# Patient Record
Sex: FEMALE | Race: WHITE | NOT HISPANIC OR LATINO | Employment: FULL TIME | URBAN - METROPOLITAN AREA
[De-identification: names, ages, dates, MRNs, and addresses within clinical notes are randomized per-mention and may not be internally consistent; named-entity substitution may affect disease eponyms.]

---

## 2017-03-06 RX ORDER — BISOPROLOL FUMARATE 10 MG/1
TABLET, FILM COATED ORAL
Qty: 90 TABLET | Refills: 3 | Status: SHIPPED | OUTPATIENT
Start: 2017-03-06 | End: 2017-03-14 | Stop reason: SDUPTHER

## 2017-03-09 ENCOUNTER — LAB (OUTPATIENT)
Dept: LAB | Facility: HOSPITAL | Age: 32
End: 2017-03-09

## 2017-03-09 DIAGNOSIS — E83.110 HEREDITARY HEMOCHROMATOSIS (HCC): ICD-10-CM

## 2017-03-09 LAB
ALBUMIN SERPL-MCNC: 4.2 G/DL (ref 3.2–4.8)
ALBUMIN/GLOB SERPL: 1.5 G/DL (ref 1.5–2.5)
ALP SERPL-CCNC: 64 U/L (ref 25–100)
ALT SERPL W P-5'-P-CCNC: 40 U/L (ref 7–40)
ANION GAP SERPL CALCULATED.3IONS-SCNC: 8 MMOL/L (ref 3–11)
AST SERPL-CCNC: 29 U/L (ref 0–33)
BASOPHILS # BLD AUTO: 0.03 10*3/MM3 (ref 0–0.2)
BASOPHILS NFR BLD AUTO: 0.4 % (ref 0–1)
BILIRUB SERPL-MCNC: 0.3 MG/DL (ref 0.3–1.2)
BUN BLD-MCNC: 14 MG/DL (ref 9–23)
BUN/CREAT SERPL: 20 (ref 7–25)
CALCIUM SPEC-SCNC: 9.4 MG/DL (ref 8.7–10.4)
CHLORIDE SERPL-SCNC: 105 MMOL/L (ref 99–109)
CO2 SERPL-SCNC: 23 MMOL/L (ref 20–31)
CREAT BLD-MCNC: 0.7 MG/DL (ref 0.6–1.3)
DEPRECATED RDW RBC AUTO: 42.3 FL (ref 37–54)
EOSINOPHIL # BLD AUTO: 0.23 10*3/MM3 (ref 0.1–0.3)
EOSINOPHIL NFR BLD AUTO: 3.2 % (ref 0–3)
ERYTHROCYTE [DISTWIDTH] IN BLOOD BY AUTOMATED COUNT: 12.4 % (ref 11.3–14.5)
FERRITIN SERPL-MCNC: 102 NG/ML (ref 10–291)
GFR SERPL CREATININE-BSD FRML MDRD: 97 ML/MIN/1.73
GLOBULIN UR ELPH-MCNC: 2.8 GM/DL
GLUCOSE BLD-MCNC: 94 MG/DL (ref 70–100)
HCT VFR BLD AUTO: 43.3 % (ref 34.5–44)
HGB BLD-MCNC: 14.7 G/DL (ref 11.5–15.5)
IMM GRANULOCYTES # BLD: 0.02 10*3/MM3 (ref 0–0.03)
IMM GRANULOCYTES NFR BLD: 0.3 % (ref 0–0.6)
IRON 24H UR-MRATE: 112 MCG/DL (ref 50–175)
IRON SATN MFR SERPL: 34 % (ref 15–50)
LYMPHOCYTES # BLD AUTO: 3.59 10*3/MM3 (ref 0.6–4.8)
LYMPHOCYTES NFR BLD AUTO: 50.6 % (ref 24–44)
MCH RBC QN AUTO: 31.2 PG (ref 27–31)
MCHC RBC AUTO-ENTMCNC: 33.9 G/DL (ref 32–36)
MCV RBC AUTO: 91.9 FL (ref 80–99)
MONOCYTES # BLD AUTO: 0.5 10*3/MM3 (ref 0–1)
MONOCYTES NFR BLD AUTO: 7.1 % (ref 0–12)
NEUTROPHILS # BLD AUTO: 2.72 10*3/MM3 (ref 1.5–8.3)
NEUTROPHILS NFR BLD AUTO: 38.4 % (ref 41–71)
PLATELET # BLD AUTO: 201 10*3/MM3 (ref 150–450)
PMV BLD AUTO: 10.4 FL (ref 6–12)
POTASSIUM BLD-SCNC: 4 MMOL/L (ref 3.5–5.5)
PROT SERPL-MCNC: 7 G/DL (ref 5.7–8.2)
RBC # BLD AUTO: 4.71 10*6/MM3 (ref 3.89–5.14)
SODIUM BLD-SCNC: 136 MMOL/L (ref 132–146)
TIBC SERPL-MCNC: 328 MCG/DL (ref 250–450)
WBC NRBC COR # BLD: 7.09 10*3/MM3 (ref 3.5–10.8)

## 2017-03-09 PROCEDURE — 83550 IRON BINDING TEST: CPT | Performed by: INTERNAL MEDICINE

## 2017-03-09 PROCEDURE — 82728 ASSAY OF FERRITIN: CPT | Performed by: INTERNAL MEDICINE

## 2017-03-09 PROCEDURE — 85025 COMPLETE CBC W/AUTO DIFF WBC: CPT | Performed by: INTERNAL MEDICINE

## 2017-03-09 PROCEDURE — 36415 COLL VENOUS BLD VENIPUNCTURE: CPT

## 2017-03-09 PROCEDURE — 83540 ASSAY OF IRON: CPT | Performed by: INTERNAL MEDICINE

## 2017-03-09 PROCEDURE — 80053 COMPREHEN METABOLIC PANEL: CPT | Performed by: INTERNAL MEDICINE

## 2017-03-14 ENCOUNTER — OFFICE VISIT (OUTPATIENT)
Dept: CARDIOLOGY | Facility: CLINIC | Age: 32
End: 2017-03-14

## 2017-03-14 VITALS
HEART RATE: 89 BPM | WEIGHT: 221.2 LBS | DIASTOLIC BLOOD PRESSURE: 80 MMHG | SYSTOLIC BLOOD PRESSURE: 102 MMHG | BODY MASS INDEX: 40.7 KG/M2 | HEIGHT: 62 IN

## 2017-03-14 DIAGNOSIS — R00.0 INAPPROPRIATE SINUS TACHYCARDIA: Primary | ICD-10-CM

## 2017-03-14 DIAGNOSIS — R55 VASOVAGAL SYNCOPE: ICD-10-CM

## 2017-03-14 PROCEDURE — 99213 OFFICE O/P EST LOW 20 MIN: CPT | Performed by: INTERNAL MEDICINE

## 2017-03-14 RX ORDER — BUSPIRONE HYDROCHLORIDE 15 MG/1
15 TABLET ORAL 2 TIMES DAILY
COMMUNITY
End: 2018-06-21

## 2017-03-14 RX ORDER — DESVENLAFAXINE 100 MG/1
100 TABLET, EXTENDED RELEASE ORAL DAILY
COMMUNITY
End: 2019-05-21 | Stop reason: SDUPTHER

## 2017-03-14 RX ORDER — BISOPROLOL FUMARATE 10 MG/1
10 TABLET, FILM COATED ORAL DAILY
Qty: 90 TABLET | Refills: 3 | Status: SHIPPED | OUTPATIENT
Start: 2017-03-14 | End: 2018-11-30

## 2017-03-14 NOTE — PROGRESS NOTES
Cristina Martínez  1985  [phone]      03/14/2017    [facility] CARDIOLOGY     Rahat Mercedes MD  [address] [place] [address]    Chief Complaint   Patient presents with   • Palpitations       Problem List:   PROBLEM LIST:   1. Vasovagal syncope:   a. Echocardiogram, 04/12/2007, showing trace MR, TR, EF 60%.  b. Echocardiogram, March 2008: Normal LV size and function.   c. Echocardiogram, November 2013: EF 60% to 65%, trace MR.   2. Inappropriate sinus tachycardia:   a. Electrophysiology study with radiofrequency ablation, June 2011 at [facility] in [place], [place], no data.   b. Echocardiogram February 2016: EF 60% to 65%, impaired relaxation noted. Trace MR, trace TR.  3. Hypertension.   4. Thirteen weeks pregnant on 08/08/2012.  5. Sinus tachycardia.   6. Migraine headaches.   7. Probable fibromyalgia.   8. Possible hemochromatosis, status post liver biopsy.   9. History of positive Monospot.   10. Depression.   11. Status post hysterectomy, August 2013.    Allergies  No Known Allergies    Current Medications    Current Outpatient Prescriptions:   •  bisoprolol (ZEBeta) 10 MG tablet, TAKE 1 TABLET ONCE DAILY, Disp: 90 tablet, Rfl: 3  •  busPIRone (BUSPAR) 15 MG tablet, Take 15 mg by mouth 2 (Two) Times a Day., Disp: , Rfl:   •  desvenlafaxine (PRISTIQ) 100 MG 24 hr tablet, Take 100 mg by mouth Daily., Disp: , Rfl:     History of Present Illness   HPI    Pt presents for follow up of sinus tachycardia and vasovagal syncope. Since we last saw the pt she had one true syncopal episode in the setting of intense emotional stress.  She says she was out for a few minutes and EMS was called. It was reported that they had difficulty having finding a pulse and her BP was very low.  She has had no further episodes.  In the evenings her BP is running 130/100.  In the morning her BP is lower 100/80. pt denies any AF episodes, SOB, CP, LH, and  "dizziness. Denies any hospitalizations, ER visits, bleeding, or TIA/CVA symptoms. Overall feels well.    ROS:  General:  + ongoing fatigue, No weight gain or loss  Cardiovascular:  Denies CP, PND, edema or  Occasional palpitations.  Pulmonary:  Denies HEART, cough, or wheezing      Vitals:    03/14/17 1337   BP: 102/80   BP Location: Right arm   Patient Position: Sitting   Pulse: 89   Weight: 221 lb 3.2 oz (100 kg)   Height: 62\" (157.5 cm)     PE:  General: NAD  Neck: no JVD, no carotid bruits, no TM  Heart RRR, NL S1, S2, S4 present, no rubs, murmurs  Lungs: CTA, no wheezes, rhonchi, or rales  Abd: soft, non-tender, NL BS  Ext: No musculoskeletal deformities, no edema, cyanosis, or clubbing  Psych: normal mood and affect    Diagnostic Data:  Procedures    1. Inappropriate sinus tachycardia    2. Vasovagal syncope      Plan:  1) Sinus tachycardia -  Stable on her current dose of meds  2) Vasovagal syncope- 1 episode in the setting of intense emotional stress   3) HTN- BP well controlled.     F/up in 12 months    Scribed for Dashawn Gentile MD by VERITO Ordoñez. 3/14/2017  2:22 PM     I, Dashawn Gentile MD, personally performed the services face to face as described in this documentation and as scribed by the above named individual in my presence, and it is both accurate and complete.  3/14/2017  2:22 PM        "

## 2017-03-16 ENCOUNTER — OFFICE VISIT (OUTPATIENT)
Dept: ONCOLOGY | Facility: CLINIC | Age: 32
End: 2017-03-16

## 2017-03-16 VITALS
RESPIRATION RATE: 16 BRPM | WEIGHT: 220 LBS | DIASTOLIC BLOOD PRESSURE: 77 MMHG | BODY MASS INDEX: 40.24 KG/M2 | SYSTOLIC BLOOD PRESSURE: 105 MMHG | HEART RATE: 75 BPM | TEMPERATURE: 97.3 F

## 2017-03-16 DIAGNOSIS — E83.110 HEREDITARY HEMOCHROMATOSIS (HCC): Primary | ICD-10-CM

## 2017-03-16 PROCEDURE — 99213 OFFICE O/P EST LOW 20 MIN: CPT | Performed by: NURSE PRACTITIONER

## 2017-03-16 NOTE — PROGRESS NOTES
CHIEF COMPLAINT: Follow-up for hemochromatosis    Problem List:  Oncology/Hematology History    1. 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  No other significant upper abdominal pathology. Liver otherwise appears unremarkable.   2. History of hysterectomy.   3. History of neurocardiogenic syncope status post cardiac ablation by Dr. Gentile.   4.  Passage of kidney stone with retrieval July 2016        Hemochromatosis    5/1/2007 Initial Diagnosis    Hemochromatosis         HISTORY OF PRESENT ILLNESS:  The patient is a 31 y.o. female, here for follow up on management of hereditary hemachromatosis.  The patient has been doing well since we saw her last with no change in her health.  She has not donated blood or had any phlebotomies.  Continues to struggle with weight issues, has not lost any weight.  Overall she states that she actually feels well.  She does use essential oils and feels that this has made an improvement in her well-being.      Past Medical History   Diagnosis Date   • Cardiac abnormality    • Depression 12/16/2016   • Fibromyalgia 12/16/2016     probable   • Hemochromatosis    • Hypertension 12/16/2016   • Inappropriate sinus tachycardia 12/16/2016     Electrophysiology study with radiofrequency ablation, June 2011 at [facility] in [place], [place], no data.   Echocardiogram February 2016: EF 60% to 65%, impaired relaxation noted. Trace MR, trace TR.    • Kidney stone    • Migraines    • Monospot test positive      History of positive Monospot.     • Neurocardiogenic syncope      with tachycardia. Pt reports she had cardiac ablation in 2011.     Past Surgical History   Procedure Laterality Date   • Hysterectomy     • Cystoscopy ureteroscopy Left 7/10/2016     Procedure: CYSTOSCOPY, LEFT URETEROSCOPY, STONE EXTRACTION,  LEFT URETERAL STENT INSERTION UNDER FLUROSCOPY;  Surgeon: Bernardo Simental MD;  Location: [facility];  Service:        No Known Allergies    Family History and Social History reviewed and changed as necessary      REVIEW OF SYSTEM:   Review of Systems   Constitutional: Negative for appetite change, chills, diaphoresis, fatigue, fever and unexpected weight change.   HENT:   Negative for mouth sores, sore throat and trouble swallowing.    Eyes: Negative for icterus.   Respiratory: Negative for cough, hemoptysis and shortness of breath.    Cardiovascular: Negative for chest pain, leg swelling and palpitations.   Gastrointestinal: Negative for abdominal distention, abdominal pain, blood in stool, constipation, diarrhea, nausea and vomiting.   Endocrine: Negative for hot flashes.   Genitourinary: Negative for bladder incontinence, difficulty urinating, dysuria, frequency and hematuria.    Musculoskeletal: Negative for gait problem, neck pain and neck stiffness.   Skin: Negative for rash.   Neurological: Negative for dizziness, gait problem, headaches, light-headedness and numbness.   Hematological: Negative for adenopathy. Does not bruise/bleed easily.   Psychiatric/Behavioral: Negative for depression. The patient is not nervous/anxious.    All other systems reviewed and are negative.       PHYSICAL EXAM    Vitals:    03/16/17 0916   BP: 105/77   Pulse: 75   Resp: 16   Temp: 97.3 °F (36.3 °C)   TempSrc: Temporal Artery    Weight: 220 lb (99.8 kg)     Constitutional: Appears well-developed and well-nourished. No distress.   ECOG: (0) Fully active, able to carry on all predisease performance without restriction  HENT:   Head: Normocephalic.   Mouth/Throat: Oropharynx is clear and moist.   Eyes: Conjunctivae are normal. Pupils are equal, round, and reactive. No scleral icterus.   Neck: Neck supple. No JVD present. No thyromegaly present.   Cardiovascular: Normal rate, regular rhythm and normal heart sounds.     Pulmonary/Chest: Breath sounds normal. No respiratory distress.   Abdominal: Soft. Exhibits no distension and no mass. There is no hepatosplenomegaly.   Musculoskeletal:Exhibits no edema, tenderness or deformity.   Neurological: Alert and oriented to person, place, and time. Exhibits normal muscle tone.   Skin: No ecchymosis, no petechiae and no rash noted. Not diaphoretic. No cyanosis. Nails show no clubbing.   Psychiatric: Normal mood and affect.   Vitals reviewed.    Recent Results (from the past 336 hour(s))   Ferritin    Collection Time: 03/09/17  9:59 AM   Result Value Ref Range    Ferritin 102.00 10.00 - 291.00 ng/mL   Comprehensive metabolic panel    Collection Time: 03/09/17  9:59 AM   Result Value Ref Range    Glucose 94 70 - 100 mg/dL    BUN 14 9 - 23 mg/dL    Creatinine 0.70 0.60 - 1.30 mg/dL    Sodium 136 132 - 146 mmol/L    Potassium 4.0 3.5 - 5.5 mmol/L    Chloride 105 99 - 109 mmol/L    CO2 23.0 20.0 - 31.0 mmol/L    Calcium 9.4 8.7 - 10.4 mg/dL    Total Protein 7.0 5.7 - 8.2 g/dL    Albumin 4.20 3.20 - 4.80 g/dL    ALT (SGPT) 40 7 - 40 U/L    AST (SGOT) 29 0 - 33 U/L    Alkaline Phosphatase 64 25 - 100 U/L    Total Bilirubin 0.3 0.3 - 1.2 mg/dL    eGFR Non African Amer 97 >60 mL/min/1.73    Globulin 2.8 gm/dL    A/G Ratio 1.5 1.5 - 2.5 g/dL    BUN/Creatinine Ratio 20.0 7.0 - 25.0    Anion Gap 8.0 3.0 - 11.0 mmol/L   Iron profile    Collection Time: 03/09/17  9:59 AM   Result Value Ref Range    Iron 112 50 - 175 mcg/dL    TIBC 328 250 - 450 mcg/dL    Iron Saturation 34 15 - 50 %   CBC Auto Differential    Collection Time: 03/09/17  9:59 AM   Result Value Ref Range    WBC 7.09 3.50 - 10.80 10*3/mm3    RBC 4.71 3.89 - 5.14 10*6/mm3    Hemoglobin 14.7 11.5 - 15.5 g/dL    Hematocrit 43.3 34.5 - 44.0 %    MCV 91.9 80.0 - 99.0 fL    MCH 31.2 (H) 27.0 - 31.0 pg    MCHC 33.9 32.0 - 36.0 g/dL    RDW 12.4 11.3 - 14.5 %    RDW-SD 42.3 37.0 - 54.0 fl    MPV 10.4 6.0 - 12.0 fL    Platelets 201 150 - 450  10*3/mm3    Neutrophil % 38.4 (L) 41.0 - 71.0 %    Lymphocyte % 50.6 (H) 24.0 - 44.0 %    Monocyte % 7.1 0.0 - 12.0 %    Eosinophil % 3.2 (H) 0.0 - 3.0 %    Basophil % 0.4 0.0 - 1.0 %    Immature Grans % 0.3 0.0 - 0.6 %    Neutrophils, Absolute 2.72 1.50 - 8.30 10*3/mm3    Lymphocytes, Absolute 3.59 0.60 - 4.80 10*3/mm3    Monocytes, Absolute 0.50 0.00 - 1.00 10*3/mm3    Eosinophils, Absolute 0.23 0.10 - 0.30 10*3/mm3    Basophils, Absolute 0.03 0.00 - 0.20 10*3/mm3    Immature Grans, Absolute 0.02 0.00 - 0.03 10*3/mm3         Assessment/Plan     1. Hemochromatosis: The patient has not done any phlebotomies or blood donation since we saw her last 6 months ago.  Her ferritin is just over 100 currently at 102 with an iron of 112 and iron saturation of 34.  I have recommended that she do a blood donation sometime within the upcoming weeks and then again about a month or 2 after that, this should keep her ferritin below 100.  We will check ferritin again in 3 months and again just prior to return in 6 months along with a CBC and a CMP.  Her current CMP was within normal limits, transaminases were within normal limits also.  We will see her back in 6 months for follow-up with repeat MRI of the abdomen for liver iron quantitation, and I have ordered that.  We again discussed today the importance of weight loss as maintaining a healthy weight would be beneficial for her overall health and also liver health.       Errors in dictation may reflect use of voice recognition software and not all errors in transcription may have been detected prior to signing.    Joana Johansen, APRN    03/16/2017

## 2017-09-11 ENCOUNTER — RESULTS ENCOUNTER (OUTPATIENT)
Dept: ONCOLOGY | Facility: CLINIC | Age: 32
End: 2017-09-11

## 2017-09-11 DIAGNOSIS — E83.110 HEREDITARY HEMOCHROMATOSIS (HCC): ICD-10-CM

## 2017-09-25 ENCOUNTER — HOSPITAL ENCOUNTER (OUTPATIENT)
Dept: MRI IMAGING | Facility: HOSPITAL | Age: 32
Discharge: HOME | End: 2017-09-25

## 2017-09-27 ENCOUNTER — HOSPITAL ENCOUNTER (OUTPATIENT)
Dept: MRI IMAGING | Facility: HOSPITAL | Age: 32
Discharge: HOME | End: 2017-09-27
Admitting: NURSE PRACTITIONER

## 2017-09-27 DIAGNOSIS — E83.110 HEREDITARY HEMOCHROMATOSIS (HCC): ICD-10-CM

## 2017-09-27 PROCEDURE — 74181 MRI ABDOMEN W/O CONTRAST: CPT

## 2017-09-28 ENCOUNTER — OFFICE VISIT (OUTPATIENT)
Dept: ONCOLOGY | Facility: CLINIC | Age: 32
End: 2017-09-28

## 2017-09-28 ENCOUNTER — LAB (OUTPATIENT)
Dept: LAB | Facility: HOSPITAL | Age: 32
End: 2017-09-28

## 2017-09-28 ENCOUNTER — INFUSION (OUTPATIENT)
Dept: ONCOLOGY | Facility: HOSPITAL | Age: 32
End: 2017-09-28

## 2017-09-28 VITALS
DIASTOLIC BLOOD PRESSURE: 83 MMHG | WEIGHT: 221 LBS | RESPIRATION RATE: 14 BRPM | BODY MASS INDEX: 40.42 KG/M2 | HEART RATE: 75 BPM | TEMPERATURE: 98 F | SYSTOLIC BLOOD PRESSURE: 119 MMHG

## 2017-09-28 VITALS — HEART RATE: 88 BPM | SYSTOLIC BLOOD PRESSURE: 101 MMHG | DIASTOLIC BLOOD PRESSURE: 72 MMHG

## 2017-09-28 DIAGNOSIS — E83.110 HEREDITARY HEMOCHROMATOSIS (HCC): Primary | ICD-10-CM

## 2017-09-28 DIAGNOSIS — E83.110 HEREDITARY HEMOCHROMATOSIS (HCC): ICD-10-CM

## 2017-09-28 LAB
BASOPHILS # BLD AUTO: 0.07 10*3/MM3 (ref 0–0.2)
BASOPHILS NFR BLD AUTO: 1 % (ref 0–2.5)
DEPRECATED RDW RBC AUTO: 40.5 FL (ref 37–54)
EOSINOPHIL # BLD AUTO: 0.21 10*3/MM3 (ref 0–0.7)
EOSINOPHIL NFR BLD AUTO: 3 % (ref 0–7)
ERYTHROCYTE [DISTWIDTH] IN BLOOD BY AUTOMATED COUNT: 12.2 % (ref 11.5–14.5)
FERRITIN SERPL-MCNC: 77.7 NG/ML (ref 6.24–137)
HCT VFR BLD AUTO: 44.3 % (ref 37–47)
HGB BLD-MCNC: 15.1 G/DL (ref 12–16)
IMM GRANULOCYTES # BLD: 0.02 10*3/MM3 (ref 0–0.06)
IMM GRANULOCYTES NFR BLD: 0.3 % (ref 0–0.6)
IRON 24H UR-MRATE: 106 MCG/DL (ref 37–181)
IRON SATN MFR SERPL: 33 % (ref 11–46)
LYMPHOCYTES # BLD AUTO: 3.74 10*3/MM3 (ref 0.6–3.4)
LYMPHOCYTES NFR BLD AUTO: 54 % (ref 10–50)
MCH RBC QN AUTO: 31.1 PG (ref 27–31)
MCHC RBC AUTO-ENTMCNC: 34.1 G/DL (ref 30–37)
MCV RBC AUTO: 91.2 FL (ref 81–99)
MONOCYTES # BLD AUTO: 0.55 10*3/MM3 (ref 0–0.9)
MONOCYTES NFR BLD AUTO: 7.9 % (ref 0–12)
NEUTROPHILS # BLD AUTO: 2.34 10*3/MM3 (ref 2–6.9)
NEUTROPHILS NFR BLD AUTO: 33.8 % (ref 37–80)
NRBC BLD MANUAL-RTO: 0 /100 WBC (ref 0–0)
PLATELET # BLD AUTO: 193 10*3/MM3 (ref 130–400)
PMV BLD AUTO: 10 FL (ref 6–12)
RBC # BLD AUTO: 4.86 10*6/MM3 (ref 4.2–5.4)
TIBC SERPL-MCNC: 318 MCG/DL (ref 261–497)
WBC NRBC COR # BLD: 6.93 10*3/MM3 (ref 4.8–10.8)

## 2017-09-28 PROCEDURE — 83550 IRON BINDING TEST: CPT | Performed by: INTERNAL MEDICINE

## 2017-09-28 PROCEDURE — 36415 COLL VENOUS BLD VENIPUNCTURE: CPT

## 2017-09-28 PROCEDURE — 82728 ASSAY OF FERRITIN: CPT | Performed by: INTERNAL MEDICINE

## 2017-09-28 PROCEDURE — 99214 OFFICE O/P EST MOD 30 MIN: CPT | Performed by: INTERNAL MEDICINE

## 2017-09-28 PROCEDURE — 99195 PHLEBOTOMY: CPT

## 2017-09-28 PROCEDURE — 83540 ASSAY OF IRON: CPT | Performed by: INTERNAL MEDICINE

## 2017-09-28 PROCEDURE — 85025 COMPLETE CBC W/AUTO DIFF WBC: CPT | Performed by: INTERNAL MEDICINE

## 2017-09-28 RX ORDER — SODIUM CHLORIDE 9 MG/ML
250 INJECTION, SOLUTION INTRAVENOUS ONCE
Status: CANCELLED | OUTPATIENT
Start: 2017-09-28

## 2017-09-28 RX ORDER — ONDANSETRON 4 MG/1
8 TABLET, ORALLY DISINTEGRATING ORAL ONCE
Status: COMPLETED | OUTPATIENT
Start: 2017-09-28 | End: 2017-09-28

## 2017-09-28 RX ADMIN — ONDANSETRON 8 MG: 4 TABLET, ORALLY DISINTEGRATING ORAL at 13:04

## 2017-09-28 NOTE — PROGRESS NOTES
"CHIEF COMPLAINT: Hereditary hemochromatosis    Problem List:  Oncology/Hematology History    1. 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  No other significant upper abdominal pathology. Liver otherwise appears unremarkable.   2. History of hysterectomy.   3. History of neurocardiogenic syncope status post cardiac ablation by Dr. Gentile.   4.  Passage of kidney stone with retrieval July 2016        Hemochromatosis    5/1/2007 Initial Diagnosis     Hemochromatosis         9/27/2017 Imaging     MRI liver iron quantitation  Impression:     Moderate-to-severe iron deposition in the liver measuring  280 umol per gram with 300 umol per gram defining \"major iron overload\".  In May 2016 the measurement was 270 umol per gram.                    HISTORY OF PRESENT ILLNESS:  The patient is a 31 y.o. female, here for follow up on management of Hereditary hemochromatosis.  She is very fatigued.  Has a malar rash and general fatigue.  Has neurocardiogenic syncope status post cardiac ablation, kidney stones, hysterectomy, and her liver iron quantitation as above is worsening.  She has generalized aches in the muscles.  Has not seen rheumatology.    Past Medical History:   Diagnosis Date   • Cardiac abnormality    • Depression 12/16/2016   • Fibromyalgia 12/16/2016    probable   • Hemochromatosis    • Hypertension 12/16/2016   • Inappropriate sinus tachycardia 12/16/2016    Electrophysiology study with radiofrequency ablation, June 2011 at [facility] in [place], [place], no data.   Echocardiogram February 2016: EF 60% to 65%, impaired relaxation noted. Trace MR, trace TR.    • Kidney stone    • Migraines    • Monospot test positive     History of positive Monospot.     • Neurocardiogenic syncope     with tachycardia. Pt reports she had cardiac ablation " in 2011.     Past Surgical History:   Procedure Laterality Date   • CYSTOSCOPY URETEROSCOPY Left 7/10/2016    Procedure: CYSTOSCOPY, LEFT URETEROSCOPY, STONE EXTRACTION, LEFT URETERAL STENT INSERTION UNDER FLUROSCOPY;  Surgeon: Bernardo Simental MD;  Location: [facility];  Service:    • HYSTERECTOMY         No Known Allergies    Family History and Social History reviewed and changed as necessary      REVIEW OF SYSTEM:   Review of Systems   Constitutional: Negative for appetite change, chills, diaphoresis, fatigue, fever and unexpected weight change.   HENT:   Negative for mouth sores, sore throat and trouble swallowing.    Eyes: Negative for icterus.   Respiratory: Negative for cough, hemoptysis and shortness of breath.    Cardiovascular: Negative for chest pain, leg swelling and palpitations.   Gastrointestinal: Negative for abdominal distention, abdominal pain, blood in stool, constipation, diarrhea, nausea and vomiting.   Endocrine: Negative for hot flashes.   Genitourinary: Negative for bladder incontinence, difficulty urinating, dysuria, frequency and hematuria.    Musculoskeletal: Negative for gait problem, neck pain and neck stiffness.   Skin: Negative for rash.   Neurological: Negative for dizziness, gait problem, headaches, light-headedness and numbness.   Hematological: Negative for adenopathy. Does not bruise/bleed easily.   Psychiatric/Behavioral: Negative for depression. The patient is not nervous/anxious.    All other systems reviewed and are negative.       PHYSICAL EXAM    Vitals:    09/28/17 0908   BP: 119/83   Pulse: 75   Resp: 14   Temp: 98 °F (36.7 °C)   TempSrc: Temporal Artery    Weight: 221 lb (100 kg)     Constitutional: Appears well-developed and well-nourished. No distress.   ECOG: (1) Restricted in physically strenuous activity, ambulatory and able to do work of light nature  HENT:   Head: Normocephalic.  Malar rash  Mouth/Throat: Oropharynx is clear and moist.   Eyes: Conjunctivae are  normal. Pupils are equal, round, and reactive to light. No scleral icterus.   Neck: Neck supple. No JVD present. No thyromegaly present.   Cardiovascular: Normal rate, regular rhythm and normal heart sounds.    Pulmonary/Chest: Breath sounds normal. No respiratory distress.   Abdominal: Soft. Exhibits no distension and no mass. There is no hepatosplenomegaly. There is no tenderness. There is no rebound and no guarding.   Musculoskeletal:Exhibits no edema, tenderness or deformity.   Neurological: Alert and oriented to person, place, and time. Exhibits normal muscle tone.   Skin: No ecchymosis, no petechiae and no rash noted. Not diaphoretic. No cyanosis. Nails show no clubbing.   Psychiatric: Normal mood and affect.   Vitals reviewed.      No visits with results within 6 Week(s) from this visit.  Latest known visit with results is:    Lab on 03/09/2017   Component Date Value Ref Range Status   • Ferritin 03/09/2017 102.00  10.00 - 291.00 ng/mL Final   • Glucose 03/09/2017 94  70 - 100 mg/dL Final   • BUN 03/09/2017 14  9 - 23 mg/dL Final   • Creatinine 03/09/2017 0.70  0.60 - 1.30 mg/dL Final   • Sodium 03/09/2017 136  132 - 146 mmol/L Final   • Potassium 03/09/2017 4.0  3.5 - 5.5 mmol/L Final   • Chloride 03/09/2017 105  99 - 109 mmol/L Final   • CO2 03/09/2017 23.0  20.0 - 31.0 mmol/L Final   • Calcium 03/09/2017 9.4  8.7 - 10.4 mg/dL Final   • Total Protein 03/09/2017 7.0  5.7 - 8.2 g/dL Final   • Albumin 03/09/2017 4.20  3.20 - 4.80 g/dL Final   • ALT (SGPT) 03/09/2017 40  7 - 40 U/L Final   • AST (SGOT) 03/09/2017 29  0 - 33 U/L Final   • Alkaline Phosphatase 03/09/2017 64  25 - 100 U/L Final   • Total Bilirubin 03/09/2017 0.3  0.3 - 1.2 mg/dL Final   • eGFR Non African Amer 03/09/2017 97  >60 mL/min/1.73 Final   • Globulin 03/09/2017 2.8  gm/dL Final   • A/G Ratio 03/09/2017 1.5  1.5 - 2.5 g/dL Final   • BUN/Creatinine Ratio 03/09/2017 20.0  7.0 - 25.0 Final   • Anion Gap 03/09/2017 8.0  3.0 - 11.0 mmol/L Final    • Iron 03/09/2017 112  50 - 175 mcg/dL Final   • TIBC 03/09/2017 328  250 - 450 mcg/dL Final   • Iron Saturation 03/09/2017 34  15 - 50 % Final   • WBC 03/09/2017 7.09  3.50 - 10.80 10*3/mm3 Final   • RBC 03/09/2017 4.71  3.89 - 5.14 10*6/mm3 Final   • Hemoglobin 03/09/2017 14.7  11.5 - 15.5 g/dL Final   • Hematocrit 03/09/2017 43.3  34.5 - 44.0 % Final   • MCV 03/09/2017 91.9  80.0 - 99.0 fL Final   • MCH 03/09/2017 31.2* 27.0 - 31.0 pg Final   • MCHC 03/09/2017 33.9  32.0 - 36.0 g/dL Final   • RDW 03/09/2017 12.4  11.3 - 14.5 % Final   • RDW-SD 03/09/2017 42.3  37.0 - 54.0 fl Final   • MPV 03/09/2017 10.4  6.0 - 12.0 fL Final   • Platelets 03/09/2017 201  150 - 450 10*3/mm3 Final   • Neutrophil % 03/09/2017 38.4* 41.0 - 71.0 % Final   • Lymphocyte % 03/09/2017 50.6* 24.0 - 44.0 % Final   • Monocyte % 03/09/2017 7.1  0.0 - 12.0 % Final   • Eosinophil % 03/09/2017 3.2* 0.0 - 3.0 % Final   • Basophil % 03/09/2017 0.4  0.0 - 1.0 % Final   • Immature Grans % 03/09/2017 0.3  0.0 - 0.6 % Final   • Neutrophils, Absolute 03/09/2017 2.72  1.50 - 8.30 10*3/mm3 Final   • Lymphocytes, Absolute 03/09/2017 3.59  0.60 - 4.80 10*3/mm3 Final   • Monocytes, Absolute 03/09/2017 0.50  0.00 - 1.00 10*3/mm3 Final   • Eosinophils, Absolute 03/09/2017 0.23  0.10 - 0.30 10*3/mm3 Final   • Basophils, Absolute 03/09/2017 0.03  0.00 - 0.20 10*3/mm3 Final   • Immature Grans, Absolute 03/09/2017 0.02  0.00 - 0.03 10*3/mm3 Final       Assessment/Plan     1.  Hemochromatosis  2. Generalized aches  3. Malar rash  4. Fatigue    Discussion: she is a red hair light complected greenish to blue eyes female at risk for autoimmune disease with a malar rash and generalized aches.  We'll get her to rheumatology for possible lupus as a cause for her fatigue and aches.  In the meantime, given her increase in the liver iron on MRI liver iron quantitation, we will start weekly phlebotomies for the next 6 weeks and we'll shoot for a ferritin less than 50  which is a little more aggressive than I would normally be.  One needs to keep in mind that a ferritin can be falsely elevated as an acute phase reactant from her possible autoimmune disease or other inflammatory conditions but I would give her the benefit of the doubt on that and push for a ferritin level less than 50 as long as her hemoglobin is staying over 11.      Yonas Pavon MD    09/28/2017

## 2017-09-29 ENCOUNTER — TELEPHONE (OUTPATIENT)
Dept: ONCOLOGY | Facility: CLINIC | Age: 32
End: 2017-09-29

## 2017-09-29 NOTE — TELEPHONE ENCOUNTER
Spoke with patient. I asked her if she could swing by the [facility] so I could assess her arm.    Patient stopped by with her mom. There is a small red dot from the needle puncture and skin around site is a tiny bit pink. No swelling, redness, heat noted. Also had VERITO Pelayo look at her arm. Reassured patient that this looks normal.     Instructed patient to use warm compresses and ibuprofen PRN for discomfort.

## 2017-09-29 NOTE — TELEPHONE ENCOUNTER
----- Message from Johnny Kearns sent at 9/29/2017  2:23 PM EDT -----  Regarding: Pavon, arm is swollen and pain  Contact: [phone]  Patient's right arm is swollen, hurts all the way up to shoulder, and leaking yellow fluid. Patient had therapeutic phlebotomy yesterday. Patient wants to know what to do.

## 2017-10-05 ENCOUNTER — INFUSION (OUTPATIENT)
Dept: ONCOLOGY | Facility: HOSPITAL | Age: 32
End: 2017-10-05

## 2017-10-05 DIAGNOSIS — E83.110 HEREDITARY HEMOCHROMATOSIS (HCC): Primary | ICD-10-CM

## 2017-10-05 LAB
BASOPHILS # BLD AUTO: 0.07 10*3/MM3 (ref 0–0.2)
BASOPHILS NFR BLD AUTO: 1 % (ref 0–2.5)
DEPRECATED RDW RBC AUTO: 40.5 FL (ref 37–54)
EOSINOPHIL # BLD AUTO: 0.21 10*3/MM3 (ref 0–0.7)
EOSINOPHIL NFR BLD AUTO: 3 % (ref 0–7)
ERYTHROCYTE [DISTWIDTH] IN BLOOD BY AUTOMATED COUNT: 12.3 % (ref 11.5–14.5)
FERRITIN SERPL-MCNC: 69.9 NG/ML (ref 6.24–137)
HCT VFR BLD AUTO: 39.5 % (ref 37–47)
HGB BLD-MCNC: 13.5 G/DL (ref 12–16)
IMM GRANULOCYTES # BLD: 0.02 10*3/MM3 (ref 0–0.06)
IMM GRANULOCYTES NFR BLD: 0.3 % (ref 0–0.6)
IRON 24H UR-MRATE: 80 MCG/DL (ref 37–181)
IRON SATN MFR SERPL: 25 % (ref 11–46)
LYMPHOCYTES # BLD AUTO: 3.3 10*3/MM3 (ref 0.6–3.4)
LYMPHOCYTES NFR BLD AUTO: 47.9 % (ref 10–50)
MCH RBC QN AUTO: 30.8 PG (ref 27–31)
MCHC RBC AUTO-ENTMCNC: 34.2 G/DL (ref 30–37)
MCV RBC AUTO: 90 FL (ref 81–99)
MONOCYTES # BLD AUTO: 0.37 10*3/MM3 (ref 0–0.9)
MONOCYTES NFR BLD AUTO: 5.4 % (ref 0–12)
NEUTROPHILS # BLD AUTO: 2.92 10*3/MM3 (ref 2–6.9)
NEUTROPHILS NFR BLD AUTO: 42.4 % (ref 37–80)
NRBC BLD MANUAL-RTO: 0 /100 WBC (ref 0–0)
PLATELET # BLD AUTO: 219 10*3/MM3 (ref 130–400)
PMV BLD AUTO: 10.1 FL (ref 6–12)
RBC # BLD AUTO: 4.39 10*6/MM3 (ref 4.2–5.4)
TIBC SERPL-MCNC: 324 MCG/DL (ref 261–497)
WBC NRBC COR # BLD: 6.89 10*3/MM3 (ref 4.8–10.8)

## 2017-10-05 PROCEDURE — 82728 ASSAY OF FERRITIN: CPT

## 2017-10-05 PROCEDURE — 83550 IRON BINDING TEST: CPT

## 2017-10-05 PROCEDURE — 85025 COMPLETE CBC W/AUTO DIFF WBC: CPT | Performed by: INTERNAL MEDICINE

## 2017-10-05 PROCEDURE — 36415 COLL VENOUS BLD VENIPUNCTURE: CPT

## 2017-10-05 PROCEDURE — 99195 PHLEBOTOMY: CPT

## 2017-10-05 PROCEDURE — 83540 ASSAY OF IRON: CPT

## 2017-10-05 RX ORDER — ONDANSETRON 4 MG/1
8 TABLET, FILM COATED ORAL ONCE
Status: DISCONTINUED | OUTPATIENT
Start: 2017-10-05 | End: 2017-10-05

## 2017-10-05 RX ORDER — SODIUM CHLORIDE 9 MG/ML
250 INJECTION, SOLUTION INTRAVENOUS ONCE
Status: COMPLETED | OUTPATIENT
Start: 2017-10-05 | End: 2017-10-05

## 2017-10-05 RX ORDER — ONDANSETRON 4 MG/1
8 TABLET, ORALLY DISINTEGRATING ORAL ONCE
Status: COMPLETED | OUTPATIENT
Start: 2017-10-05 | End: 2017-10-05

## 2017-10-05 RX ORDER — SODIUM CHLORIDE 9 MG/ML
250 INJECTION, SOLUTION INTRAVENOUS ONCE
Status: CANCELLED | OUTPATIENT
Start: 2017-10-05

## 2017-10-05 RX ADMIN — SODIUM CHLORIDE 250 ML: 9 INJECTION, SOLUTION INTRAVENOUS at 15:00

## 2017-10-05 RX ADMIN — ONDANSETRON 8 MG: 4 TABLET, ORALLY DISINTEGRATING ORAL at 15:30

## 2017-10-12 ENCOUNTER — INFUSION (OUTPATIENT)
Dept: ONCOLOGY | Facility: HOSPITAL | Age: 32
End: 2017-10-12

## 2017-10-12 VITALS
DIASTOLIC BLOOD PRESSURE: 68 MMHG | RESPIRATION RATE: 18 BRPM | TEMPERATURE: 97.8 F | HEART RATE: 80 BPM | SYSTOLIC BLOOD PRESSURE: 109 MMHG

## 2017-10-12 DIAGNOSIS — E83.110 HEREDITARY HEMOCHROMATOSIS (HCC): Primary | ICD-10-CM

## 2017-10-12 LAB
BASOPHILS # BLD AUTO: 0.06 10*3/MM3 (ref 0–0.2)
BASOPHILS NFR BLD AUTO: 0.7 % (ref 0–2.5)
DEPRECATED RDW RBC AUTO: 41.3 FL (ref 37–54)
EOSINOPHIL # BLD AUTO: 0.37 10*3/MM3 (ref 0–0.7)
EOSINOPHIL NFR BLD AUTO: 4.3 % (ref 0–7)
ERYTHROCYTE [DISTWIDTH] IN BLOOD BY AUTOMATED COUNT: 12.7 % (ref 11.5–14.5)
FERRITIN SERPL-MCNC: 36.5 NG/ML (ref 6.24–137)
HCT VFR BLD AUTO: 37.6 % (ref 37–47)
HGB BLD-MCNC: 12.7 G/DL (ref 12–16)
IMM GRANULOCYTES # BLD: 0.04 10*3/MM3 (ref 0–0.06)
IMM GRANULOCYTES NFR BLD: 0.5 % (ref 0–0.6)
IRON 24H UR-MRATE: 91 MCG/DL (ref 37–181)
IRON SATN MFR SERPL: 26 % (ref 11–46)
LYMPHOCYTES # BLD AUTO: 4.25 10*3/MM3 (ref 0.6–3.4)
LYMPHOCYTES NFR BLD AUTO: 48.9 % (ref 10–50)
MCH RBC QN AUTO: 30.5 PG (ref 27–31)
MCHC RBC AUTO-ENTMCNC: 33.8 G/DL (ref 30–37)
MCV RBC AUTO: 90.4 FL (ref 81–99)
MONOCYTES # BLD AUTO: 0.65 10*3/MM3 (ref 0–0.9)
MONOCYTES NFR BLD AUTO: 7.5 % (ref 0–12)
NEUTROPHILS # BLD AUTO: 3.33 10*3/MM3 (ref 2–6.9)
NEUTROPHILS NFR BLD AUTO: 38.1 % (ref 37–80)
NRBC BLD MANUAL-RTO: 0 /100 WBC (ref 0–0)
PLATELET # BLD AUTO: 194 10*3/MM3 (ref 130–400)
PMV BLD AUTO: 10.2 FL (ref 6–12)
RBC # BLD AUTO: 4.16 10*6/MM3 (ref 4.2–5.4)
TIBC SERPL-MCNC: 344 MCG/DL (ref 261–497)
WBC NRBC COR # BLD: 8.7 10*3/MM3 (ref 4.8–10.8)

## 2017-10-12 PROCEDURE — 83550 IRON BINDING TEST: CPT

## 2017-10-12 PROCEDURE — 99195 PHLEBOTOMY: CPT

## 2017-10-12 PROCEDURE — 82728 ASSAY OF FERRITIN: CPT

## 2017-10-12 PROCEDURE — 36415 COLL VENOUS BLD VENIPUNCTURE: CPT

## 2017-10-12 PROCEDURE — 85025 COMPLETE CBC W/AUTO DIFF WBC: CPT

## 2017-10-12 PROCEDURE — 83540 ASSAY OF IRON: CPT

## 2017-10-12 RX ORDER — SODIUM CHLORIDE 9 MG/ML
250 INJECTION, SOLUTION INTRAVENOUS ONCE
Status: CANCELLED | OUTPATIENT
Start: 2017-10-12

## 2017-10-19 DIAGNOSIS — N83.209 CYST OF OVARY, UNSPECIFIED LATERALITY: Primary | ICD-10-CM

## 2017-10-27 ENCOUNTER — INFUSION (OUTPATIENT)
Dept: ONCOLOGY | Facility: HOSPITAL | Age: 32
End: 2017-10-27

## 2017-10-27 VITALS
DIASTOLIC BLOOD PRESSURE: 71 MMHG | TEMPERATURE: 97.3 F | HEART RATE: 80 BPM | RESPIRATION RATE: 16 BRPM | SYSTOLIC BLOOD PRESSURE: 110 MMHG

## 2017-10-27 DIAGNOSIS — E83.110 HEREDITARY HEMOCHROMATOSIS (HCC): Primary | ICD-10-CM

## 2017-10-27 LAB
ANISOCYTOSIS BLD QL: ABNORMAL
BASOPHILS # BLD MANUAL: 0.07 10*3/MM3 (ref 0–0.2)
BASOPHILS NFR BLD AUTO: 1 % (ref 0–2.5)
CLUMPED PLATELETS: ABNORMAL
DEPRECATED RDW RBC AUTO: 41.4 FL (ref 37–54)
EOSINOPHIL # BLD MANUAL: 0.2 10*3/MM3 (ref 0–0.7)
EOSINOPHIL NFR BLD MANUAL: 3 % (ref 0–7)
ERYTHROCYTE [DISTWIDTH] IN BLOOD BY AUTOMATED COUNT: 12.6 % (ref 11.5–14.5)
HCT VFR BLD AUTO: 38.8 % (ref 37–47)
HGB BLD-MCNC: 13.1 G/DL (ref 12–16)
LARGE PLATELETS: ABNORMAL
LYMPHOCYTES # BLD MANUAL: 3.94 10*3/MM3 (ref 0.6–3.4)
LYMPHOCYTES NFR BLD MANUAL: 58 % (ref 10–50)
LYMPHOCYTES NFR BLD MANUAL: 8 % (ref 0–12)
MCH RBC QN AUTO: 30.8 PG (ref 27–31)
MCHC RBC AUTO-ENTMCNC: 33.8 G/DL (ref 30–37)
MCV RBC AUTO: 91.1 FL (ref 81–99)
MONOCYTES # BLD AUTO: 0.54 10*3/MM3 (ref 0–0.9)
NEUTROPHILS # BLD AUTO: 1.97 10*3/MM3 (ref 2–6.9)
NEUTROPHILS NFR BLD MANUAL: 27 % (ref 37–80)
NEUTS BAND NFR BLD MANUAL: 2 % (ref 0–6)
OVALOCYTES BLD QL SMEAR: ABNORMAL
PLATELET # BLD AUTO: 214 10*3/MM3 (ref 130–400)
PMV BLD AUTO: 10.4 FL (ref 6–12)
POIKILOCYTOSIS BLD QL SMEAR: ABNORMAL
RBC # BLD AUTO: 4.26 10*6/MM3 (ref 4.2–5.4)
SCAN SLIDE: NORMAL
SMALL PLATELETS BLD QL SMEAR: ADEQUATE
VARIANT LYMPHS NFR BLD MANUAL: 1 % (ref 0–0)
WBC MORPH BLD: NORMAL
WBC NRBC COR # BLD: 6.8 10*3/MM3 (ref 4.8–10.8)

## 2017-10-27 PROCEDURE — 85007 BL SMEAR W/DIFF WBC COUNT: CPT

## 2017-10-27 PROCEDURE — 85025 COMPLETE CBC W/AUTO DIFF WBC: CPT

## 2017-10-27 PROCEDURE — 99195 PHLEBOTOMY: CPT

## 2017-10-27 PROCEDURE — 36415 COLL VENOUS BLD VENIPUNCTURE: CPT

## 2017-10-27 RX ORDER — SODIUM CHLORIDE 9 MG/ML
250 INJECTION, SOLUTION INTRAVENOUS ONCE
Status: CANCELLED | OUTPATIENT
Start: 2017-10-27

## 2017-11-03 ENCOUNTER — INFUSION (OUTPATIENT)
Dept: ONCOLOGY | Facility: HOSPITAL | Age: 32
End: 2017-11-03

## 2017-11-03 VITALS
TEMPERATURE: 97.5 F | HEART RATE: 93 BPM | SYSTOLIC BLOOD PRESSURE: 115 MMHG | DIASTOLIC BLOOD PRESSURE: 65 MMHG | RESPIRATION RATE: 18 BRPM

## 2017-11-03 DIAGNOSIS — E83.110 HEREDITARY HEMOCHROMATOSIS (HCC): Primary | ICD-10-CM

## 2017-11-03 LAB
BASOPHILS # BLD AUTO: 0.08 10*3/MM3 (ref 0–0.2)
BASOPHILS NFR BLD AUTO: 1.2 % (ref 0–2.5)
DEPRECATED RDW RBC AUTO: 40.9 FL (ref 37–54)
EOSINOPHIL # BLD AUTO: 0.24 10*3/MM3 (ref 0–0.7)
EOSINOPHIL NFR BLD AUTO: 3.5 % (ref 0–7)
ERYTHROCYTE [DISTWIDTH] IN BLOOD BY AUTOMATED COUNT: 12.3 % (ref 11.5–14.5)
FERRITIN SERPL-MCNC: 13.6 NG/ML (ref 6.24–137)
HCT VFR BLD AUTO: 34.1 % (ref 37–47)
HGB BLD-MCNC: 11.3 G/DL (ref 12–16)
IMM GRANULOCYTES # BLD: 0.03 10*3/MM3 (ref 0–0.06)
IMM GRANULOCYTES NFR BLD: 0.4 % (ref 0–0.6)
IRON 24H UR-MRATE: 45 MCG/DL (ref 37–181)
IRON SATN MFR SERPL: 13 % (ref 11–46)
LYMPHOCYTES # BLD AUTO: 4.11 10*3/MM3 (ref 0.6–3.4)
LYMPHOCYTES NFR BLD AUTO: 59.7 % (ref 10–50)
MCH RBC QN AUTO: 30.2 PG (ref 27–31)
MCHC RBC AUTO-ENTMCNC: 33.1 G/DL (ref 30–37)
MCV RBC AUTO: 91.2 FL (ref 81–99)
MONOCYTES # BLD AUTO: 0.5 10*3/MM3 (ref 0–0.9)
MONOCYTES NFR BLD AUTO: 7.3 % (ref 0–12)
NEUTROPHILS # BLD AUTO: 1.92 10*3/MM3 (ref 2–6.9)
NEUTROPHILS NFR BLD AUTO: 27.9 % (ref 37–80)
NRBC BLD MANUAL-RTO: 0 /100 WBC (ref 0–0)
PLAT MORPH BLD: NORMAL
PLATELET # BLD AUTO: 216 10*3/MM3 (ref 130–400)
PMV BLD AUTO: 10.4 FL (ref 6–12)
RBC # BLD AUTO: 3.74 10*6/MM3 (ref 4.2–5.4)
RBC MORPH BLD: NORMAL
TIBC SERPL-MCNC: 347 MCG/DL (ref 261–497)
WBC MORPH BLD: NORMAL
WBC NRBC COR # BLD: 6.88 10*3/MM3 (ref 4.8–10.8)

## 2017-11-03 PROCEDURE — 99195 PHLEBOTOMY: CPT

## 2017-11-03 PROCEDURE — 85025 COMPLETE CBC W/AUTO DIFF WBC: CPT

## 2017-11-03 PROCEDURE — 36415 COLL VENOUS BLD VENIPUNCTURE: CPT

## 2017-11-03 PROCEDURE — 85007 BL SMEAR W/DIFF WBC COUNT: CPT

## 2017-11-03 PROCEDURE — 83550 IRON BINDING TEST: CPT

## 2017-11-03 PROCEDURE — 82728 ASSAY OF FERRITIN: CPT

## 2017-11-03 PROCEDURE — 83540 ASSAY OF IRON: CPT

## 2017-11-03 RX ORDER — SODIUM CHLORIDE 9 MG/ML
250 INJECTION, SOLUTION INTRAVENOUS ONCE
Status: CANCELLED | OUTPATIENT
Start: 2017-11-03

## 2017-11-03 RX ORDER — SODIUM CHLORIDE 9 MG/ML
250 INJECTION, SOLUTION INTRAVENOUS ONCE
Status: DISCONTINUED | OUTPATIENT
Start: 2017-11-03 | End: 2017-11-03 | Stop reason: HOSPADM

## 2017-11-09 ENCOUNTER — OFFICE VISIT (OUTPATIENT)
Dept: ONCOLOGY | Facility: CLINIC | Age: 32
End: 2017-11-09

## 2017-11-09 VITALS
SYSTOLIC BLOOD PRESSURE: 112 MMHG | BODY MASS INDEX: 40.24 KG/M2 | WEIGHT: 220 LBS | RESPIRATION RATE: 18 BRPM | HEART RATE: 70 BPM | DIASTOLIC BLOOD PRESSURE: 80 MMHG | TEMPERATURE: 97.9 F

## 2017-11-09 DIAGNOSIS — E83.110 HEREDITARY HEMOCHROMATOSIS (HCC): Primary | ICD-10-CM

## 2017-11-09 PROCEDURE — 99213 OFFICE O/P EST LOW 20 MIN: CPT | Performed by: INTERNAL MEDICINE

## 2017-11-09 NOTE — PROGRESS NOTES
"CHIEF COMPLAINT: Management of hemochromatosis    Problem List:  Oncology/Hematology History    1. Compound heterozygous C282y and H63D hemochromatosis:   a) Baseline MRI of the abdomen, 05/18/2016 revealed moderate iron deposition and overload, estimated at 270 umol per gram by [facility] protocol.  No other significant upper abdominal pathology. Liver otherwise appears unremarkable.   2. History of hysterectomy.   3. History of neurocardiogenic syncope status post cardiac ablation by Dr. Gentile.   4.  Passage of kidney stone with retrieval July 2016        Hemochromatosis    5/1/2007 Initial Diagnosis     Hemochromatosis         9/27/2017 Imaging     MRI liver iron quantitation  Impression:     Moderate-to-severe iron deposition in the liver measuring  280 umol per gram with 300 umol per gram defining \"major iron overload\".  In May 2016 the measurement was 270 umol per gram.                 10/17/2017 Imaging     CT of the abdomen and pelvis with contrast: Changes of mild hepato-steatosis.  Rim-enhancing lesion within the right ovary possibly representing a resolving cyst.            HISTORY OF PRESENT ILLNESS:  The patient is a 31 y.o. female, here for follow up on management of Hemochromatosis.  She drags all the time.  This occurred before the phlebotomies and has not improved with her phlebotomies.  Her ferritin is down to 13.6 with a hemoglobin down to 11.3.  She has yet to see rheumatology.  Is having trouble defecating and is due for endoscopy with Dr. Spence next week.  She's having substernal chest pains which are not new. She has had neurocardiogenic syncope and cardiac ablations by Dr. Gentile, but she has not made him aware of the chest pain she has had periodically.  They're not exertional.  She has leg cramps after the weekly phlebotomies over the last 6 weeks.      Past Medical History:   Diagnosis Date   • Cardiac abnormality    • Depression 12/16/2016   • Fibromyalgia 12/16/2016    " probable   • Hemochromatosis    • Hypertension 12/16/2016   • Inappropriate sinus tachycardia 12/16/2016    Electrophysiology study with radiofrequency ablation, June 2011 at [facility] in [place], [place], no data.   Echocardiogram February 2016: EF 60% to 65%, impaired relaxation noted. Trace MR, trace TR.    • Kidney stone    • Migraines    • Monospot test positive     History of positive Monospot.     • Neurocardiogenic syncope     with tachycardia. Pt reports she had cardiac ablation in 2011.     Past Surgical History:   Procedure Laterality Date   • CYSTOSCOPY URETEROSCOPY Left 7/10/2016    Procedure: CYSTOSCOPY, LEFT URETEROSCOPY, STONE EXTRACTION, LEFT URETERAL STENT INSERTION UNDER FLUROSCOPY;  Surgeon: Bernardo Simental MD;  Location: [facility] OR;  Service:    • HYSTERECTOMY         No Known Allergies    Family History and Social History reviewed and changed as necessary      REVIEW OF SYSTEM:   Review of Systems   Constitutional: Negative for appetite change, chills, diaphoresis, fatigue, fever and unexpected weight change.   HENT:   Negative for mouth sores, sore throat and trouble swallowing.    Eyes: Negative for icterus.   Respiratory: Negative for cough, hemoptysis and shortness of breath.    Cardiovascular: Negative for chest pain, leg swelling and palpitations.   Gastrointestinal: Negative for abdominal distention, abdominal pain, blood in stool, constipation, diarrhea, nausea and vomiting.   Endocrine: Negative for hot flashes.   Genitourinary: Negative for bladder incontinence, difficulty urinating, dysuria, frequency and hematuria.    Musculoskeletal: Negative for gait problem, neck pain and neck stiffness.   Skin: Negative for rash.   Neurological: Negative for dizziness, gait problem, headaches, light-headedness and numbness.   Hematological: Negative for adenopathy. Does not bruise/bleed easily.   Psychiatric/Behavioral: Negative for depression. The patient is not nervous/anxious.     All other systems reviewed and are negative.       PHYSICAL EXAM    Vitals:    11/09/17 0823   BP: 112/80   Pulse: 70   Resp: 18   Temp: 97.9 °F (36.6 °C)   Weight: 220 lb (99.8 kg)     Constitutional: Appears well-developed and well-nourished. No distress.   ECOG: (1) Restricted in physically strenuous activity, ambulatory and able to do work of light nature  HENT:   Head: Normocephalic.   Mouth/Throat: Oropharynx is clear and moist.   Eyes: Conjunctivae are normal. Pupils are equal, round, and reactive to light. No scleral icterus.   Neck: Neck supple. No JVD present. No thyromegaly present.   Cardiovascular: Normal rate, regular rhythm and normal heart sounds.    Pulmonary/Chest: Breath sounds normal. No respiratory distress.   Abdominal: Soft. Exhibits no distension and no mass. There is no hepatosplenomegaly. There is no tenderness. There is no rebound and no guarding.   Musculoskeletal:Exhibits no edema, tenderness or deformity.   Neurological: Alert and oriented to person, place, and time. Exhibits normal muscle tone.   Skin: No ecchymosis, no petechiae and no rash noted. Not diaphoretic. No cyanosis. Nails show no clubbing.   Psychiatric: Normal mood and affect.   Vitals reviewed.      Infusion on 11/03/2017   Component Date Value Ref Range Status   • Ferritin 11/03/2017 13.60  6.24 - 137.00 ng/mL Final   • Iron 11/03/2017 45  37 - 181 mcg/dL Final    Specimen hemolyzed.  Results may be affected.   • TIBC 11/03/2017 347  261 - 497 mcg/dL Final   • Iron Saturation 11/03/2017 13  11 - 46 % Final   • WBC 11/03/2017 6.88  4.80 - 10.80 10*3/mm3 Final   • RBC 11/03/2017 3.74* 4.20 - 5.40 10*6/mm3 Final   • Hemoglobin 11/03/2017 11.3* 12.0 - 16.0 g/dL Final   • Hematocrit 11/03/2017 34.1* 37.0 - 47.0 % Final   • MCV 11/03/2017 91.2  81.0 - 99.0 fL Final   • MCH 11/03/2017 30.2  27.0 - 31.0 pg Final   • MCHC 11/03/2017 33.1  30.0 - 37.0 g/dL Final   • RDW 11/03/2017 12.3  11.5 - 14.5 % Final   • RDW-SD 11/03/2017  40.9  37.0 - 54.0 fl Final   • MPV 11/03/2017 10.4  6.0 - 12.0 fL Final   • Platelets 11/03/2017 216  130 - 400 10*3/mm3 Final   • Neutrophil % 11/03/2017 27.9* 37.0 - 80.0 % Final   • Lymphocyte % 11/03/2017 59.7* 10.0 - 50.0 % Final   • Monocyte % 11/03/2017 7.3  0.0 - 12.0 % Final   • Eosinophil % 11/03/2017 3.5  0.0 - 7.0 % Final   • Basophil % 11/03/2017 1.2  0.0 - 2.5 % Final   • Immature Grans % 11/03/2017 0.4  0.0 - 0.6 % Final   • Neutrophils, Absolute 11/03/2017 1.92* 2.00 - 6.90 10*3/mm3 Final   • Lymphocytes, Absolute 11/03/2017 4.11* 0.60 - 3.40 10*3/mm3 Final   • Monocytes, Absolute 11/03/2017 0.50  0.00 - 0.90 10*3/mm3 Final   • Eosinophils, Absolute 11/03/2017 0.24  0.00 - 0.70 10*3/mm3 Final   • Basophils, Absolute 11/03/2017 0.08  0.00 - 0.20 10*3/mm3 Final   • Immature Grans, Absolute 11/03/2017 0.03  0.00 - 0.06 10*3/mm3 Final   • nRBC 11/03/2017 0.0  0.0 - 0.0 /100 WBC Final   • RBC Morphology 11/03/2017 Normal  Normal Final   • WBC Morphology 11/03/2017 Normal  Normal Final   • Platelet Morphology 11/03/2017 Normal  Normal Final   Infusion on 10/27/2017   Component Date Value Ref Range Status   • WBC 10/27/2017 6.80  4.80 - 10.80 10*3/mm3 Final   • RBC 10/27/2017 4.26  4.20 - 5.40 10*6/mm3 Final   • Hemoglobin 10/27/2017 13.1  12.0 - 16.0 g/dL Final   • Hematocrit 10/27/2017 38.8  37.0 - 47.0 % Final   • MCV 10/27/2017 91.1  81.0 - 99.0 fL Final   • MCH 10/27/2017 30.8  27.0 - 31.0 pg Final   • MCHC 10/27/2017 33.8  30.0 - 37.0 g/dL Final   • RDW 10/27/2017 12.6  11.5 - 14.5 % Final   • RDW-SD 10/27/2017 41.4  37.0 - 54.0 fl Final   • MPV 10/27/2017 10.4  6.0 - 12.0 fL Final   • Platelets 10/27/2017 214  130 - 400 10*3/mm3 Final   • Scan Slide 10/27/2017    Final    See Manual Differential Results   • Neutrophil % 10/27/2017 27.0* 37.0 - 80.0 % Final   • Lymphocyte % 10/27/2017 58.0* 10.0 - 50.0 % Final   • Monocyte % 10/27/2017 8.0  0.0 - 12.0 % Final   • Eosinophil % 10/27/2017 3.0  0.0 - 7.0  % Final   • Basophil % 10/27/2017 1.0  0.0 - 2.5 % Final   • Bands %  10/27/2017 2.0  0.0 - 6.0 % Final   • Atypical Lymphocyte % 10/27/2017 1.0* 0.0 - 0.0 % Final   • Neutrophils Absolute 10/27/2017 1.97* 2.00 - 6.90 10*3/mm3 Final   • Lymphocytes Absolute 10/27/2017 3.94* 0.60 - 3.40 10*3/mm3 Final   • Monocytes Absolute 10/27/2017 0.54  0.00 - 0.90 10*3/mm3 Final   • Eosinophils Absolute 10/27/2017 0.20  0.00 - 0.70 10*3/mm3 Final   • Basophils Absolute 10/27/2017 0.07  0.00 - 0.20 10*3/mm3 Final   • Anisocytosis 10/27/2017 Slight/1+  None Seen Final   • Ovalocytes 10/27/2017 Slight/1+  None Seen Final   • Poikilocytes 10/27/2017 Slight/1+  None Seen Final   • WBC Morphology 10/27/2017 Normal  Normal Final   • Platelet Estimate 10/27/2017 Adequate  Normal Final   • Clumped Platelets 10/27/2017   None Seen Final    Slightly plt clumps seen.    • Large Platelets 10/27/2017 Slight/1+  None Seen Final   Infusion on 10/12/2017   Component Date Value Ref Range Status   • Ferritin 10/12/2017 36.50  6.24 - 137.00 ng/mL Final   • Iron 10/12/2017 91  37 - 181 mcg/dL Final   • TIBC 10/12/2017 344  261 - 497 mcg/dL Final   • Iron Saturation 10/12/2017 26  11 - 46 % Final   • WBC 10/12/2017 8.70  4.80 - 10.80 10*3/mm3 Final   • RBC 10/12/2017 4.16* 4.20 - 5.40 10*6/mm3 Final   • Hemoglobin 10/12/2017 12.7  12.0 - 16.0 g/dL Final   • Hematocrit 10/12/2017 37.6  37.0 - 47.0 % Final   • MCV 10/12/2017 90.4  81.0 - 99.0 fL Final   • MCH 10/12/2017 30.5  27.0 - 31.0 pg Final   • MCHC 10/12/2017 33.8  30.0 - 37.0 g/dL Final   • RDW 10/12/2017 12.7  11.5 - 14.5 % Final   • RDW-SD 10/12/2017 41.3  37.0 - 54.0 fl Final   • MPV 10/12/2017 10.2  6.0 - 12.0 fL Final   • Platelets 10/12/2017 194  130 - 400 10*3/mm3 Final   • Neutrophil % 10/12/2017 38.1  37.0 - 80.0 % Final   • Lymphocyte % 10/12/2017 48.9  10.0 - 50.0 % Final   • Monocyte % 10/12/2017 7.5  0.0 - 12.0 % Final   • Eosinophil % 10/12/2017 4.3  0.0 - 7.0 % Final   • Basophil  % 10/12/2017 0.7  0.0 - 2.5 % Final   • Immature Grans % 10/12/2017 0.5  0.0 - 0.6 % Final   • Neutrophils, Absolute 10/12/2017 3.33  2.00 - 6.90 10*3/mm3 Final   • Lymphocytes, Absolute 10/12/2017 4.25* 0.60 - 3.40 10*3/mm3 Final   • Monocytes, Absolute 10/12/2017 0.65  0.00 - 0.90 10*3/mm3 Final   • Eosinophils, Absolute 10/12/2017 0.37  0.00 - 0.70 10*3/mm3 Final   • Basophils, Absolute 10/12/2017 0.06  0.00 - 0.20 10*3/mm3 Final   • Immature Grans, Absolute 10/12/2017 0.04  0.00 - 0.06 10*3/mm3 Final   • nRBC 10/12/2017 0.0  0.0 - 0.0 /100 WBC Final   Infusion on 10/05/2017   Component Date Value Ref Range Status   • Ferritin 10/05/2017 69.90  6.24 - 137.00 ng/mL Final   • Iron 10/05/2017 80  37 - 181 mcg/dL Final   • TIBC 10/05/2017 324  261 - 497 mcg/dL Final   • Iron Saturation 10/05/2017 25  11 - 46 % Final       Assessment/Plan     1.  Fatigue unexplained  2. Malar rash  3. Joint pains  4. Substernal chest pain  5. Constipation  6. Hereditary hemochromatosis compound heterozygote with elevated liver iron on MRI iron quantitation    Discussion: I have reviewed her labs.  Her ferritin is down to 13.6 with a hemoglobin 11.3.  She does not see rheumatology ~end of the month but is due to see Dr. Spence next week.  I asked her to have him do an EGD and light of her substernal chest pains and to also make Dr. Fontana only aware of these chest pains for him to evaluate.  We will hold on further phlebotomies until the new year with repeat ferritin, CBC, and CMP at that time.  When her ferritin is back up over 50 we will go back to monthly phlebotomies with a little less aggressive initial approach.  Our phlebotomies have not helped her fatigue.  With her red hair, green eyes, light complexion, malar rash, joint pains I'm concerned about an autoimmune process causing her general fatigue and aches.  In May 2016 she had a negative KULWINDER and rheumatoid factor and normal sedimentation rate but I'm still suspicious of some  autoimmune process causing these nondescript pains, malar erythema, and fatigue.      Yonas Pavon MD    11/09/2017

## 2017-11-16 ENCOUNTER — LAB REQUISITION (OUTPATIENT)
Dept: LAB | Facility: HOSPITAL | Age: 32
End: 2017-11-16

## 2017-11-16 ENCOUNTER — OUTSIDE FACILITY SERVICE (OUTPATIENT)
Dept: GASTROENTEROLOGY | Facility: CLINIC | Age: 32
End: 2017-11-16

## 2017-11-16 DIAGNOSIS — K21.9 GASTRO-ESOPHAGEAL REFLUX DISEASE WITHOUT ESOPHAGITIS: ICD-10-CM

## 2017-11-16 PROCEDURE — 43235 EGD DIAGNOSTIC BRUSH WASH: CPT | Performed by: INTERNAL MEDICINE

## 2017-11-16 PROCEDURE — 45378 DIAGNOSTIC COLONOSCOPY: CPT | Performed by: INTERNAL MEDICINE

## 2017-11-16 PROCEDURE — G0500 MOD SEDAT ENDO SERVICE >5YRS: HCPCS | Performed by: INTERNAL MEDICINE

## 2017-11-16 PROCEDURE — 88305 TISSUE EXAM BY PATHOLOGIST: CPT | Performed by: INTERNAL MEDICINE

## 2017-11-17 LAB
CYTO UR: NORMAL
LAB AP CASE REPORT: NORMAL
LAB AP CLINICAL INFORMATION: NORMAL
Lab: NORMAL
PATH REPORT.FINAL DX SPEC: NORMAL
PATH REPORT.GROSS SPEC: NORMAL

## 2017-11-20 ENCOUNTER — TELEPHONE (OUTPATIENT)
Dept: GASTROENTEROLOGY | Facility: CLINIC | Age: 32
End: 2017-11-20

## 2017-11-20 NOTE — TELEPHONE ENCOUNTER
----- Message from Jared Spence MD sent at 11/18/2017  8:25 PM EST -----  Please call Cristina and inform her that she does not have h pylori.    Dr. Spence

## 2017-11-22 ENCOUNTER — TELEPHONE (OUTPATIENT)
Dept: GASTROENTEROLOGY | Facility: CLINIC | Age: 32
End: 2017-11-22

## 2017-11-28 ENCOUNTER — OFFICE VISIT (OUTPATIENT)
Dept: OBSTETRICS AND GYNECOLOGY | Facility: CLINIC | Age: 32
End: 2017-11-28

## 2017-11-28 VITALS
BODY MASS INDEX: 41.72 KG/M2 | SYSTOLIC BLOOD PRESSURE: 124 MMHG | DIASTOLIC BLOOD PRESSURE: 70 MMHG | HEIGHT: 61 IN | WEIGHT: 221 LBS

## 2017-11-28 DIAGNOSIS — Z01.419 WOMEN'S ANNUAL ROUTINE GYNECOLOGICAL EXAMINATION: Primary | ICD-10-CM

## 2017-11-28 PROBLEM — Z90.710 S/P HYSTERECTOMY: Status: ACTIVE | Noted: 2017-11-28

## 2017-11-28 PROCEDURE — 99395 PREV VISIT EST AGE 18-39: CPT | Performed by: OBSTETRICS & GYNECOLOGY

## 2017-11-28 RX ORDER — OMEPRAZOLE 20 MG/1
CAPSULE, DELAYED RELEASE ORAL
Refills: 12 | COMMUNITY
Start: 2017-11-18 | End: 2018-11-26 | Stop reason: SDUPTHER

## 2017-11-28 RX ORDER — METHOCARBAMOL 750 MG/1
TABLET ORAL
Refills: 3 | COMMUNITY
Start: 2017-10-31 | End: 2018-11-30

## 2017-11-28 NOTE — PROGRESS NOTES
EMERGENCY DEPARTMENT ENCOUNTER    CHIEF COMPLAINT  Chief Complaint: facial numbness  History given by: pt  History limited by: nothing  Room Number: 19/19  PMD: No Known Provider      HPI:  Pt is a 51 y.o. female who presents complaining of R sided facial numbness onset 2 days ago. Pt also reports watery eyes & headache. Pt's R arm feels cold, but she does not have any numbness or tingling. Pt's has a hx of Raynaud's & hyperlipidemia.    Duration:  2 days  Onset: gradual  Timing: constant  Location: R side of face  Radiation: none reported  Quality: numbness  Intensity/Severity: moderate  Associated Symptoms: watery eyes & headache  Aggravating Factors: none reported  Alleviating Factors: none reported  Previous Episodes: none reported  Treatment before arrival: none reported    PAST MEDICAL HISTORY  Active Ambulatory Problems     Diagnosis Date Noted   • No Active Ambulatory Problems     Resolved Ambulatory Problems     Diagnosis Date Noted   • No Resolved Ambulatory Problems     Past Medical History   Diagnosis Date   • Raynaud phenomenon        PAST SURGICAL HISTORY  Past Surgical History   Procedure Laterality Date   • Tubal abdominal ligation     • Endometrial ablation     • Tonsillectomy         FAMILY HISTORY  History reviewed. No pertinent family history.    SOCIAL HISTORY  Social History     Social History   • Marital status: Single     Spouse name: N/A   • Number of children: N/A   • Years of education: N/A     Occupational History   • Not on file.     Social History Main Topics   • Smoking status: Never Smoker   • Smokeless tobacco: Not on file   • Alcohol use Yes      Comment: social   • Drug use: Not on file   • Sexual activity: Not on file     Other Topics Concern   • Not on file     Social History Narrative   • No narrative on file       ALLERGIES  Bactrim [sulfamethoxazole-trimethoprim]    REVIEW OF SYSTEMS  Review of Systems   Constitutional: Negative for chills and fever.        R arm feels cold.  "Subjective   Chief Complaint   Patient presents with   • Annual Exam     ovarian cyst /lower abd pain / rigid area around opening of vag / nodules under breasts     Cristina Martínez is a 32 y.o. year old  who is S/P hysterectomy presenting to be seen for her annual exam.  She is complaining of some occasional spasms and pain and thinks there is a cyst on her ovary.  It's bilateral she doesn't have any problems or pain during intercourse.  There is a rigid area around the opening to her vagina on the left side.  She's noticed this since her delivery in .  This flares up and then resolves.  It's more of a soreness no bleeding.  She also feels some nodules been under her breasts.    SEXUAL Hx:  She is currently sexually active.  In the past year there has not been new sexual partners.    Condoms are never used.  She would not like to be screened for STD's at today's exam.  HEALTH Hx:  She exercises regularly: no (and has no plans to become more active).  She wears her seat belt: yes.  She has concerns about domestic violence: no.      The following portions of the patient's history were reviewed and updated as appropriate:problem list, current medications, allergies, past family history, past medical history, past social history and past surgical history.    Smoking status: Never Smoker                                                              Smokeless status: Never Used                        Review of Systems          Gastointestinal POS: constipation (chronic)    NEG: bloating, change in bowel habits, melena or reflux symptoms   Genitourinary POS: nothing reported    NEG: dysuria or hematuria           Breast POS: Nodules under her breast    NEG: persistent breast lump, skin dimpling or nipple discharge        Objective   /70  Ht 61.25\" (155.6 cm)  Wt 221 lb (100 kg)  LMP  (LMP Unknown)  BMI 41.42 kg/m2    General:  well developed; well nourished  no acute distress  appears stated "   HENT: Negative for sore throat and trouble swallowing.    Eyes: Negative for visual disturbance.        Watery eyes   Respiratory: Negative for cough and shortness of breath.    Cardiovascular: Negative for chest pain, palpitations and leg swelling.   Gastrointestinal: Negative for abdominal pain, diarrhea and vomiting.   Endocrine: Negative.    Genitourinary: Negative for decreased urine volume, dysuria and frequency.   Musculoskeletal: Negative for neck pain.   Skin: Negative for rash.   Allergic/Immunologic: Negative.    Neurological: Positive for facial asymmetry (R sided facial droop) and numbness (R side of face). Negative for syncope, weakness and headaches.   Hematological: Negative.    Psychiatric/Behavioral: Negative.    All other systems reviewed and are negative.      PHYSICAL EXAM  ED Triage Vitals   Temp Heart Rate Resp BP SpO2   01/12/17 1445 01/12/17 1537 01/12/17 1445 01/12/17 1537 01/12/17 1537   97.7 °F (36.5 °C) 99 18 140/90 99 %      Temp src Heart Rate Source Patient Position BP Location FiO2 (%)   01/12/17 1445 01/12/17 1537 01/12/17 1537 01/12/17 1537 --   Tympanic Monitor Sitting Right arm        Physical Exam   Constitutional: She is oriented to person, place, and time and well-developed, well-nourished, and in no distress. No distress.   HENT:   Head: Normocephalic.   Eyes: EOM are normal. Pupils are equal, round, and reactive to light.   Pt's R eye will not close completely.   Neck: Normal range of motion.   Cardiovascular: Normal rate and regular rhythm.    No murmur heard.  Pulmonary/Chest: Effort normal and breath sounds normal. No respiratory distress.   Abdominal: Soft. There is no tenderness. There is no rebound and no guarding.   Musculoskeletal: Normal range of motion. She exhibits no edema.   Neurological: She is alert and oriented to person, place, and time. She displays facial asymmetry (R sided facial droop involving the forehead.). She displays normal speech. GCS score is  age  obese - Body mass index is 41.42 kg/(m^2).   Skin:  No suspicious lesions seen   Thyroid: not examined   Breasts:  Examined in supine position  Symmetric without masses or skin dimpling  Nipples normal without inversion, lesions or discharge  There are no palpable axillary nodes  There may be one small skin tag under the panniculus of her right breast.  No breast masses   Abdomen: soft, non-tender; no masses  no umbilical or inginual hernias are present  no hepato-splenomegaly   Pelvis: Clinical staff was present for exam  External genitalia:  normal appearance of the external genitalia including Bartholin's and Wellford's glands. The area in question on her left breast is some pigmented labial tissue.  There is no evidence of any condyloma.  :  urethral meatus normal;  Uterus:  absent.  Adnexa:  normal bimanual exam of the adnexa.  Rectal:  digital rectal exam not performed; anus visually normal appearing.        Assessment   1. Normal GYN examination  2. Multiple medical issues including overweight hypertension high cholesterol fatigue depression/anxiety.  She reports a CT scan showed that she may have a cyst on her ovary this was done to rule out lupus.  3. I do not see any evidence of any abnormality on her breast.  The area in question around the labia is normal and I do not feel a masses on her ovaries so don't think an ultrasound as indicated at this time.  She voices understanding.  Her mother was present with her today     Plan   1. 1000 mg calcium in divided doses ; ideally in her diet  2. Regular exercise  3.   Follow up for annual exam    New Medications Ordered This Visit   Medications   • D3-50 61796 units capsule     Sig: TAKE ONCE CAPSULE ONCE A WEEK.     Refill:  3   • omeprazole (priLOSEC) 20 MG capsule     Sig: TAKE 1 CAPSULE EVERY DAY     Refill:  12          This note was electronically signed.    Otf Young M.D.  November 28, 2017   15.   Muscular strength is 5/5 and symmetric in the bilateral upper and lower extremities.  The deep tendon reflexes are normal and symmetric, there is no sensory deficit in her extremities.   Skin: Skin is warm and dry. No rash noted.   Psychiatric: Mood and affect normal.   Nursing note and vitals reviewed.      LAB RESULTS  Lab Results (last 24 hours)     ** No results found for the last 24 hours. **            RADIOLOGY  No orders to display          PROCEDURES  Procedures      PROGRESS AND CONSULTS  ED Course           MEDICAL DECISION MAKING  Results were reviewed/discussed with the patient and they were also made aware of online access. Pt also made aware that some labs, such as cultures, will not be resulted during ER visit and follow up with PMD is necessary.     MDM  Number of Diagnoses or Management Options  Bell's palsy:   Patient Progress  Patient progress: stable         DIAGNOSIS  Final diagnoses:   Bell's palsy       DISPOSITION  DISCHARGE    Patient discharged in stable condition.    Reviewed implications of results, diagnosis, meds, responsibility to follow up, warning signs and symptoms of possible worsening, potential complications and reasons to return to ER.    Patient/Family voiced understanding of above instructions.    Discussed plan for discharge, as there is no emergent indication for admission.  Pt/family is agreeable and understands need for follow up and repeat testing.  Pt is aware that discharge does not mean that nothing is wrong but it indicates no emergency is present that requires admission and they must continue care with follow-up as given below or physician of their choice.     FOLLOW-UP  Felipe Vieyra MD  [id number] [id number] [phone]    Schedule an appointment as soon as possible for a visit           Medication List      New Prescriptions          famciclovir 500 MG tablet   Commonly known as:  FAMVIR   Take 1 tablet by mouth 3 (Three) Times a Day.        MethylPREDNISolone 4 MG tablet   Commonly known as:  MEDROL (AUBRIE)   Take as directed on package instructions.             Latest Documented Vital Signs:  As of 4:09 PM  BP- 140/90 HR- 99 Temp- 97.7 °F (36.5 °C) (Tympanic) O2 sat- 99%    --  Documentation assistance provided by angela Chacon for Dr. Kim.  Information recorded by the scribe was done at my direction and has been verified and validated by me.     Yecenia Chacon  01/12/17 1612       Ricardo Kim MD  01/12/17 [id number]

## 2017-11-30 PROBLEM — Z80.0 FAMILY HISTORY OF COLON CANCER: Status: ACTIVE | Noted: 2017-11-30

## 2018-01-25 ENCOUNTER — APPOINTMENT (OUTPATIENT)
Dept: CT IMAGING | Facility: HOSPITAL | Age: 33
End: 2018-01-25

## 2018-01-25 ENCOUNTER — HOSPITAL ENCOUNTER (EMERGENCY)
Facility: HOSPITAL | Age: 33
Discharge: HOME | End: 2018-01-26
Attending: EMERGENCY MEDICINE | Admitting: EMERGENCY MEDICINE

## 2018-01-25 DIAGNOSIS — G43.909 MIGRAINE WITHOUT STATUS MIGRAINOSUS, NOT INTRACTABLE, UNSPECIFIED MIGRAINE TYPE: ICD-10-CM

## 2018-01-25 DIAGNOSIS — R19.7 NAUSEA VOMITING AND DIARRHEA: Primary | ICD-10-CM

## 2018-01-25 DIAGNOSIS — N83.202 OVARIAN CYST, LEFT: ICD-10-CM

## 2018-01-25 DIAGNOSIS — R11.2 NAUSEA VOMITING AND DIARRHEA: Primary | ICD-10-CM

## 2018-01-25 DIAGNOSIS — R10.32 ABDOMINAL PAIN, LEFT LOWER QUADRANT: ICD-10-CM

## 2018-01-25 LAB
ALBUMIN SERPL-MCNC: 4.4 G/DL (ref 3.2–4.8)
ALBUMIN/GLOB SERPL: 1.4 G/DL (ref 1.5–2.5)
ALP SERPL-CCNC: 73 U/L (ref 25–100)
ALT SERPL W P-5'-P-CCNC: 47 U/L (ref 7–40)
ANION GAP SERPL CALCULATED.3IONS-SCNC: 9 MMOL/L (ref 3–11)
AST SERPL-CCNC: 34 U/L (ref 0–33)
BASOPHILS # BLD AUTO: 0.02 10*3/MM3 (ref 0–0.2)
BASOPHILS NFR BLD AUTO: 0.2 % (ref 0–1)
BILIRUB SERPL-MCNC: 0.3 MG/DL (ref 0.3–1.2)
BILIRUB UR QL STRIP: NEGATIVE
BUN BLD-MCNC: 16 MG/DL (ref 9–23)
BUN/CREAT SERPL: 20 (ref 7–25)
CALCIUM SPEC-SCNC: 9.4 MG/DL (ref 8.7–10.4)
CHLORIDE SERPL-SCNC: 105 MMOL/L (ref 99–109)
CLARITY UR: CLEAR
CO2 SERPL-SCNC: 21 MMOL/L (ref 20–31)
COLOR UR: YELLOW
CREAT BLD-MCNC: 0.8 MG/DL (ref 0.6–1.3)
DEPRECATED RDW RBC AUTO: 39.9 FL (ref 37–54)
EOSINOPHIL # BLD AUTO: 0.21 10*3/MM3 (ref 0–0.3)
EOSINOPHIL NFR BLD AUTO: 2.5 % (ref 0–3)
ERYTHROCYTE [DISTWIDTH] IN BLOOD BY AUTOMATED COUNT: 13.4 % (ref 11.3–14.5)
GFR SERPL CREATININE-BSD FRML MDRD: 83 ML/MIN/1.73
GLOBULIN UR ELPH-MCNC: 3.1 GM/DL
GLUCOSE BLD-MCNC: 126 MG/DL (ref 70–100)
GLUCOSE UR STRIP-MCNC: NEGATIVE MG/DL
HCT VFR BLD AUTO: 38.6 % (ref 34.5–44)
HGB BLD-MCNC: 12.3 G/DL (ref 11.5–15.5)
HGB UR QL STRIP.AUTO: NEGATIVE
HOLD SPECIMEN: NORMAL
HOLD SPECIMEN: NORMAL
IMM GRANULOCYTES # BLD: 0.02 10*3/MM3 (ref 0–0.03)
IMM GRANULOCYTES NFR BLD: 0.2 % (ref 0–0.6)
KETONES UR QL STRIP: NEGATIVE
LEUKOCYTE ESTERASE UR QL STRIP.AUTO: NEGATIVE
LIPASE SERPL-CCNC: 30 U/L (ref 6–51)
LYMPHOCYTES # BLD AUTO: 3.37 10*3/MM3 (ref 0.6–4.8)
LYMPHOCYTES NFR BLD AUTO: 39.6 % (ref 24–44)
MCH RBC QN AUTO: 25.9 PG (ref 27–31)
MCHC RBC AUTO-ENTMCNC: 31.9 G/DL (ref 32–36)
MCV RBC AUTO: 81.3 FL (ref 80–99)
MONOCYTES # BLD AUTO: 0.6 10*3/MM3 (ref 0–1)
MONOCYTES NFR BLD AUTO: 7.1 % (ref 0–12)
NEUTROPHILS # BLD AUTO: 4.28 10*3/MM3 (ref 1.5–8.3)
NEUTROPHILS NFR BLD AUTO: 50.4 % (ref 41–71)
NITRITE UR QL STRIP: NEGATIVE
PH UR STRIP.AUTO: 5.5 [PH] (ref 5–8)
PLATELET # BLD AUTO: 207 10*3/MM3 (ref 150–450)
PMV BLD AUTO: 11.1 FL (ref 6–12)
POTASSIUM BLD-SCNC: 3.9 MMOL/L (ref 3.5–5.5)
PROT SERPL-MCNC: 7.5 G/DL (ref 5.7–8.2)
PROT UR QL STRIP: NEGATIVE
RBC # BLD AUTO: 4.75 10*6/MM3 (ref 3.89–5.14)
SODIUM BLD-SCNC: 135 MMOL/L (ref 132–146)
SP GR UR STRIP: >=1.03 (ref 1–1.03)
UROBILINOGEN UR QL STRIP: NORMAL
WBC NRBC COR # BLD: 8.5 10*3/MM3 (ref 3.5–10.8)
WHOLE BLOOD HOLD SPECIMEN: NORMAL
WHOLE BLOOD HOLD SPECIMEN: NORMAL

## 2018-01-25 PROCEDURE — 85025 COMPLETE CBC W/AUTO DIFF WBC: CPT | Performed by: PHYSICIAN ASSISTANT

## 2018-01-25 PROCEDURE — 83690 ASSAY OF LIPASE: CPT | Performed by: PHYSICIAN ASSISTANT

## 2018-01-25 PROCEDURE — 74176 CT ABD & PELVIS W/O CONTRAST: CPT

## 2018-01-25 PROCEDURE — 96361 HYDRATE IV INFUSION ADD-ON: CPT

## 2018-01-25 PROCEDURE — 81003 URINALYSIS AUTO W/O SCOPE: CPT | Performed by: PHYSICIAN ASSISTANT

## 2018-01-25 PROCEDURE — 99284 EMERGENCY DEPT VISIT MOD MDM: CPT

## 2018-01-25 PROCEDURE — 80053 COMPREHEN METABOLIC PANEL: CPT | Performed by: PHYSICIAN ASSISTANT

## 2018-01-25 PROCEDURE — 96374 THER/PROPH/DIAG INJ IV PUSH: CPT

## 2018-01-25 PROCEDURE — 25010000002 METOCLOPRAMIDE PER 10 MG: Performed by: PHYSICIAN ASSISTANT

## 2018-01-25 PROCEDURE — 96375 TX/PRO/DX INJ NEW DRUG ADDON: CPT

## 2018-01-25 PROCEDURE — 25010000002 DIPHENHYDRAMINE PER 50 MG: Performed by: PHYSICIAN ASSISTANT

## 2018-01-25 PROCEDURE — 25010000002 KETOROLAC TROMETHAMINE PER 15 MG: Performed by: PHYSICIAN ASSISTANT

## 2018-01-25 PROCEDURE — 25010000002 ONDANSETRON PER 1 MG: Performed by: PHYSICIAN ASSISTANT

## 2018-01-25 RX ORDER — ONDANSETRON 2 MG/ML
4 INJECTION INTRAMUSCULAR; INTRAVENOUS ONCE
Status: COMPLETED | OUTPATIENT
Start: 2018-01-25 | End: 2018-01-25

## 2018-01-25 RX ORDER — METOCLOPRAMIDE HYDROCHLORIDE 5 MG/ML
10 INJECTION INTRAMUSCULAR; INTRAVENOUS ONCE
Status: COMPLETED | OUTPATIENT
Start: 2018-01-25 | End: 2018-01-25

## 2018-01-25 RX ORDER — PROMETHAZINE HYDROCHLORIDE 25 MG/1
25 TABLET ORAL EVERY 6 HOURS PRN
COMMUNITY
End: 2018-06-21

## 2018-01-25 RX ORDER — KETOROLAC TROMETHAMINE 15 MG/ML
15 INJECTION, SOLUTION INTRAMUSCULAR; INTRAVENOUS ONCE
Status: COMPLETED | OUTPATIENT
Start: 2018-01-25 | End: 2018-01-25

## 2018-01-25 RX ORDER — DIPHENHYDRAMINE HYDROCHLORIDE 50 MG/ML
25 INJECTION INTRAMUSCULAR; INTRAVENOUS ONCE
Status: COMPLETED | OUTPATIENT
Start: 2018-01-25 | End: 2018-01-25

## 2018-01-25 RX ADMIN — ONDANSETRON 4 MG: 2 INJECTION INTRAMUSCULAR; INTRAVENOUS at 20:53

## 2018-01-25 RX ADMIN — KETOROLAC TROMETHAMINE 15 MG: 15 INJECTION, SOLUTION INTRAMUSCULAR; INTRAVENOUS at 21:19

## 2018-01-25 RX ADMIN — SODIUM CHLORIDE 1000 ML: 9 INJECTION, SOLUTION INTRAVENOUS at 20:53

## 2018-01-25 RX ADMIN — METOCLOPRAMIDE 10 MG: 5 INJECTION, SOLUTION INTRAMUSCULAR; INTRAVENOUS at 21:19

## 2018-01-25 RX ADMIN — DIPHENHYDRAMINE HYDROCHLORIDE 25 MG: 50 INJECTION INTRAMUSCULAR; INTRAVENOUS at 21:19

## 2018-01-26 VITALS
RESPIRATION RATE: 16 BRPM | HEIGHT: 61 IN | BODY MASS INDEX: 41.54 KG/M2 | SYSTOLIC BLOOD PRESSURE: 94 MMHG | DIASTOLIC BLOOD PRESSURE: 71 MMHG | WEIGHT: 220 LBS | OXYGEN SATURATION: 98 % | HEART RATE: 86 BPM | TEMPERATURE: 97.6 F

## 2018-01-26 NOTE — DISCHARGE INSTRUCTIONS
This patient does not have evidence of orthostatic hypotension with nausea, vomiting, and diarrhea.  Lab work and CT scan of the abdomen and pelvis without contrast is within normal limits.  Recommend clear liquids for the next 48 hours.  She is to take Phenergan as prescribed by Dr. Mercedes.  Recommend close follow-up with PCP in 24-48 hours.  Return if worsening symptoms of intractable vomiting, diarrhea, dehydration, or near syncope/syncope.

## 2018-01-26 NOTE — ED PROVIDER NOTES
Subjective   HPI Comments: Ms. Cristina Martínez is a 32 year old female who presents to the ED with c/o vomiting. She reports she at fries last night and 30 minutes later experienced a sudden onset of vomiting. She states she has experienced 10 episodes of vomiting since and 30 episodes of diarrhea. She also complains of abdominal pain rated a 4/10 in severity. She also complains of chills, bloody stool and mucous in stool. She reports a history of kidney stones but states this is not a similar episode. She also has a history of neurocardiogenic syncope and hemochromatosis. She has a surgical history of hysterectomy and cardiac ablation. She had a EGD/colonoscopy 3 months ago which showed PUD. She states no one else with her got sick but she was the only one who ate fries. There are no other known complaints at this time.        Patient is a 32 y.o. female presenting with vomiting.   History provided by:  Patient  Vomiting   The primary symptoms include abdominal pain, nausea, vomiting (10 episodes) and diarrhea (over 30). Primary symptoms do not include fever. The illness began yesterday. The onset was sudden. The problem has not changed since onset.  The abdominal pain began yesterday. The abdominal pain has been unchanged since its onset. The severity of the abdominal pain is 4/10.   The illness is also significant for chills.       Review of Systems   Constitutional: Positive for chills. Negative for fever.   Gastrointestinal: Positive for abdominal pain, blood in stool, diarrhea (over 30), nausea and vomiting (10 episodes).        Mucous in stool   All other systems reviewed and are negative.      Past Medical History:   Diagnosis Date   • Cardiac abnormality    • Depression 12/16/2016   • Fibromyalgia 12/16/2016    probable   • Hemochromatosis    • Hypertension 12/16/2016   • Inappropriate sinus tachycardia 12/16/2016    Electrophysiology study with radiofrequency ablation, June 2011 at [facility] in  [place], [place], no data.   Echocardiogram February 2016: EF 60% to 65%, impaired relaxation noted. Trace MR, trace TR.    • Kidney stone    • Migraines    • Monospot test positive     History of positive Monospot.     • Neurocardiogenic syncope     with tachycardia. Pt reports she had cardiac ablation in 2011.       No Known Allergies    Past Surgical History:   Procedure Laterality Date   • CARDIAC ABLATION     • CERVICAL BIOPSY  W/ LOOP ELECTRODE EXCISION     • CYSTOSCOPY LITHOLAPAXY BLADDER STONE EXTRACTION     • CYSTOSCOPY URETEROSCOPY Left 7/10/2016    Procedure: CYSTOSCOPY, LEFT URETEROSCOPY, STONE EXTRACTION, LEFT URETERAL STENT INSERTION UNDER FLUROSCOPY;  Surgeon: Bernardo Simental MD;  Location: [facility];  Service:    • DILATATION AND CURETTAGE     • HYSTERECTOMY         Family History   Problem Relation Age of Onset   • Colon cancer Paternal Grandmother        Social History     Social History   • Marital status:      Spouse name: N/A   • Number of children: N/A   • Years of education: N/A     Social History Main Topics   • Smoking status: Never Smoker   • Smokeless tobacco: Never Used   • Alcohol use No   • Drug use: No   • Sexual activity: Yes     Partners: Male     Birth control/ protection: Surgical     Other Topics Concern   • None     Social History Narrative         Objective   Physical Exam   Constitutional: She is oriented to person, place, and time. She appears well-developed and well-nourished. No distress.   HENT:   Head: Normocephalic and atraumatic.   Nose: Nose normal.   Eyes: Conjunctivae are normal. No scleral icterus.   Neck: Normal range of motion. Neck supple.   Cardiovascular: Regular rhythm and normal heart sounds.  Tachycardia present.    Mild tachycardia   Pulmonary/Chest: Effort normal and breath sounds normal. No respiratory distress.   Abdominal: Soft. Bowel sounds are normal. She exhibits no distension. There is no hepatosplenomegaly. There is tenderness in the  suprapubic area and left lower quadrant. There is no rebound, no guarding, no CVA tenderness and no tenderness at McBurney's point. No hernia.       Musculoskeletal: Normal range of motion. She exhibits no edema.   Neurological: She is alert and oriented to person, place, and time.   Skin: Skin is warm and dry.   Psychiatric: She has a normal mood and affect. Her behavior is normal.   Nursing note and vitals reviewed.      Procedures         ED Course  ED Course   Comment By Time   Orthostatics showed the following:  Lying /77 HR 92, Sitting /87 , Standing /84 HR 90.  Pt says she is starting to get a migraine headache and requests something for that. Deborah Richards PA-C 01/25 2113   CT scan of abdomen/pelvis without contrast revealed no acute abnormality. There is a 2.1 cm cystic structure to the left adnexa, which is unchanged. Deborah Richards PA-C 01/25 2207   Pt says she is feeling some better after IVF and meds. Deborah Richards PA-C 01/25 2208   Discussed all results with patient and spouse.  Suspect gastroenteritis.  She is feeling much better.  She is not orthostatic.  Labs and UA are within normal limits, and CT scan is negative.  She is stable for discharge to home. Deborah Richards PA-C 01/25 2226   Re-examined abdomen and abdominal exam is benign and non-surgical. Deborah Richards PA-C 01/25 2231     Recent Results (from the past 24 hour(s))   Comprehensive Metabolic Panel    Collection Time: 01/25/18  8:47 PM   Result Value Ref Range    Glucose 126 (H) 70 - 100 mg/dL    BUN 16 9 - 23 mg/dL    Creatinine 0.80 0.60 - 1.30 mg/dL    Sodium 135 132 - 146 mmol/L    Potassium 3.9 3.5 - 5.5 mmol/L    Chloride 105 99 - 109 mmol/L    CO2 21.0 20.0 - 31.0 mmol/L    Calcium 9.4 8.7 - 10.4 mg/dL    Total Protein 7.5 5.7 - 8.2 g/dL    Albumin 4.40 3.20 - 4.80 g/dL    ALT (SGPT) 47 (H) 7 - 40 U/L    AST (SGOT) 34 (H) 0 - 33 U/L    Alkaline Phosphatase 73 25 - 100 U/L    Total Bilirubin 0.3 0.3 - 1.2  mg/dL    eGFR Non African Amer 83 >60 mL/min/1.73    Globulin 3.1 gm/dL    A/G Ratio 1.4 (L) 1.5 - 2.5 g/dL    BUN/Creatinine Ratio 20.0 7.0 - 25.0    Anion Gap 9.0 3.0 - 11.0 mmol/L   Lipase    Collection Time: 01/25/18  8:47 PM   Result Value Ref Range    Lipase 30 6 - 51 U/L   Green Top (Gel)    Collection Time: 01/25/18  8:47 PM   Result Value Ref Range    Extra Tube Hold for add-ons.    Light Blue Top    Collection Time: 01/25/18  8:49 PM   Result Value Ref Range    Extra Tube hold for add-on    Lavender Top    Collection Time: 01/25/18  8:49 PM   Result Value Ref Range    Extra Tube hold for add-on    Gold Top - SST    Collection Time: 01/25/18  8:49 PM   Result Value Ref Range    Extra Tube Hold for add-ons.    Urinalysis With / Culture If Indicated - Urine, Clean Catch    Collection Time: 01/25/18  8:53 PM   Result Value Ref Range    Color, UA Yellow Yellow, Straw    Appearance, UA Clear Clear    pH, UA 5.5 5.0 - 8.0    Specific Gravity, UA >=1.030 1.005 - 1.030    Glucose, UA Negative Negative    Ketones, UA Negative Negative    Bilirubin, UA Negative Negative    Blood, UA Negative Negative    Protein, UA Negative Negative    Leuk Esterase, UA Negative Negative    Nitrite, UA Negative Negative    Urobilinogen, UA 0.2 E.U./dL 0.2 - 1.0 E.U./dL   CBC Auto Differential    Collection Time: 01/25/18 10:07 PM   Result Value Ref Range    WBC 8.50 3.50 - 10.80 10*3/mm3    RBC 4.75 3.89 - 5.14 10*6/mm3    Hemoglobin 12.3 11.5 - 15.5 g/dL    Hematocrit 38.6 34.5 - 44.0 %    MCV 81.3 80.0 - 99.0 fL    MCH 25.9 (L) 27.0 - 31.0 pg    MCHC 31.9 (L) 32.0 - 36.0 g/dL    RDW 13.4 11.3 - 14.5 %    RDW-SD 39.9 37.0 - 54.0 fl    MPV 11.1 6.0 - 12.0 fL    Platelets 207 150 - 450 10*3/mm3    Neutrophil % 50.4 41.0 - 71.0 %    Lymphocyte % 39.6 24.0 - 44.0 %    Monocyte % 7.1 0.0 - 12.0 %    Eosinophil % 2.5 0.0 - 3.0 %    Basophil % 0.2 0.0 - 1.0 %    Immature Grans % 0.2 0.0 - 0.6 %    Neutrophils, Absolute 4.28 1.50 - 8.30  10*3/mm3    Lymphocytes, Absolute 3.37 0.60 - 4.80 10*3/mm3    Monocytes, Absolute 0.60 0.00 - 1.00 10*3/mm3    Eosinophils, Absolute 0.21 0.00 - 0.30 10*3/mm3    Basophils, Absolute 0.02 0.00 - 0.20 10*3/mm3    Immature Grans, Absolute 0.02 0.00 - 0.03 10*3/mm3     Note: In addition to lab results from this visit, the labs listed above may include labs taken at another facility or during a different encounter within the last 24 hours. Please correlate lab times with ED admission and discharge times for further clarification of the services performed during this visit.    CT Abdomen Pelvis Without Contrast   Final Result      1. No acute findings.      2. Non-acute findings are described above.      THIS DOCUMENT HAS BEEN ELECTRONICALLY SIGNED BY TRUE VELÁZQUEZ MD        Vitals:    01/25/18 2109 01/25/18 2110 01/25/18 2111 01/25/18 2130   BP: 106/77 111/87 104/84 91/62   Patient Position: Lying Sitting Standing    Pulse: 92 100 90 90   Resp:       Temp:       SpO2: 98% 100% 98% 99%   Weight:       Height:         Medications   sodium chloride 0.9 % bolus 1,000 mL (1,000 mL Intravenous New Bag 1/25/18 2053)   ondansetron (ZOFRAN) injection 4 mg (4 mg Intravenous Given 1/25/18 2053)   metoclopramide (REGLAN) injection 10 mg (10 mg Intravenous Given 1/25/18 2119)   ketorolac (TORADOL) injection 15 mg (15 mg Intravenous Given 1/25/18 2119)   diphenhydrAMINE (BENADRYL) injection 25 mg (25 mg Intravenous Given 1/25/18 2119)     ECG/EMG Results (last 24 hours)     ** No results found for the last 24 hours. **                        MDM    Final diagnoses:   Nausea vomiting and diarrhea   Abdominal pain, left lower quadrant   Ovarian cyst, left   Migraine without status migrainosus, not intractable, unspecified migraine type       Documentation assistance provided by alen French.  Information recorded by the scribe was done at my direction and has been verified and validated by me.     Houston French  01/25/18  2139       Houston French  01/25/18 2228       Deborah Richards PA-C  01/25/18 2235

## 2018-02-16 ENCOUNTER — OFFICE VISIT (OUTPATIENT)
Dept: ONCOLOGY | Facility: CLINIC | Age: 33
End: 2018-02-16

## 2018-02-16 VITALS
HEART RATE: 83 BPM | HEIGHT: 61 IN | DIASTOLIC BLOOD PRESSURE: 72 MMHG | RESPIRATION RATE: 14 BRPM | SYSTOLIC BLOOD PRESSURE: 106 MMHG | BODY MASS INDEX: 41.91 KG/M2 | WEIGHT: 222 LBS | TEMPERATURE: 97.8 F

## 2018-02-16 DIAGNOSIS — E83.110 HEREDITARY HEMOCHROMATOSIS (HCC): Primary | ICD-10-CM

## 2018-02-16 PROCEDURE — 99214 OFFICE O/P EST MOD 30 MIN: CPT | Performed by: INTERNAL MEDICINE

## 2018-02-16 NOTE — PROGRESS NOTES
"CHIEF COMPLAINT: Management of hemochromatosis.    Problem List:  Oncology/Hematology History    1. 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  No other significant upper abdominal pathology. Liver otherwise appears unremarkable.   2. History of hysterectomy.   3. History of neurocardiogenic syncope status post cardiac ablation by Dr. Gentile.   4.  Passage of kidney stone with retrieval July 2016        Hemochromatosis    5/1/2007 Initial Diagnosis     Hemochromatosis         9/27/2017 Imaging     MRI liver iron quantitation  Impression:     Moderate-to-severe iron deposition in the liver measuring  280 umol per gram with 300 umol per gram defining \"major iron overload\".  In May 2016 the measurement was 270 umol per gram.                 10/17/2017 Imaging     CT of the abdomen and pelvis with contrast: Changes of mild hepato-steatosis.  Rim-enhancing lesion within the right ovary possibly representing a resolving cyst.            HISTORY OF PRESENT ILLNESS:  The patient is a 32 y.o. female, here for follow up on management of Hemochromatosis.  Other than for chronic fatigue she has no specific complaints.  Does have ongoing arthralgias but has seen Dr. Myles who thinks this is just osteoarthritis secondary to her hemochromatosis.  She has not had any labs done since November.      Past Medical History:   Diagnosis Date   • Cardiac abnormality    • Depression 12/16/2016   • Fibromyalgia 12/16/2016    probable   • Hemochromatosis    • Hypertension 12/16/2016   • Inappropriate sinus tachycardia 12/16/2016    Electrophysiology study with radiofrequency ablation, June 2011 at [facility] in [place], [place], no data.   Echocardiogram February 2016: EF 60% to 65%, impaired relaxation noted. Trace MR, trace TR.    • Kidney stone    • Migraines  "   • Monospot test positive     History of positive Monospot.     • Neurocardiogenic syncope     with tachycardia. Pt reports she had cardiac ablation in 2011.     Past Surgical History:   Procedure Laterality Date   • CARDIAC ABLATION     • CERVICAL BIOPSY  W/ LOOP ELECTRODE EXCISION     • CYSTOSCOPY LITHOLAPAXY BLADDER STONE EXTRACTION     • CYSTOSCOPY URETEROSCOPY Left 7/10/2016    Procedure: CYSTOSCOPY, LEFT URETEROSCOPY, STONE EXTRACTION, LEFT URETERAL STENT INSERTION UNDER FLUROSCOPY;  Surgeon: Bernardo Simental MD;  Location: [facility];  Service:    • DILATATION AND CURETTAGE     • HYSTERECTOMY         No Known Allergies    Family History and Social History reviewed and changed as necessary      REVIEW OF SYSTEM:   Review of Systems   Constitutional: Negative for appetite change, chills, diaphoresis, fatigue, fever and unexpected weight change.   HENT:   Negative for mouth sores, sore throat and trouble swallowing.    Eyes: Negative for icterus.   Respiratory: Negative for cough, hemoptysis and shortness of breath.    Cardiovascular: Negative for chest pain, leg swelling and palpitations.   Gastrointestinal: Negative for abdominal distention, abdominal pain, blood in stool, constipation, diarrhea, nausea and vomiting.   Endocrine: Negative for hot flashes.   Genitourinary: Negative for bladder incontinence, difficulty urinating, dysuria, frequency and hematuria.    Musculoskeletal: Negative for gait problem, neck pain and neck stiffness.   Skin: Negative for rash.   Neurological: Negative for dizziness, gait problem, headaches, light-headedness and numbness.   Hematological: Negative for adenopathy. Does not bruise/bleed easily.   Psychiatric/Behavioral: Negative for depression. The patient is not nervous/anxious.    All other systems reviewed and are negative.       PHYSICAL EXAM    Vitals:    02/16/18 1442   BP: 106/72   Pulse: 83   Resp: 14   Temp: 97.8 °F (36.6 °C)   Weight: 101 kg (222 lb)   Height:  "154.9 cm (61\")     Constitutional: Appears well-developed and well-nourished. No distress.   ECOG: (0) Fully active, able to carry on all predisease performance without restriction  HENT:   Head: Normocephalic.   Mouth/Throat: Oropharynx is clear and moist.   Eyes: Conjunctivae are normal. Pupils are equal, round, and reactive to light. No scleral icterus.   Neck: Neck supple. No JVD present. No thyromegaly present.   Cardiovascular: Normal rate, regular rhythm and normal heart sounds.    Pulmonary/Chest: Breath sounds normal. No respiratory distress.   Abdominal: Soft. Exhibits no distension and no mass. There is no hepatosplenomegaly. There is no tenderness. There is no rebound and no guarding.   Musculoskeletal:Exhibits no edema, tenderness or deformity.   Neurological: Alert and oriented to person, place, and time. Exhibits normal muscle tone.   Skin: No ecchymosis, no petechiae and no rash noted. Not diaphoretic. No cyanosis. Nails show no clubbing.   Psychiatric: Normal mood and affect.   Vitals reviewed.      Admission on 01/25/2018, Discharged on 01/26/2018   Component Date Value Ref Range Status   • Glucose 01/25/2018 126* 70 - 100 mg/dL Final   • BUN 01/25/2018 16  9 - 23 mg/dL Final   • Creatinine 01/25/2018 0.80  0.60 - 1.30 mg/dL Final   • Sodium 01/25/2018 135  132 - 146 mmol/L Final   • Potassium 01/25/2018 3.9  3.5 - 5.5 mmol/L Final   • Chloride 01/25/2018 105  99 - 109 mmol/L Final   • CO2 01/25/2018 21.0  20.0 - 31.0 mmol/L Final   • Calcium 01/25/2018 9.4  8.7 - 10.4 mg/dL Final   • Total Protein 01/25/2018 7.5  5.7 - 8.2 g/dL Final   • Albumin 01/25/2018 4.40  3.20 - 4.80 g/dL Final   • ALT (SGPT) 01/25/2018 47* 7 - 40 U/L Final   • AST (SGOT) 01/25/2018 34* 0 - 33 U/L Final   • Alkaline Phosphatase 01/25/2018 73  25 - 100 U/L Final   • Total Bilirubin 01/25/2018 0.3  0.3 - 1.2 mg/dL Final   • eGFR Non  Amer 01/25/2018 83  >60 mL/min/1.73 Final   • Globulin 01/25/2018 3.1  gm/dL Final   • " A/G Ratio 01/25/2018 1.4* 1.5 - 2.5 g/dL Final   • BUN/Creatinine Ratio 01/25/2018 20.0  7.0 - 25.0 Final   • Anion Gap 01/25/2018 9.0  3.0 - 11.0 mmol/L Final   • Lipase 01/25/2018 30  6 - 51 U/L Final   • Color, UA 01/25/2018 Yellow  Yellow, Straw Final   • Appearance, UA 01/25/2018 Clear  Clear Final   • pH, UA 01/25/2018 5.5  5.0 - 8.0 Final   • Specific Gravity, UA 01/25/2018 >=1.030  1.005 - 1.030 Final   • Glucose, UA 01/25/2018 Negative  Negative Final   • Ketones, UA 01/25/2018 Negative  Negative Final   • Bilirubin, UA 01/25/2018 Negative  Negative Final   • Blood, UA 01/25/2018 Negative  Negative Final   • Protein, UA 01/25/2018 Negative  Negative Final   • Leuk Esterase, UA 01/25/2018 Negative  Negative Final   • Nitrite, UA 01/25/2018 Negative  Negative Final   • Urobilinogen, UA 01/25/2018 0.2 E.U./dL  0.2 - 1.0 E.U./dL Final   • Extra Tube 01/25/2018 hold for add-on   Final    Auto resulted   • Extra Tube 01/25/2018 Hold for add-ons.   Final    Auto resulted.   • Extra Tube 01/25/2018 hold for add-on   Final    Auto resulted   • Extra Tube 01/25/2018 Hold for add-ons.   Final    Auto resulted.   • WBC 01/25/2018 8.50  3.50 - 10.80 10*3/mm3 Final   • RBC 01/25/2018 4.75  3.89 - 5.14 10*6/mm3 Final   • Hemoglobin 01/25/2018 12.3  11.5 - 15.5 g/dL Final   • Hematocrit 01/25/2018 38.6  34.5 - 44.0 % Final   • MCV 01/25/2018 81.3  80.0 - 99.0 fL Final   • MCH 01/25/2018 25.9* 27.0 - 31.0 pg Final   • MCHC 01/25/2018 31.9* 32.0 - 36.0 g/dL Final   • RDW 01/25/2018 13.4  11.3 - 14.5 % Final   • RDW-SD 01/25/2018 39.9  37.0 - 54.0 fl Final   • MPV 01/25/2018 11.1  6.0 - 12.0 fL Final   • Platelets 01/25/2018 207  150 - 450 10*3/mm3 Final   • Neutrophil % 01/25/2018 50.4  41.0 - 71.0 % Final   • Lymphocyte % 01/25/2018 39.6  24.0 - 44.0 % Final   • Monocyte % 01/25/2018 7.1  0.0 - 12.0 % Final   • Eosinophil % 01/25/2018 2.5  0.0 - 3.0 % Final   • Basophil % 01/25/2018 0.2  0.0 - 1.0 % Final   • Immature Grans  % 01/25/2018 0.2  0.0 - 0.6 % Final   • Neutrophils, Absolute 01/25/2018 4.28  1.50 - 8.30 10*3/mm3 Final   • Lymphocytes, Absolute 01/25/2018 3.37  0.60 - 4.80 10*3/mm3 Final   • Monocytes, Absolute 01/25/2018 0.60  0.00 - 1.00 10*3/mm3 Final   • Eosinophils, Absolute 01/25/2018 0.21  0.00 - 0.30 10*3/mm3 Final   • Basophils, Absolute 01/25/2018 0.02  0.00 - 0.20 10*3/mm3 Final   • Immature Grans, Absolute 01/25/2018 0.02  0.00 - 0.03 10*3/mm3 Final       Assessment/Plan     1.  Hemochromatosis  2. Arthritis    Discussion: I suspect she just has degenerative changes secondary to hemochromatosis compound heterozygote.  She will get her ferritin level along with CBC and CMP today and if her ferritin is back up over 100 we will resume phlebotomies weekly until we get her ferritin below 100.  We will see her back in 6 months.  She is okay coming to my [place] clinic to see me.      Yonas Pavon MD    02/16/2018

## 2018-02-28 ENCOUNTER — TELEPHONE (OUTPATIENT)
Dept: ONCOLOGY | Facility: CLINIC | Age: 33
End: 2018-02-28

## 2018-02-28 NOTE — TELEPHONE ENCOUNTER
Labs from 2/22/18 reviewed, Ferritin 19, Hgb 12.6, CMP unremarkable.  No phlebotomy needed at this time.

## 2018-03-05 RX ORDER — BISOPROLOL FUMARATE 10 MG/1
TABLET, FILM COATED ORAL
Qty: 90 TABLET | Refills: 3 | Status: SHIPPED | OUTPATIENT
Start: 2018-03-05 | End: 2018-07-27 | Stop reason: SDUPTHER

## 2018-06-21 ENCOUNTER — OFFICE VISIT (OUTPATIENT)
Dept: CARDIOLOGY | Facility: CLINIC | Age: 33
End: 2018-06-21

## 2018-06-21 VITALS
DIASTOLIC BLOOD PRESSURE: 72 MMHG | HEIGHT: 61 IN | HEART RATE: 75 BPM | SYSTOLIC BLOOD PRESSURE: 128 MMHG | BODY MASS INDEX: 42.48 KG/M2 | WEIGHT: 225 LBS

## 2018-06-21 DIAGNOSIS — R55 VASOVAGAL SYNCOPE: ICD-10-CM

## 2018-06-21 DIAGNOSIS — R00.0 INAPPROPRIATE SINUS TACHYCARDIA: Primary | ICD-10-CM

## 2018-06-21 DIAGNOSIS — I10 ESSENTIAL HYPERTENSION: ICD-10-CM

## 2018-06-21 PROCEDURE — 99214 OFFICE O/P EST MOD 30 MIN: CPT | Performed by: PHYSICIAN ASSISTANT

## 2018-07-16 ENCOUNTER — TELEPHONE (OUTPATIENT)
Dept: CARDIOLOGY | Facility: CLINIC | Age: 33
End: 2018-07-16

## 2018-07-16 NOTE — TELEPHONE ENCOUNTER
Patient called to let us know that she is still having issues with high BP and continues to be dizzy. She is going to schedule an appointment with one of the PA's to hopefully be seen soon. She may also need to cancel her trip she has planned if she doesn't feel any better.

## 2018-07-27 ENCOUNTER — OFFICE VISIT (OUTPATIENT)
Dept: CARDIOLOGY | Facility: CLINIC | Age: 33
End: 2018-07-27

## 2018-07-27 VITALS
SYSTOLIC BLOOD PRESSURE: 118 MMHG | BODY MASS INDEX: 42.1 KG/M2 | HEART RATE: 81 BPM | HEIGHT: 61 IN | WEIGHT: 223 LBS | DIASTOLIC BLOOD PRESSURE: 87 MMHG

## 2018-07-27 DIAGNOSIS — I10 ESSENTIAL HYPERTENSION: ICD-10-CM

## 2018-07-27 DIAGNOSIS — R55 VASOVAGAL SYNCOPE: Primary | ICD-10-CM

## 2018-07-27 DIAGNOSIS — R00.0 INAPPROPRIATE SINUS TACHYCARDIA: ICD-10-CM

## 2018-07-27 PROCEDURE — 93000 ELECTROCARDIOGRAM COMPLETE: CPT | Performed by: PHYSICIAN ASSISTANT

## 2018-07-27 PROCEDURE — 99213 OFFICE O/P EST LOW 20 MIN: CPT | Performed by: PHYSICIAN ASSISTANT

## 2018-07-27 NOTE — PROGRESS NOTES
[facility]   OFFICE NOTE      Cristina Martínez  1985  PCP: Rahat Mercedes MD    SUBJECTIVE:   Cristina Martínez is a 32 y.o. female seen for a follow up visit regarding the following: Vasovagal syncope, IST, HTN    CC:HTN  PROBLEM LIST/PMHx:   1. Vasovagal syncope:   a. Echocardiogram, 04/12/2007, showing trace MR, TR, EF 60%.  b. Echocardiogram, March 2008: Normal LV size and function.   c. Echocardiogram, November 2013: EF 60% to 65%, trace MR.   2. Inappropriate sinus tachycardia:   a. Electrophysiology study with radiofrequency ablation, June 2011 at [facility] in [place], [place], no data.   b. Echocardiogram February 2016: EF 60% to 65%, impaired relaxation noted. Trace MR, trace TR.  3. Essential Hypertension.   4. Sinus tachycardia.   5. Migraine headaches.   6. Probable fibromyalgia.   7. Hereditary hemochromatosis, status post liver biopsy.   8. History of positive Monospot.   9. Depression.   10. Status post hysterectomy, August 2013.  HPI:   Today I saw Mrs. Carcamo in follow-up regarding history of vasovagal syncope, inappropriate sinus tachycardia, and recent elevated diastolic blood pressure with symptoms of atypical chest pain.  She is a pleasant 32-year-old female who had a remote EP study and ablation for inappropriate sinus tachycardia in 2011.  She had an echocardiogram 2 years ago revealing normal LV function.  She remote history of vasovagal syncope denies any recurrent episodes of syncope.  She states recently she's been under tremendous amount of stress and has not been sleeping well.  She has episodes of feeling hot and flushed feeling as well as some left-sided atypical sharp pain near her shoulder and chest.  She sometimes has this feelings and checks her blood pressures  andnoticed that her systolic blood pressure was 120 but her diastolic was elevated over 90.  When she checks her blood pressure which she's not having  any discomfort or hot flushed feeling she reports her blood pressures completely normal.  She states the beta blocker medication has kept her heart rate controlled she denies any difficulty with palpitations or elevated heart rate.  She is on a exercise and weight loss program has been exercises on a regular basis doing weights and treadmill without any symptoms of chest pain or shortness of breath with these activities.  She presents today for for evaluation and was concerned about her diastolic blood pressure being elevated.      ROS:  Review of Symptoms:  General: + recent weight loss on diet  Skin: no rashes, lumps, or other skin changes  HEENT: no dizziness, lightheadedness, or vision changes  Respiratory: no cough or hemoptysis  Cardiovascular: no palpitations, and tachycardia  Gastrointestinal: no black/tarry stools or diarrhea  Urinary: no change in frequency or urgency  Peripheral Vascular: no claudication or leg cramps  Musculoskeletal: no muscle or joint pain/stiffness  Psychiatric: +anxiety and excessive stress  Neurological: no sensory or motor loss, no syncope  Hematologic: no anemia, easy bruising or bleeding  Endocrine: no thyroid problems, nor heat or cold intolerance    Cardiac PMH: (Old records have been reviewed and summarized below)      Past Medical History, Past Surgical History, Family history, Social History, and Medications were all reviewed with the patient today and updated as necessary.         No Known Allergies  Patient Active Problem List   Diagnosis   • Urolithiasis   • Urinary retention   • Hypotension   • Hyperglycemia   • Abdominal pain   • Hemochromatosis   • Vasovagal syncope   • Inappropriate sinus tachycardia   • Hypertension   • History of migraine headaches   • Fibromyalgia   • Depression   • Tachycardia   • Vaginal delivery February 2013   • S/P hysterectomy 2013   • Family history of colon cancer paternal grandmother     Past Medical History:   Diagnosis Date   • Cardiac  "abnormality    • Depression 12/16/2016   • Fibromyalgia 12/16/2016    probable   • Hemochromatosis    • Hypertension 12/16/2016   • Inappropriate sinus tachycardia 12/16/2016    Electrophysiology study with radiofrequency ablation, June 2011 at [facility] in [place], [place], no data.   Echocardiogram February 2016: EF 60% to 65%, impaired relaxation noted. Trace MR, trace TR.    • Kidney stone    • Migraines    • Monospot test positive     History of positive Monospot.     • Neurocardiogenic syncope     with tachycardia. Pt reports she had cardiac ablation in 2011.     Past Surgical History:   Procedure Laterality Date   • CARDIAC ABLATION     • CERVICAL BIOPSY  W/ LOOP ELECTRODE EXCISION     • CYSTOSCOPY LITHOLAPAXY BLADDER STONE EXTRACTION     • CYSTOSCOPY URETEROSCOPY Left 7/10/2016    Procedure: CYSTOSCOPY, LEFT URETEROSCOPY, STONE EXTRACTION, LEFT URETERAL STENT INSERTION UNDER FLUROSCOPY;  Surgeon: Bernardo Simental MD;  Location: [facility];  Service:    • DILATATION AND CURETTAGE     • HYSTERECTOMY       Family History   Problem Relation Age of Onset   • Colon cancer Paternal Grandmother    • Heart disease Mother    • Bradycardia Brother      Social History   Substance Use Topics   • Smoking status: Never Smoker   • Smokeless tobacco: Never Used   • Alcohol use No         PHYSICAL EXAM:    /87 (BP Location: Left arm, Patient Position: Sitting)   Pulse 81   Ht 154.9 cm (61\")   Wt 101 kg (223 lb)   LMP  (LMP Unknown)   BMI 42.14 kg/m²        Wt Readings from Last 5 Encounters:   07/27/18 101 kg (223 lb)   06/21/18 102 kg (225 lb)   02/16/18 101 kg (222 lb)   01/25/18 99.8 kg (220 lb)   11/28/17 100 kg (221 lb)       BP Readings from Last 5 Encounters:   07/27/18 118/87   06/21/18 128/72   02/16/18 106/72   01/25/18 94/71   11/28/17 124/70       General appearance - Alert, well appearing, and in no distress   Mental status - Affect appropriate to mood.  Eyes - Sclerae anicteric,  ENMT - " Hearing grossly normal bilaterally, Dental hygiene good.  Neck - Carotids upstroke normal bilaterally, no bruits, no JVD.  Resp - Clear to auscultation, no wheezes, rales or rhonchi, symmetric air entry.  Heart - Normal rate, regular rhythm, normal S1, S2, no murmurs, rubs, clicks or gallops.  GI - Soft, nontender, nondistended, no masses or organomegaly.  Neurological - Grossly intact - normal speech, no focal findings  Musculoskeletal - No joint tenderness, deformity or swelling, no muscular tenderness noted.  Extremities - Peripheral pulses normal, no pedal edema, no clubbing or cyanosis.  Skin - Normal coloration and turgor.  Psych -  oriented to person, place, and time.    Medical problems and test results were reviewed with the patient today.     No results found for this or any previous visit (from the past 672 hour(s)).      EKG: (EKG has been independently visualized by me and summarized below)    ECG 12 Lead  Date/Time: 7/27/2018 4:34 PM  Performed by: TIKA PERRY  Authorized by: TIKA PERRY   Comparison: compared with previous ECG from 7/10/2016  Rhythm: sinus rhythm  Rate: normal  Conduction: conduction normal  ST Segments: ST segments normal  T Waves: T waves normal  QRS axis: normal  Other: no other findings  Clinical impression: normal ECG          ASSESSMENT  1. Elevated diastolic BP: Diet, exercise, weight loss, sodium restriction.    2. IST: BB therapy, remote RFA  3. Depression/Anxiety  4. Vasovagal syncope: no new events.     PLAN:   · The patient will continue to monitor blood pressure she feels a lot of her symptoms may be due to stress and anxiety as when she checks her blood pressure when she's feeling well its normal.  She is following a low calorie diet was instructed to continue to restrict sodium follow a DASH diet and increase her exercise which she is doing well with.  · Continue beta blocker therapy  · Return for follow-up as scheduled.    7/27/2018  4:29 PM    Will  Elva MITCHELL

## 2018-08-29 ENCOUNTER — LAB (OUTPATIENT)
Dept: LAB | Facility: HOSPITAL | Age: 33
End: 2018-08-29

## 2018-08-29 DIAGNOSIS — E83.110 HEREDITARY HEMOCHROMATOSIS (HCC): ICD-10-CM

## 2018-08-29 LAB
ALBUMIN SERPL-MCNC: 4.3 G/DL (ref 3.5–5)
ALBUMIN/GLOB SERPL: 1.7 G/DL (ref 1–2)
ALP SERPL-CCNC: 61 U/L (ref 38–126)
ALT SERPL W P-5'-P-CCNC: 49 U/L (ref 13–69)
ANION GAP SERPL CALCULATED.3IONS-SCNC: 15.3 MMOL/L (ref 10–20)
AST SERPL-CCNC: 37 U/L (ref 15–46)
BASOPHILS # BLD AUTO: 0.04 10*3/MM3 (ref 0–0.2)
BASOPHILS NFR BLD AUTO: 0.5 % (ref 0–2.5)
BILIRUB SERPL-MCNC: 0.3 MG/DL (ref 0.2–1.3)
BUN BLD-MCNC: 15 MG/DL (ref 7–20)
BUN/CREAT SERPL: 21.4 (ref 7.1–23.5)
CALCIUM SPEC-SCNC: 9.6 MG/DL (ref 8.4–10.2)
CHLORIDE SERPL-SCNC: 103 MMOL/L (ref 98–107)
CO2 SERPL-SCNC: 24 MMOL/L (ref 26–30)
CREAT BLD-MCNC: 0.7 MG/DL (ref 0.6–1.3)
DEPRECATED RDW RBC AUTO: 47.1 FL (ref 37–54)
EOSINOPHIL # BLD AUTO: 0.13 10*3/MM3 (ref 0–0.7)
EOSINOPHIL NFR BLD AUTO: 1.6 % (ref 0–7)
ERYTHROCYTE [DISTWIDTH] IN BLOOD BY AUTOMATED COUNT: 14.9 % (ref 11.5–14.5)
FERRITIN SERPL-MCNC: 7.44 NG/ML (ref 6.24–137)
GFR SERPL CREATININE-BSD FRML MDRD: 96 ML/MIN/1.73
GLOBULIN UR ELPH-MCNC: 2.5 GM/DL
GLUCOSE BLD-MCNC: 104 MG/DL (ref 74–98)
HCT VFR BLD AUTO: 39.2 % (ref 37–47)
HGB BLD-MCNC: 12.4 G/DL (ref 12–16)
IMM GRANULOCYTES # BLD: 0.02 10*3/MM3 (ref 0–0.06)
IMM GRANULOCYTES NFR BLD: 0.3 % (ref 0–0.6)
LYMPHOCYTES # BLD AUTO: 3.38 10*3/MM3 (ref 0.6–3.4)
LYMPHOCYTES NFR BLD AUTO: 42.6 % (ref 10–50)
MCH RBC QN AUTO: 27.2 PG (ref 27–31)
MCHC RBC AUTO-ENTMCNC: 31.6 G/DL (ref 30–37)
MCV RBC AUTO: 86 FL (ref 81–99)
MONOCYTES # BLD AUTO: 0.67 10*3/MM3 (ref 0–0.9)
MONOCYTES NFR BLD AUTO: 8.4 % (ref 0–12)
NEUTROPHILS # BLD AUTO: 3.7 10*3/MM3 (ref 2–6.9)
NEUTROPHILS NFR BLD AUTO: 46.6 % (ref 37–80)
NRBC BLD MANUAL-RTO: 0 /100 WBC (ref 0–0)
PLATELET # BLD AUTO: 181 10*3/MM3 (ref 130–400)
PMV BLD AUTO: 11 FL (ref 6–12)
POTASSIUM BLD-SCNC: 4.3 MMOL/L (ref 3.5–5.1)
PROT SERPL-MCNC: 6.8 G/DL (ref 6.3–8.2)
RBC # BLD AUTO: 4.56 10*6/MM3 (ref 4.2–5.4)
SODIUM BLD-SCNC: 138 MMOL/L (ref 137–145)
WBC NRBC COR # BLD: 7.94 10*3/MM3 (ref 4.8–10.8)

## 2018-08-29 PROCEDURE — 80053 COMPREHEN METABOLIC PANEL: CPT

## 2018-08-29 PROCEDURE — 36415 COLL VENOUS BLD VENIPUNCTURE: CPT

## 2018-08-29 PROCEDURE — 85025 COMPLETE CBC W/AUTO DIFF WBC: CPT

## 2018-08-29 PROCEDURE — 82728 ASSAY OF FERRITIN: CPT

## 2018-09-07 ENCOUNTER — OFFICE VISIT (OUTPATIENT)
Dept: ONCOLOGY | Facility: CLINIC | Age: 33
End: 2018-09-07

## 2018-09-07 VITALS
TEMPERATURE: 97.6 F | RESPIRATION RATE: 16 BRPM | OXYGEN SATURATION: 99 % | HEIGHT: 61 IN | WEIGHT: 222.5 LBS | DIASTOLIC BLOOD PRESSURE: 75 MMHG | BODY MASS INDEX: 42.01 KG/M2 | SYSTOLIC BLOOD PRESSURE: 108 MMHG | HEART RATE: 73 BPM

## 2018-09-07 DIAGNOSIS — E83.110 HEREDITARY HEMOCHROMATOSIS (HCC): Primary | ICD-10-CM

## 2018-09-07 PROCEDURE — 99213 OFFICE O/P EST LOW 20 MIN: CPT | Performed by: INTERNAL MEDICINE

## 2018-09-07 NOTE — PROGRESS NOTES
"CHIEF COMPLAINT: Management of hemochromatosis.    Problem List:  Oncology/Hematology History    1. 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  No other significant upper abdominal pathology. Liver otherwise appears unremarkable.   2. History of hysterectomy.   3. History of neurocardiogenic syncope status post cardiac ablation by Dr. Gentile.   4.  Passage of kidney stone with retrieval July 2016  5.  Diastolic hypertension  6.  Degenerative arthritis felt to be related to hemochromatosis according to pathology        Hemochromatosis    5/1/2007 Initial Diagnosis     Hemochromatosis         9/27/2017 Imaging     MRI liver iron quantitation  Impression:     Moderate-to-severe iron deposition in the liver measuring  280 umol per gram with 300 umol per gram defining \"major iron overload\".  In May 2016 the measurement was 270 umol per gram.                 10/17/2017 Imaging     CT of the abdomen and pelvis with contrast: Changes of mild hepato-steatosis.  Rim-enhancing lesion within the right ovary possibly representing a resolving cyst.            HISTORY OF PRESENT ILLNESS:  The patient is a 32 y.o. female, here for follow up on management of hemochromatosis.  Presently, her ferritin is 7.4 with a normal CMP and CBC.  Still has a malar rash and arthritis seeing rheumatology who believes her joint pain to be more related to her hemochromatosis though currently her ferritin is running low post phlebotomy November 2017 last phlebotomy.  She has had a hysterectomy and no ongoing source of bleeding.  Despite the low ferritin and ice cravings(which is improving), she is not anemic.      Past Medical History:   Diagnosis Date   • Cardiac abnormality    • Depression 12/16/2016   • Fibromyalgia 12/16/2016    probable   • Hemochromatosis    • Hypertension " 12/16/2016   • Inappropriate sinus tachycardia 12/16/2016    Electrophysiology study with radiofrequency ablation, June 2011 at [facility] in [place], [place], no data.   Echocardiogram February 2016: EF 60% to 65%, impaired relaxation noted. Trace MR, trace TR.    • Kidney stone    • Migraines    • Monospot test positive     History of positive Monospot.     • Neurocardiogenic syncope     with tachycardia. Pt reports she had cardiac ablation in 2011.     Past Surgical History:   Procedure Laterality Date   • CARDIAC ABLATION     • CERVICAL BIOPSY  W/ LOOP ELECTRODE EXCISION     • CYSTOSCOPY LITHOLAPAXY BLADDER STONE EXTRACTION     • CYSTOSCOPY URETEROSCOPY Left 7/10/2016    Procedure: CYSTOSCOPY, LEFT URETEROSCOPY, STONE EXTRACTION, LEFT URETERAL STENT INSERTION UNDER FLUROSCOPY;  Surgeon: Bernardo Simental MD;  Location: [facility];  Service:    • DILATATION AND CURETTAGE     • HYSTERECTOMY         No Known Allergies    Family History and Social History reviewed and changed as necessary      REVIEW OF SYSTEM:   Review of Systems   Constitutional: Negative for appetite change, chills, diaphoresis, fatigue, fever and unexpected weight change.   HENT:   Negative for mouth sores, sore throat and trouble swallowing.    Eyes: Negative for icterus.   Respiratory: Negative for cough, hemoptysis and shortness of breath.    Cardiovascular: Negative for chest pain, leg swelling and palpitations.   Gastrointestinal: Negative for abdominal distention, abdominal pain, blood in stool, constipation, diarrhea, nausea and vomiting.   Endocrine: Negative for hot flashes.   Genitourinary: Negative for bladder incontinence, difficulty urinating, dysuria, frequency and hematuria.    Musculoskeletal: Negative for gait problem, neck pain and neck stiffness.   Skin: Negative for rash.   Neurological: Negative for dizziness, gait problem, headaches, light-headedness and numbness.   Hematological: Negative for adenopathy. Does not  "bruise/bleed easily.   Psychiatric/Behavioral: Negative for depression. The patient is not nervous/anxious.    All other systems reviewed and are negative.       PHYSICAL EXAM    Vitals:    09/07/18 0839   BP: 108/75   Pulse: 73   Resp: 16   Temp: 97.6 °F (36.4 °C)   TempSrc: Temporal Artery    SpO2: 99%   Weight: 101 kg (222 lb 8 oz)   Height: 154.9 cm (60.98\")     Constitutional: Appears well-developed and well-nourished. No distress.   ECOG: (0) Fully active, able to carry on all predisease performance without restriction  HENT:   Head: Normocephalic.  Malar rash unchanged  Mouth/Throat: Oropharynx is clear and moist.   Eyes: Conjunctivae are normal. Pupils are equal, round, and reactive to light. No scleral icterus.   Neck: Neck supple. No JVD present. No thyromegaly present.   Cardiovascular: Normal rate, regular rhythm and normal heart sounds.    Pulmonary/Chest: Breath sounds normal. No respiratory distress.   Abdominal: Soft. Exhibits no distension and no mass. There is no hepatosplenomegaly. There is no tenderness. There is no rebound and no guarding.   Musculoskeletal:Exhibits no edema, tenderness or deformity.   Neurological: Alert and oriented to person, place, and time. Exhibits normal muscle tone.   Skin: No ecchymosis, no petechiae and no rash noted. Not diaphoretic. No cyanosis. Nails show no clubbing.   Psychiatric: Normal mood and affect.   Vitals reviewed.      No radiology results for the last 7 days       ASSESSMENT & PLAN:    1.  Compound heterozygous hemochromatosis  2. Vasovagal syncope  3. Diastolic hypertension  4. MAlar rash with arthritis    Discussion: she has not had a phlebotomy since November 2017 and is nowhere near meeting such with a ferritin actually running a little low at 7.4 but her blood counts are normal.  I'll see her back in 6 months with blood counts and iron indices and CMP prior to return.  She does have arthritis and rheumatology believes this to be due to the " hemochromatosis though, with her malar rash, I'm still concerned that this is more autoimmune than necessarily hemochromatosis.  Her hemochromatosis is under reasonable control with very low ferritin level.  Ultimately I would not phlebotomize her unless her ferritin was over 50.  Normally I would wait for the ferritin to get over 100 but given the suspicions by rheumatology that the hemochromatosis may be a culprit in her arthritis I will push a little harder but she is nowhere near needing phlebotomy at this junction.  Gas with patient 15 minutes greater than 50% spent counseling          Yonas Pavon MD    09/07/2018

## 2018-09-28 DIAGNOSIS — E83.110 HEREDITARY HEMOCHROMATOSIS (HCC): Primary | ICD-10-CM

## 2018-11-09 ENCOUNTER — LAB (OUTPATIENT)
Dept: LAB | Facility: HOSPITAL | Age: 33
End: 2018-11-09

## 2018-11-09 DIAGNOSIS — E83.110 HEREDITARY HEMOCHROMATOSIS (HCC): ICD-10-CM

## 2018-11-09 LAB
BASOPHILS # BLD AUTO: 0.07 10*3/MM3 (ref 0–0.2)
BASOPHILS NFR BLD AUTO: 0.9 % (ref 0–2.5)
DEPRECATED RDW RBC AUTO: 40.7 FL (ref 37–54)
EOSINOPHIL # BLD AUTO: 0.17 10*3/MM3 (ref 0–0.7)
EOSINOPHIL NFR BLD AUTO: 2.1 % (ref 0–7)
ERYTHROCYTE [DISTWIDTH] IN BLOOD BY AUTOMATED COUNT: 13.3 % (ref 11.5–14.5)
FERRITIN SERPL-MCNC: 22.5 NG/ML (ref 6.24–137)
HCT VFR BLD AUTO: 40.1 % (ref 37–47)
HGB BLD-MCNC: 12.9 G/DL (ref 12–16)
IMM GRANULOCYTES # BLD: 0.04 10*3/MM3 (ref 0–0.06)
IMM GRANULOCYTES NFR BLD: 0.5 % (ref 0–0.6)
LYMPHOCYTES # BLD AUTO: 2.42 10*3/MM3 (ref 0.6–3.4)
LYMPHOCYTES NFR BLD AUTO: 30.3 % (ref 10–50)
MCH RBC QN AUTO: 27 PG (ref 27–31)
MCHC RBC AUTO-ENTMCNC: 32.2 G/DL (ref 30–37)
MCV RBC AUTO: 84.1 FL (ref 81–99)
MONOCYTES # BLD AUTO: 0.77 10*3/MM3 (ref 0–0.9)
MONOCYTES NFR BLD AUTO: 9.6 % (ref 0–12)
NEUTROPHILS # BLD AUTO: 4.51 10*3/MM3 (ref 2–6.9)
NEUTROPHILS NFR BLD AUTO: 56.6 % (ref 37–80)
NRBC BLD MANUAL-RTO: 0 /100 WBC (ref 0–0)
PLATELET # BLD AUTO: 233 10*3/MM3 (ref 130–400)
PMV BLD AUTO: 10.3 FL (ref 6–12)
RBC # BLD AUTO: 4.77 10*6/MM3 (ref 4.2–5.4)
WBC NRBC COR # BLD: 7.98 10*3/MM3 (ref 4.8–10.8)

## 2018-11-09 PROCEDURE — 36415 COLL VENOUS BLD VENIPUNCTURE: CPT

## 2018-11-09 PROCEDURE — 82728 ASSAY OF FERRITIN: CPT

## 2018-11-09 PROCEDURE — 85025 COMPLETE CBC W/AUTO DIFF WBC: CPT

## 2018-11-26 ENCOUNTER — TELEPHONE (OUTPATIENT)
Dept: GASTROENTEROLOGY | Facility: CLINIC | Age: 33
End: 2018-11-26

## 2018-11-26 DIAGNOSIS — K21.9 GASTROESOPHAGEAL REFLUX DISEASE, ESOPHAGITIS PRESENCE NOT SPECIFIED: Primary | ICD-10-CM

## 2018-11-26 RX ORDER — OMEPRAZOLE 20 MG/1
20 CAPSULE, DELAYED RELEASE ORAL DAILY
Qty: 30 CAPSULE | Refills: 2 | Status: SHIPPED | OUTPATIENT
Start: 2018-11-26 | End: 2020-01-06 | Stop reason: SDUPTHER

## 2018-11-26 NOTE — TELEPHONE ENCOUNTER
Please refill the prescription for Chrysta cc me.  If she does not want to make an appointment here she can get these from her primary care physician.    Dr. Spence

## 2018-11-26 NOTE — TELEPHONE ENCOUNTER
Dr. Spence,  I spoke with patient to let her know I refilled medication for two months. She is choosing to see her PCP for more refills.

## 2018-11-30 ENCOUNTER — APPOINTMENT (OUTPATIENT)
Dept: LAB | Facility: HOSPITAL | Age: 33
End: 2018-11-30

## 2018-11-30 ENCOUNTER — OFFICE VISIT (OUTPATIENT)
Dept: OBSTETRICS AND GYNECOLOGY | Facility: CLINIC | Age: 33
End: 2018-11-30

## 2018-11-30 VITALS
DIASTOLIC BLOOD PRESSURE: 70 MMHG | WEIGHT: 221 LBS | HEIGHT: 62 IN | BODY MASS INDEX: 40.67 KG/M2 | SYSTOLIC BLOOD PRESSURE: 118 MMHG

## 2018-11-30 DIAGNOSIS — Z01.419 WOMEN'S ANNUAL ROUTINE GYNECOLOGICAL EXAMINATION: Primary | ICD-10-CM

## 2018-11-30 DIAGNOSIS — A63.0 CONDYLOMA: ICD-10-CM

## 2018-11-30 DIAGNOSIS — Z20.2 STD EXPOSURE: ICD-10-CM

## 2018-11-30 PROBLEM — Z98.890 H/O CARDIAC RADIOFREQUENCY ABLATION: Status: ACTIVE | Noted: 2018-11-30

## 2018-11-30 LAB
HBV SURFACE AG SERPL QL IA: NORMAL
HCV AB SER DONR QL: NORMAL
HIV1+2 AB SER QL: NORMAL

## 2018-11-30 PROCEDURE — 56501 DESTROY VULVA LESIONS SIM: CPT | Performed by: OBSTETRICS & GYNECOLOGY

## 2018-11-30 PROCEDURE — 86803 HEPATITIS C AB TEST: CPT | Performed by: OBSTETRICS & GYNECOLOGY

## 2018-11-30 PROCEDURE — 87340 HEPATITIS B SURFACE AG IA: CPT | Performed by: OBSTETRICS & GYNECOLOGY

## 2018-11-30 PROCEDURE — 99395 PREV VISIT EST AGE 18-39: CPT | Performed by: OBSTETRICS & GYNECOLOGY

## 2018-11-30 PROCEDURE — G0432 EIA HIV-1/HIV-2 SCREEN: HCPCS | Performed by: OBSTETRICS & GYNECOLOGY

## 2018-11-30 PROCEDURE — 36415 COLL VENOUS BLD VENIPUNCTURE: CPT | Performed by: OBSTETRICS & GYNECOLOGY

## 2018-11-30 PROCEDURE — 86592 SYPHILIS TEST NON-TREP QUAL: CPT | Performed by: OBSTETRICS & GYNECOLOGY

## 2018-11-30 RX ORDER — BISOPROLOL FUMARATE 10 MG/1
1 TABLET, FILM COATED ORAL DAILY
COMMUNITY
Start: 2016-06-07 | End: 2019-06-06 | Stop reason: SDUPTHER

## 2018-11-30 NOTE — PROGRESS NOTES
"Subjective   Chief Complaint   Patient presents with   • Annual Exam     blisters on left side of vag     Mickietah Florecita Martínez is a 33 y.o. year old  who is S/P hysterectomy presenting to be seen for her annual exam.  She is status post hysterectomy about 5 years ago.  She wonders if she has some HPV externally.  She has a firm ridge the left side of the vaginal opening.  This Gets firm and harder occasionally.  SEXUAL Hx:  She is currently sexually active.  In the past year there has not been new sexual partners.  Her  has been in the  and has had numerous affairs and including prostitutes.  He is no longer in the .  She is not sexually active in about 6 months since she found this out.  They're going to family counseling.  She's not sure if she can get over this.  I would encourage her to continue to see a counselor to work through this if possible.  The meantime we need to do some screening for STDs.  She's had a history of HPV and a conization years ago.    HEALTH Hx:  She exercises regularly: no (and has no plans to become more active).  She wears her seat belt: yes.  She has concerns about domestic violence: no.  Caffeine intake : small            The following portions of the patient's history were reviewed and updated as appropriate:problem list, current medications, allergies, past family history, past medical history, past social history and past surgical history.    Social History    Tobacco Use      Smoking status: Never Smoker      Smokeless tobacco: Never Used    Review of Systems          Gastointestinal POS: nothing reported       Genitourinary POS: nothing reported               Breast POS: some mobile hard plaqces under breaasts - no change            Objective   /70   Ht 157.5 cm (62\")   Wt 100 kg (221 lb)   LMP  (LMP Unknown)   BMI 40.42 kg/m²     General:  well developed; well nourished  no acute distress  appears stated age   Skin:  No suspicious lesions " seen   Thyroid: not examined   Breasts:  Examined in supine position  Symmetric without masses or skin dimpling  Nipples normal without inversion, lesions or discharge  There are no palpable axillary nodes   Abdomen: soft, non-tender; no masses  no umbilical or inginual hernias are present  no hepato-splenomegaly   Pelvis: Clinical staff was present for exam  External genitalia:  condyloma present Linear area of either skin tags or condyloma on the left lower vulva perineal area on the left side greater than the right side.  These are raised somewhat flattened and not typical condyloma but also little thicker than I would expect skin tag to be.  Treated with bichloracetic acid covered with Vaseline;  :  urethral meatus normal;  Vaginal:  normal pink mucosa without prolapse or lesions. GC/Chlamydia/trichomoniasis culture done vaginal cuff  Uterus:  absent.  Adnexa:  normal bimanual exam of the adnexa.        Assessment   1. Premenopausal post-hysterectomy examination  2. 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  3. STD exposure; follow-up vaginal cultures will do lab work as well as hepatitis HIV and RPR.  I would ask her to continue with counseling and maybe consistent condom use for a while she'll feel more comfortable.  She has the [employer] [place] george and I have asked her to review that next week for these results     Plan   1. 1000 mg calcium in divided doses ; ideally in her diet  2. Regular exercise  3. Self breast awareness, mammogram protocols discussed  4.   Follow up for annual exam or sooner for any problems.  If these do not resolve may need to come in to see me for biopsy to prove whether this is condyloma or skin tag.         This note was electronically signed.    Otf Young M.D.  November 30, 2018

## 2018-12-03 ENCOUNTER — TELEPHONE (OUTPATIENT)
Dept: OBSTETRICS AND GYNECOLOGY | Facility: CLINIC | Age: 33
End: 2018-12-03

## 2018-12-03 LAB — RPR SER QL: NORMAL

## 2019-01-14 ENCOUNTER — CLINICAL SUPPORT (OUTPATIENT)
Dept: INTERNAL MEDICINE | Facility: CLINIC | Age: 34
End: 2019-01-14

## 2019-01-14 DIAGNOSIS — R00.0 INAPPROPRIATE SINUS TACHYCARDIA: Primary | ICD-10-CM

## 2019-01-14 PROCEDURE — 93000 ELECTROCARDIOGRAM COMPLETE: CPT | Performed by: INTERNAL MEDICINE

## 2019-01-14 NOTE — PROGRESS NOTES
ECG 12 Lead  Date/Time: 1/14/2019 4:04 PM  Performed by: Rahat Mercedes MD  Authorized by: Rahat Mercedes MD   Comparison: not compared with previous ECG   Rhythm: sinus rhythm  Rate: normal  Conduction: conduction normal  ST Segments: ST segments normal  T Waves: T waves normal  QRS axis: normal  Other: no other findings  Clinical impression: normal ECG

## 2019-03-06 RX ORDER — BISOPROLOL FUMARATE 10 MG/1
TABLET, FILM COATED ORAL
Qty: 90 TABLET | Refills: 3 | Status: SHIPPED | OUTPATIENT
Start: 2019-03-06 | End: 2020-03-09

## 2019-03-25 ENCOUNTER — LAB (OUTPATIENT)
Dept: LAB | Facility: HOSPITAL | Age: 34
End: 2019-03-25

## 2019-03-25 DIAGNOSIS — E83.110 HEREDITARY HEMOCHROMATOSIS (HCC): ICD-10-CM

## 2019-03-25 LAB
ALBUMIN SERPL-MCNC: 4.7 G/DL (ref 3.5–5)
ALBUMIN/GLOB SERPL: 1.6 G/DL (ref 1–2)
ALP SERPL-CCNC: 70 U/L (ref 38–126)
ALT SERPL W P-5'-P-CCNC: 46 U/L (ref 13–69)
ANION GAP SERPL CALCULATED.3IONS-SCNC: 14.5 MMOL/L (ref 10–20)
AST SERPL-CCNC: 34 U/L (ref 15–46)
BILIRUB SERPL-MCNC: 0.5 MG/DL (ref 0.2–1.3)
BUN BLD-MCNC: 13 MG/DL (ref 7–20)
BUN/CREAT SERPL: 18.6 (ref 7.1–23.5)
CALCIUM SPEC-SCNC: 10.1 MG/DL (ref 8.4–10.2)
CHLORIDE SERPL-SCNC: 102 MMOL/L (ref 98–107)
CO2 SERPL-SCNC: 27 MMOL/L (ref 26–30)
CREAT BLD-MCNC: 0.7 MG/DL (ref 0.6–1.3)
FERRITIN SERPL-MCNC: 10.8 NG/ML (ref 6.24–137)
GFR SERPL CREATININE-BSD FRML MDRD: 96 ML/MIN/1.73
GLOBULIN UR ELPH-MCNC: 2.9 GM/DL
GLUCOSE BLD-MCNC: 96 MG/DL (ref 74–98)
POTASSIUM BLD-SCNC: 4.5 MMOL/L (ref 3.5–5.1)
PROT SERPL-MCNC: 7.6 G/DL (ref 6.3–8.2)
SODIUM BLD-SCNC: 139 MMOL/L (ref 137–145)

## 2019-03-25 PROCEDURE — 80053 COMPREHEN METABOLIC PANEL: CPT

## 2019-03-25 PROCEDURE — 82728 ASSAY OF FERRITIN: CPT

## 2019-03-25 PROCEDURE — 36415 COLL VENOUS BLD VENIPUNCTURE: CPT

## 2019-04-15 ENCOUNTER — OFFICE VISIT (OUTPATIENT)
Dept: ONCOLOGY | Facility: CLINIC | Age: 34
End: 2019-04-15

## 2019-04-15 VITALS
DIASTOLIC BLOOD PRESSURE: 86 MMHG | BODY MASS INDEX: 40.3 KG/M2 | HEART RATE: 72 BPM | HEIGHT: 62 IN | SYSTOLIC BLOOD PRESSURE: 126 MMHG | TEMPERATURE: 97.8 F | WEIGHT: 219 LBS | RESPIRATION RATE: 17 BRPM

## 2019-04-15 DIAGNOSIS — E83.110 HEREDITARY HEMOCHROMATOSIS (HCC): Primary | ICD-10-CM

## 2019-04-15 PROCEDURE — 99213 OFFICE O/P EST LOW 20 MIN: CPT | Performed by: NURSE PRACTITIONER

## 2019-04-15 RX ORDER — PHENOL 1.4 %
1 AEROSOL, SPRAY (ML) MUCOUS MEMBRANE NIGHTLY PRN
COMMUNITY
Start: 2018-12-21

## 2019-04-15 NOTE — PROGRESS NOTES
"CHIEF COMPLAINT: Management of hemochromatosis.    Problem List:  Oncology/Hematology History    1. 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  No other significant upper abdominal pathology. Liver otherwise appears unremarkable.   2. History of hysterectomy.   3. History of neurocardiogenic syncope status post cardiac ablation by Dr. Gentile.   4.  Passage of kidney stone with retrieval July 2016  5.  Diastolic hypertension  6.  Degenerative arthritis felt to be related to hemochromatosis according to pathology        Hemochromatosis    5/1/2007 Initial Diagnosis     Hemochromatosis         9/27/2017 Imaging     MRI liver iron quantitation  Impression:     Moderate-to-severe iron deposition in the liver measuring  280 umol per gram with 300 umol per gram defining \"major iron overload\".  In May 2016 the measurement was 270 umol per gram.                 10/17/2017 Imaging     CT of the abdomen and pelvis with contrast: Changes of mild hepato-steatosis.  Rim-enhancing lesion within the right ovary possibly representing a resolving cyst.            HISTORY OF PRESENT ILLNESS:  The patient is a 33 y.o. female, here for follow up on management of hemochromatosis.  Presently, her ferritin is 10.8 with a normal CMP and CBC.  Still has a malar rash and arthritis.  She saw rheumatology but rash was not present. She has had a hysterectomy and has no blood loss.       Past Medical History:   Diagnosis Date   • Cardiac abnormality    • Depression 12/16/2016   • Fibromyalgia 12/16/2016    probable   • Hemochromatosis    • Hypertension 12/16/2016   • Inappropriate sinus tachycardia 12/16/2016    Electrophysiology study with radiofrequency ablation, June 2011 at [facility] in [place], [place], no data.   Echocardiogram February 2016: EF 60% to 65%, " impaired relaxation noted. Trace MR, trace TR.    • Kidney stone    • Migraines    • Monospot test positive     History of positive Monospot.     • Neurocardiogenic syncope     with tachycardia. Pt reports she had cardiac ablation in 2011.     Past Surgical History:   Procedure Laterality Date   • CARDIAC ABLATION     • CERVICAL BIOPSY  W/ LOOP ELECTRODE EXCISION     • CYSTOSCOPY LITHOLAPAXY BLADDER STONE EXTRACTION     • CYSTOSCOPY URETEROSCOPY Left 7/10/2016    Procedure: CYSTOSCOPY, LEFT URETEROSCOPY, STONE EXTRACTION, LEFT URETERAL STENT INSERTION UNDER FLUROSCOPY;  Surgeon: Bernardo Simental MD;  Location: [facility];  Service:    • DILATATION AND CURETTAGE     • HYSTERECTOMY         No Known Allergies    Family History and Social History reviewed and changed as necessary      REVIEW OF SYSTEM:   Review of Systems   Constitutional: Negative for appetite change, chills, diaphoresis, fatigue, fever and unexpected weight change.   HENT:   Negative for mouth sores, sore throat and trouble swallowing.    Eyes: Negative for icterus.   Respiratory: Negative for cough, hemoptysis and shortness of breath.    Cardiovascular: Negative for chest pain, leg swelling and palpitations.   Gastrointestinal: Negative for abdominal distention, abdominal pain, blood in stool, constipation, diarrhea, nausea and vomiting.   Endocrine: Negative for hot flashes.   Genitourinary: Negative for bladder incontinence, difficulty urinating, dysuria, frequency and hematuria.    Musculoskeletal: Negative for gait problem, neck pain and neck stiffness.   Skin: Negative for rash.   Neurological: Negative for dizziness, gait problem, headaches, light-headedness and numbness.   Hematological: Negative for adenopathy. Does not bruise/bleed easily.   Psychiatric/Behavioral: Negative for depression. The patient is not nervous/anxious.    All other systems reviewed and are negative.       PHYSICAL EXAM    Vitals:    04/15/19 1125   BP: 126/86   Pulse:  "72   Resp: 17   Temp: 97.8 °F (36.6 °C)   TempSrc: Temporal   Weight: 99.3 kg (219 lb)   Height: 157.5 cm (62\")     Constitutional: Appears well-developed and well-nourished. No distress.   ECOG: (0) Fully active, able to carry on all predisease performance without restriction  HENT:   Head: Normocephalic.  Malar rash unchanged  Mouth/Throat: Oropharynx is clear and moist.   Eyes: Conjunctivae are normal. Pupils are equal, round, and reactive to light. No scleral icterus.   Neck: Neck supple. No JVD present. No thyromegaly present.   Cardiovascular: Normal rate, regular rhythm and normal heart sounds.    Pulmonary/Chest: Breath sounds normal. No respiratory distress.   Abdominal: Soft. Exhibits no distension and no mass. There is no hepatosplenomegaly. There is no tenderness. There is no rebound and no guarding.   Musculoskeletal:Exhibits no edema, tenderness or deformity.   Neurological: Alert and oriented to person, place, and time. Exhibits normal muscle tone.   Skin: No ecchymosis, no petechiae and no rash noted. Not diaphoretic. No cyanosis. Nails show no clubbing.   Psychiatric: Normal mood and affect.   Vitals reviewed.      No radiology results for the last 7 days       ASSESSMENT & PLAN:    1.  Compound heterozygous hemochromatosis  2. Vasovagal syncope  3. Diastolic hypertension  4. MAlar rash with arthritis    Discussion: She has not had a phlebotomy since November 2017.  We will continue to hold phlebotomies until her ferritin level is above 50.  Normally below 100 is our target, however, rheumatology believes her hemachromatosis is the culprit for her joint aches.  We will see her back in 6 months with blood counts and iron indices and CMP prior to return.  She will try to follow up with rheumatology when rash is present. I suggested she try to see them in the next few weeks.         Flaquita Anderson, APRN  04/15/2018      I spent a total of  15 minutes in direct patient care, greater than     minutes 10 " minutes were spent in coordination of care, and counseling the patient regarding hemachromatosis.  I answered any questions patient had with medication and plan.

## 2019-05-13 PROBLEM — E55.9 VITAMIN D DEFICIENCY: Status: ACTIVE | Noted: 2019-05-13

## 2019-05-13 PROBLEM — E78.5 HYPERLIPIDEMIA: Status: ACTIVE | Noted: 2019-05-13

## 2019-05-13 PROBLEM — M54.2 NECK PAIN: Status: ACTIVE | Noted: 2019-05-13

## 2019-05-13 PROBLEM — R07.9 CHEST PAIN: Status: ACTIVE | Noted: 2019-05-13

## 2019-05-13 PROBLEM — K58.9 IRRITABLE BOWEL SYNDROME: Status: ACTIVE | Noted: 2019-05-13

## 2019-05-13 PROBLEM — E66.01 MORBID OBESITY (HCC): Status: ACTIVE | Noted: 2019-05-13

## 2019-05-13 PROBLEM — Z00.00 ANNUAL PHYSICAL EXAM: Status: ACTIVE | Noted: 2019-05-13

## 2019-05-13 PROBLEM — R10.84 GENERALIZED ABDOMINAL PAIN: Status: ACTIVE | Noted: 2019-05-13

## 2019-05-13 PROBLEM — E83.19 OTHER DISORDERS OF IRON METABOLISM: Status: ACTIVE | Noted: 2019-05-13

## 2019-05-13 PROBLEM — R55 SYNCOPE AND COLLAPSE: Status: ACTIVE | Noted: 2019-05-13

## 2019-05-13 PROBLEM — K64.4 EXTERNAL HEMORRHOIDS: Status: ACTIVE | Noted: 2019-05-13

## 2019-05-13 PROBLEM — I48.91 ATRIAL FIBRILLATION (HCC): Status: ACTIVE | Noted: 2019-05-13

## 2019-05-13 PROBLEM — F33.1 MODERATE RECURRENT MAJOR DEPRESSION: Status: ACTIVE | Noted: 2019-05-13

## 2019-05-22 RX ORDER — DESVENLAFAXINE 100 MG/1
TABLET, EXTENDED RELEASE ORAL
Qty: 30 TABLET | Refills: 1 | Status: SHIPPED | OUTPATIENT
Start: 2019-05-22 | End: 2019-07-01 | Stop reason: SDUPTHER

## 2019-06-03 NOTE — PROGRESS NOTES
[facility] - Office Note  Cristina Martínez         [address] [address]  1985   [phone] (home)      LOCATION:  [place] office.  Visit Type: Follow Up.    PCP:  Rahat Mercedes MD    06/06/19   Cristina Martínez is a 33 y.o.  female  on disability.      Chief Complaint:   FU on HTN, ST, Vasovagal Syncope    PROBLEM LIST/PMHx:   1. Vasovagal syncope:   a. Echocardiogram, 04/12/2007, showing trace MR, TR, EF 60%.  b. Echocardiogram, March 2008: Normal LV size and function.   c. Echocardiogram, November 2013: EF 60% to 65%, trace MR.   2. Inappropriate sinus tachycardia:   a. Electrophysiology study with radiofrequency ablation, June 2011 at [facility] in [place], [place], no data.   b. Echocardiogram February 2016: EF 60% to 65%, impaired relaxation noted. Trace MR, trace TR.  3. Essential Hypertension.   4. Sinus tachycardia.   5. Migraine headaches.   6. Probable fibromyalgia.   7. Hereditary hemochromatosis, status post liver biopsy.   8. History of positive Monospot.   9. Depression.   10. Status post hysterectomy, August 2013.    No Known Allergies      Current Outpatient Medications:   •  bisoprolol (ZEBeta) 10 MG tablet, TAKE 1 TABLET ONCE DAILY, Disp: 90 tablet, Rfl: 3  •  desvenlafaxine (PRISTIQ) 100 MG 24 hr tablet, TAKE 1 TABLET EVERY DAY, Disp: 30 tablet, Rfl: 1  •  Melatonin 10 MG tablet, Take 1 tablet by mouth At Night As Needed., Disp: , Rfl:   •  Multiple Vitamin (MULTI-VITAMIN DAILY PO), Take 1 tablet by mouth Daily As Needed., Disp: , Rfl:   •  omeprazole (priLOSEC) 20 MG capsule, Take 1 capsule by mouth Daily., Disp: 30 capsule, Rfl: 2    HPI  Cristina Martínez is here today for routine follow-up on history of controlled hypertension, sinus tachycardia, and vasovagal syncope.  When she was here the last time, her diastolic blood pressures were noted to be elevated and she was concerned about this.   "Hydration, weight loss, aerobic activity were encouraged.  She does believe her readings have improved and are back to baseline.  She is under a lot of emotional stress right now going through a divorce after being  10 years.  This is caused some exacerbation of her dizzy spells and lack of sleep.  She is also noticed a 10 pound weight loss over the last 4 to 6 weeks.  She is asking for refills on medications.        The following portions of the patient's history were reviewed in the chart and updated as appropriate: allergies, current medications, past family history, past medical history, past social history, past surgical history and problem list.    Review of Systems   Constitution: Positive for weight loss. Negative for weakness and malaise/fatigue.   Cardiovascular: Positive for palpitations. Negative for chest pain, dyspnea on exertion, irregular heartbeat, near-syncope and orthopnea.   Respiratory: Negative for cough and wheezing.    Neurological: Positive for dizziness.   All other systems reviewed and are negative.            height is 157.5 cm (62\") and weight is 96.1 kg (211 lb 12.8 oz). Her blood pressure is 106/68 and her pulse is 83. Her oxygen saturation is 97%.   Physical Exam   Constitutional: Vital signs are normal. She appears well-developed and well-nourished.   Cardiovascular: Normal rate, regular rhythm, S1 normal, S2 normal, normal heart sounds, intact distal pulses and normal pulses.   Pulmonary/Chest: Effort normal and breath sounds normal. She has no wheezes. She has no rhonchi. She has no rales.   Abdominal: Soft. Normal appearance and bowel sounds are normal. There is no hepatosplenomegaly.   Neurological: She is alert.         Procedures     Assessment/ Plan   Vasovagal syncope: Well-controlled.  Continue to monitor and hydration was encouraged particularly in the next few summer months.  Reminder about appropriate postural changes was also made.  Return to clinic 1 year with " EKG or sooner as needed.    Inappropriate sinus tachycardia: Well-controlled on the Bystolic.  Continue with medications and provide refills.    Essential hypertension: Well-controlled.  Continue with medications.  Diastolic reading today was 68, WNL.        Ila Vergara PA-C functioning independently.  6/6/2019 2:27 PM

## 2019-06-06 ENCOUNTER — OFFICE VISIT (OUTPATIENT)
Dept: CARDIOLOGY | Facility: CLINIC | Age: 34
End: 2019-06-06

## 2019-06-06 VITALS
HEIGHT: 62 IN | WEIGHT: 211.8 LBS | OXYGEN SATURATION: 97 % | SYSTOLIC BLOOD PRESSURE: 106 MMHG | DIASTOLIC BLOOD PRESSURE: 68 MMHG | HEART RATE: 83 BPM | BODY MASS INDEX: 38.98 KG/M2

## 2019-06-06 DIAGNOSIS — I10 ESSENTIAL HYPERTENSION: ICD-10-CM

## 2019-06-06 DIAGNOSIS — R55 VASOVAGAL SYNCOPE: Primary | ICD-10-CM

## 2019-06-06 DIAGNOSIS — R00.0 INAPPROPRIATE SINUS TACHYCARDIA: ICD-10-CM

## 2019-06-06 PROCEDURE — 99214 OFFICE O/P EST MOD 30 MIN: CPT | Performed by: PHYSICIAN ASSISTANT

## 2019-07-01 ENCOUNTER — LAB (OUTPATIENT)
Dept: LAB | Facility: HOSPITAL | Age: 34
End: 2019-07-01

## 2019-07-01 ENCOUNTER — OFFICE VISIT (OUTPATIENT)
Dept: INTERNAL MEDICINE | Facility: CLINIC | Age: 34
End: 2019-07-01

## 2019-07-01 VITALS
DIASTOLIC BLOOD PRESSURE: 72 MMHG | WEIGHT: 208 LBS | SYSTOLIC BLOOD PRESSURE: 106 MMHG | HEIGHT: 62 IN | BODY MASS INDEX: 38.28 KG/M2 | HEART RATE: 72 BPM

## 2019-07-01 DIAGNOSIS — E78.2 MIXED HYPERLIPIDEMIA: ICD-10-CM

## 2019-07-01 DIAGNOSIS — E55.9 VITAMIN D DEFICIENCY: ICD-10-CM

## 2019-07-01 DIAGNOSIS — I10 ESSENTIAL HYPERTENSION: ICD-10-CM

## 2019-07-01 DIAGNOSIS — Z00.00 PREVENTATIVE HEALTH CARE: Primary | ICD-10-CM

## 2019-07-01 DIAGNOSIS — R73.9 HYPERGLYCEMIA: ICD-10-CM

## 2019-07-01 DIAGNOSIS — F33.1 MODERATE RECURRENT MAJOR DEPRESSION (HCC): ICD-10-CM

## 2019-07-01 DIAGNOSIS — E66.01 MORBID OBESITY (HCC): ICD-10-CM

## 2019-07-01 LAB
25(OH)D3 SERPL-MCNC: 29.8 NG/ML (ref 30–100)
ALBUMIN SERPL-MCNC: 4.3 G/DL (ref 3.5–5.2)
ALBUMIN UR-MCNC: <1.2 MG/L
ALBUMIN/GLOB SERPL: 1.7 G/DL
ALP SERPL-CCNC: 55 U/L (ref 39–117)
ALT SERPL W P-5'-P-CCNC: 33 U/L (ref 1–33)
ANION GAP SERPL CALCULATED.3IONS-SCNC: 11.5 MMOL/L (ref 5–15)
AST SERPL-CCNC: 19 U/L (ref 1–32)
BASOPHILS # BLD AUTO: 0.06 10*3/MM3 (ref 0–0.2)
BASOPHILS NFR BLD AUTO: 0.7 % (ref 0–1.5)
BILIRUB SERPL-MCNC: 0.2 MG/DL (ref 0.2–1.2)
BUN BLD-MCNC: 11 MG/DL (ref 6–20)
BUN/CREAT SERPL: 16.9 (ref 7–25)
CALCIUM SPEC-SCNC: 9.5 MG/DL (ref 8.6–10.5)
CHLORIDE SERPL-SCNC: 103 MMOL/L (ref 98–107)
CHOLEST SERPL-MCNC: 182 MG/DL (ref 0–200)
CO2 SERPL-SCNC: 24.5 MMOL/L (ref 22–29)
CREAT BLD-MCNC: 0.65 MG/DL (ref 0.57–1)
CREAT UR-MCNC: 134.2 MG/DL
DEPRECATED RDW RBC AUTO: 46.7 FL (ref 37–54)
EOSINOPHIL # BLD AUTO: 0.15 10*3/MM3 (ref 0–0.4)
EOSINOPHIL NFR BLD AUTO: 1.8 % (ref 0.3–6.2)
ERYTHROCYTE [DISTWIDTH] IN BLOOD BY AUTOMATED COUNT: 14.3 % (ref 12.3–15.4)
GFR SERPL CREATININE-BSD FRML MDRD: 104 ML/MIN/1.73
GLOBULIN UR ELPH-MCNC: 2.6 GM/DL
GLUCOSE BLD-MCNC: 107 MG/DL (ref 65–99)
HBA1C MFR BLD: 5.7 % (ref 4.8–5.6)
HCT VFR BLD AUTO: 45.7 % (ref 34–46.6)
HDLC SERPL-MCNC: 47 MG/DL (ref 40–60)
HGB BLD-MCNC: 14.2 G/DL (ref 12–15.9)
IMM GRANULOCYTES # BLD AUTO: 0.02 10*3/MM3 (ref 0–0.05)
IMM GRANULOCYTES NFR BLD AUTO: 0.2 % (ref 0–0.5)
LDLC SERPL CALC-MCNC: 96 MG/DL (ref 0–100)
LDLC/HDLC SERPL: 2.04 {RATIO}
LYMPHOCYTES # BLD AUTO: 3.73 10*3/MM3 (ref 0.7–3.1)
LYMPHOCYTES NFR BLD AUTO: 43.9 % (ref 19.6–45.3)
MCH RBC QN AUTO: 28 PG (ref 26.6–33)
MCHC RBC AUTO-ENTMCNC: 31.1 G/DL (ref 31.5–35.7)
MCV RBC AUTO: 90.1 FL (ref 79–97)
MICROALBUMIN/CREAT UR: NORMAL MG/G
MONOCYTES # BLD AUTO: 0.74 10*3/MM3 (ref 0.1–0.9)
MONOCYTES NFR BLD AUTO: 8.7 % (ref 5–12)
NEUTROPHILS # BLD AUTO: 3.79 10*3/MM3 (ref 1.7–7)
NEUTROPHILS NFR BLD AUTO: 44.7 % (ref 42.7–76)
NRBC BLD AUTO-RTO: 0 /100 WBC (ref 0–0.2)
PLATELET # BLD AUTO: 226 10*3/MM3 (ref 140–450)
PMV BLD AUTO: 11.5 FL (ref 6–12)
POTASSIUM BLD-SCNC: 4.4 MMOL/L (ref 3.5–5.2)
PROT SERPL-MCNC: 6.9 G/DL (ref 6–8.5)
RBC # BLD AUTO: 5.07 10*6/MM3 (ref 3.77–5.28)
SODIUM BLD-SCNC: 139 MMOL/L (ref 136–145)
TRIGL SERPL-MCNC: 196 MG/DL (ref 0–150)
VLDLC SERPL-MCNC: 39.2 MG/DL (ref 5–40)
WBC NRBC COR # BLD: 8.49 10*3/MM3 (ref 3.4–10.8)

## 2019-07-01 PROCEDURE — 80053 COMPREHEN METABOLIC PANEL: CPT

## 2019-07-01 PROCEDURE — 83036 HEMOGLOBIN GLYCOSYLATED A1C: CPT

## 2019-07-01 PROCEDURE — 82306 VITAMIN D 25 HYDROXY: CPT

## 2019-07-01 PROCEDURE — 82570 ASSAY OF URINE CREATININE: CPT

## 2019-07-01 PROCEDURE — 99395 PREV VISIT EST AGE 18-39: CPT | Performed by: INTERNAL MEDICINE

## 2019-07-01 PROCEDURE — 80061 LIPID PANEL: CPT

## 2019-07-01 PROCEDURE — 82043 UR ALBUMIN QUANTITATIVE: CPT

## 2019-07-01 PROCEDURE — 85025 COMPLETE CBC W/AUTO DIFF WBC: CPT

## 2019-07-01 RX ORDER — DESVENLAFAXINE 100 MG/1
100 TABLET, EXTENDED RELEASE ORAL DAILY
Qty: 30 TABLET | Refills: 11 | Status: SHIPPED | OUTPATIENT
Start: 2019-07-01 | End: 2020-07-15

## 2019-07-01 NOTE — PROGRESS NOTES
[facility]     [facility] [place]ore[place] Martínez  1985   [phone]      Patient Care Team:  Rahat Mercedes MD as PCP - General  Rahat Mercedes MD as PCP - Family Medicine  Yonas Pavon MD as Consulting Physician (Hematology and Oncology)  Jared Spence MD as Consulting Physician (Gastroenterology)  Dashawn Gentile MD as Consulting Physician (Cardiology)    Chief Complaint::   Chief Complaint   Patient presents with   • Annual Exam        HPI  Ms. Martínez is now 33.  She comes in for follow-up of her hyperlipidemia, hypertension, obesity, vitamin D deficiency, hyperglycemia, depression and for annual examination.  She has been under tremendous stress.  Her  recently left her and has now living with someone she works with.  She was given complete custody of their daughter however.  This happened in May and for a few weeks she was despondent, but has sought the help of the therapist and is doing much better.  She continues to work hard on walking regularly and reducing calories, and continues to lose weight.  At this point she feels she is doing well emotionally.  Her daughter is also in play therapy.  She sees Dr. Gentile for her neurocardiogenic syncope, hematology for her hemochromatosis, and her gynecologist regularly.    Chronic Conditions:      Patient Active Problem List   Diagnosis   • Urolithiasis   • Hypotension   • Hyperglycemia   • Hemochromatosis   • Vasovagal syncope   • Inappropriate sinus tachycardia   • Hypertension   • History of migraine headaches   • Fibromyalgia   • Depression   • Tachycardia   • S/P hysterectomy 2013   • Family history of colon cancer paternal grandmother   • H/O cardiac radiofrequency ablation - 2011    • Annual physical exam   • Atrial fibrillation (CMS/HCC)   • Chest pain   • Other disorders of iron metabolism   • External hemorrhoids   • Generalized abdominal pain   • Hyperlipidemia   • Irritable bowel syndrome   • Moderate recurrent  major depression (CMS/HCC)   • Morbid obesity (CMS/HCC)   • Syncope and collapse   • Neck pain   • Vitamin D deficiency        Past Medical History:   Diagnosis Date   • Abnormal uterine bleeding (AUB)    • Atrial fibrillation (CMS/HCC)    • Calcium deposits of brain     on MRI   • Cardiac abnormality    • Depression 12/16/2016   • Dysplasia of cervix    • Fibromyalgia 12/16/2016    probable   • Gestational diabetes     Class A1-Diet Controlled   • Hemochromatosis    • Hypertension 12/16/2016   • Inappropriate sinus tachycardia 12/16/2016    Electrophysiology study with radiofrequency ablation, June 2011 at [facility] in [place], [place], no data.   Echocardiogram February 2016: EF 60% to 65%, impaired relaxation noted. Trace MR, trace TR.    • Kidney stone    • Migraines    • Monospot test positive     History of positive Monospot.     • Nephrolithiasis    • Neurocardiogenic syncope     with tachycardia. Pt reports she had cardiac ablation in 2011.   • Steatohepatitis        Past Surgical History:   Procedure Laterality Date   • CARDIAC ABLATION  2011    bypass tract ablation   • CERVICAL BIOPSY  W/ LOOP ELECTRODE EXCISION     • CYSTOSCOPY LITHOLAPAXY BLADDER STONE EXTRACTION     • CYSTOSCOPY URETEROSCOPY Left 7/10/2016    Procedure: CYSTOSCOPY, LEFT URETEROSCOPY, STONE EXTRACTION, LEFT URETERAL STENT INSERTION UNDER FLUROSCOPY;  Surgeon: Bernardo Simental MD;  Location: [facility] OR;  Service:    • DILATATION AND CURETTAGE  2006   • HYSTERECTOMY  2013    laparoscopic hysterectomy, ovaries not removed       Family History   Problem Relation Age of Onset   • Colon cancer Paternal Grandmother    • Heart disease Mother    • Depression Mother    • Migraines Mother    • Hypertension Father    • Diabetes Father    • Hemochromatosis Father    • Hyperlipidemia Father    • Bradycardia Brother        Social History     Socioeconomic History   • Marital status:      Spouse name: Not on file   • Number of  "children: Not on file   • Years of education: Not on file   • Highest education level: Not on file   Tobacco Use   • Smoking status: Never Smoker   • Smokeless tobacco: Never Used   Substance and Sexual Activity   • Alcohol use: No   • Drug use: No   • Sexual activity: Yes     Partners: Male     Birth control/protection: Surgical       No Known Allergies      Current Outpatient Medications:   •  bisoprolol (ZEBeta) 10 MG tablet, TAKE 1 TABLET ONCE DAILY, Disp: 90 tablet, Rfl: 3  •  desvenlafaxine (PRISTIQ) 100 MG 24 hr tablet, Take 1 tablet by mouth Daily., Disp: 30 tablet, Rfl: 11  •  Melatonin 10 MG tablet, Take 1 tablet by mouth At Night As Needed., Disp: , Rfl:   •  Multiple Vitamin (MULTI-VITAMIN DAILY PO), Take 1 tablet by mouth Daily As Needed., Disp: , Rfl:   •  omeprazole (priLOSEC) 20 MG capsule, Take 1 capsule by mouth Daily., Disp: 30 capsule, Rfl: 2    Immunization History   Administered Date(s) Administered   • INFLUENZA SPLIT TRI 09/05/2013   • Tdap 09/05/2013        Health Maintenance Due   Topic Date Due   • PAP SMEAR  09/28/2017   • ANNUAL PHYSICAL  05/01/2019        Review of Systems   Constitutional: Negative for chills, fatigue and fever.   HENT: Negative for congestion, ear pain and sinus pressure.    Respiratory: Negative for cough, chest tightness, shortness of breath and wheezing.    Cardiovascular: Negative for chest pain and palpitations.   Gastrointestinal: Negative for abdominal pain, blood in stool and constipation.   Skin: Negative for color change.   Allergic/Immunologic: Negative for environmental allergies.   Neurological: Negative for dizziness, speech difficulty and headache.   Psychiatric/Behavioral: Negative for decreased concentration. The patient is not nervous/anxious.         Vital Signs  Vitals:    07/01/19 0905   BP: 106/72   BP Location: Left arm   Patient Position: Sitting   Cuff Size: Adult   Pulse: 72   Weight: 94.3 kg (208 lb)   Height: 157.5 cm (62.01\") "       Physical Exam   Constitutional: She is oriented to person, place, and time. She appears well-developed and well-nourished. She is obese.  HENT:   Head: Normocephalic and atraumatic.   Right Ear: External ear normal.   Left Ear: External ear normal.   Nose: Nose normal.   Mouth/Throat: Oropharynx is clear and moist. No oropharyngeal exudate.   Eyes: Conjunctivae and EOM are normal. Pupils are equal, round, and reactive to light.   Neck: Normal range of motion. Neck supple. No JVD present. No thyromegaly present.   Cardiovascular: Normal rate, regular rhythm, normal heart sounds and intact distal pulses. Exam reveals no gallop and no friction rub.   No murmur heard.  Pulmonary/Chest: Effort normal and breath sounds normal. No respiratory distress. She has no wheezes. She has no rales. She exhibits no tenderness.   Abdominal: Soft. Bowel sounds are normal. She exhibits no distension and no mass. There is no tenderness. There is no rebound and no guarding. No hernia.   Musculoskeletal: Normal range of motion. She exhibits no edema or tenderness.   Lymphadenopathy:     She has no cervical adenopathy.   Neurological: She is alert and oriented to person, place, and time. She displays normal reflexes. No cranial nerve deficit or sensory deficit. She exhibits normal muscle tone. Coordination normal.   Skin: Skin is warm and dry. No rash noted. No erythema.   Psychiatric: She has a normal mood and affect. Her behavior is normal. Judgment and thought content normal.   Nursing note and vitals reviewed.     Procedures    ACE III MINI             Assessment/Plan:    Cristina was seen today for annual exam.    Diagnoses and all orders for this visit:    Preventative health care    Mixed hyperlipidemia  -     Lipid Panel; Future    Morbid obesity (CMS/HCC)    Vitamin D deficiency  -     Vitamin D 25 Hydroxy; Future    Essential hypertension  -     CBC & Differential; Future  -     Comprehensive Metabolic Panel; Future  -      Microalbumin / Creatinine Urine Ratio - Urine, Clean Catch; Future    Hyperglycemia  -     Hemoglobin A1c; Future    Moderate recurrent major depression (CMS/HCC)    Other orders  -     desvenlafaxine (PRISTIQ) 100 MG 24 hr tablet; Take 1 tablet by mouth Daily.    Plan    Bite significant stress, she is continued working on losing weight and improving fitness.  He sees her gynecologist.    Lipid panel is pending, treatment remains healthy diet and exercise.    BMI is 38, still meeting criteria for morbid obesity, but she has lost over 15 pounds in the last year.  I am very impressed with her efforts particularly given the social circumstances.  Encouraged to keep doing what she is doing.    Blood pressure is controlled on bisoprolol.    A1c is pending, treatment remains weight loss.    Depression is well compensated despite significant stress with the help of her medication and with her therapist.  I expect that the end of the bad marriage will ultimately be beneficial.  Continue that has been loxapine.        Labs  Results for orders placed or performed in visit on 03/25/19   Ferritin   Result Value Ref Range    Ferritin 10.80 6.24 - 137.00 ng/mL   Comprehensive Metabolic Panel   Result Value Ref Range    Glucose 96 74 - 98 mg/dL    BUN 13 7 - 20 mg/dL    Creatinine 0.70 0.60 - 1.30 mg/dL    Sodium 139 137 - 145 mmol/L    Potassium 4.5 3.5 - 5.1 mmol/L    Chloride 102 98 - 107 mmol/L    CO2 27.0 26.0 - 30.0 mmol/L    Calcium 10.1 8.4 - 10.2 mg/dL    Total Protein 7.6 6.3 - 8.2 g/dL    Albumin 4.70 3.50 - 5.00 g/dL    ALT (SGPT) 46 13 - 69 U/L    AST (SGOT) 34 15 - 46 U/L    Alkaline Phosphatase 70 38 - 126 U/L    Total Bilirubin 0.5 0.2 - 1.3 mg/dL    eGFR Non African Amer 96 >60 mL/min/1.73    Globulin 2.9 gm/dL    A/G Ratio 1.6 1.0 - 2.0 g/dL    BUN/Creatinine Ratio 18.6 7.1 - 23.5    Anion Gap 14.5 10.0 - 20.0 mmol/L        Counseling was given to patient for the following topics: appropriate exercise daily,  disease prevention and healthy eating habits.    Plan of care reviewed with patient at the conclusion of today's visit. Education was provided regarding diagnosis, management, and any prescribed or recommended OTC medications.Patient verbalizes understanding of and agreement with management plan.         Rahat Mercedes MD

## 2019-09-24 RX ORDER — BUPROPION HYDROCHLORIDE 150 MG/1
150 TABLET ORAL DAILY
Qty: 30 TABLET | Refills: 2 | Status: SHIPPED | OUTPATIENT
Start: 2019-09-24 | End: 2020-01-06

## 2019-11-21 ENCOUNTER — OFFICE VISIT (OUTPATIENT)
Dept: ONCOLOGY | Facility: CLINIC | Age: 34
End: 2019-11-21

## 2019-11-21 VITALS
HEART RATE: 70 BPM | DIASTOLIC BLOOD PRESSURE: 86 MMHG | SYSTOLIC BLOOD PRESSURE: 123 MMHG | WEIGHT: 196 LBS | OXYGEN SATURATION: 98 % | BODY MASS INDEX: 36.07 KG/M2 | HEIGHT: 62 IN | TEMPERATURE: 96.6 F

## 2019-11-21 DIAGNOSIS — E83.110 HEREDITARY HEMOCHROMATOSIS (HCC): Primary | ICD-10-CM

## 2019-11-21 PROCEDURE — 99213 OFFICE O/P EST LOW 20 MIN: CPT | Performed by: NURSE PRACTITIONER

## 2019-11-21 NOTE — PROGRESS NOTES
"CHIEF COMPLAINT: Management of hemochromatosis.    Problem List:  Oncology/Hematology History    1. 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  No other significant upper abdominal pathology. Liver otherwise appears unremarkable.   2. History of hysterectomy.   3. History of neurocardiogenic syncope status post cardiac ablation by Dr. Gentile.   4.  Passage of kidney stone with retrieval July 2016  5.  Diastolic hypertension  6.  Degenerative arthritis felt to be related to hemochromatosis according to pathology        Hemochromatosis    5/1/2007 Initial Diagnosis     Hemochromatosis         9/27/2017 Imaging     MRI liver iron quantitation  Impression:     Moderate-to-severe iron deposition in the liver measuring  280 umol per gram with 300 umol per gram defining \"major iron overload\".  In May 2016 the measurement was 270 umol per gram.                 10/17/2017 Imaging     CT of the abdomen and pelvis with contrast: Changes of mild hepato-steatosis.  Rim-enhancing lesion within the right ovary possibly representing a resolving cyst.            HISTORY OF PRESENT ILLNESS:  The patient is a 33 y.o. female, here for follow up on management of hemochromatosis.  She has not had recent labs. Her rash has resolved. Migraines are better. She reports that she recently got a divorce and her health has improved. She has intentionally lost about 30lbs she says. She is focusing on her health and feels much better overall. She has had a hysterectomy so no blood loss.           Past Medical History:   Diagnosis Date   • Abnormal uterine bleeding (AUB)    • Atrial fibrillation (CMS/HCC)    • Calcium deposits of brain     on MRI   • Cardiac abnormality    • Depression 12/16/2016   • Dysplasia of cervix    • Fibromyalgia 12/16/2016    probable   • Gestational " diabetes     Class A1-Diet Controlled   • Hemochromatosis    • Hypertension 12/16/2016   • Inappropriate sinus tachycardia 12/16/2016    Electrophysiology study with radiofrequency ablation, June 2011 at [facility] in [place], [place], no data.   Echocardiogram February 2016: EF 60% to 65%, impaired relaxation noted. Trace MR, trace TR.    • Kidney stone    • Migraines    • Monospot test positive     History of positive Monospot.     • Nephrolithiasis    • Neurocardiogenic syncope     with tachycardia. Pt reports she had cardiac ablation in 2011.   • Steatohepatitis      Past Surgical History:   Procedure Laterality Date   • CARDIAC ABLATION  2011    bypass tract ablation   • CERVICAL BIOPSY  W/ LOOP ELECTRODE EXCISION     • CYSTOSCOPY LITHOLAPAXY BLADDER STONE EXTRACTION     • CYSTOSCOPY URETEROSCOPY Left 7/10/2016    Procedure: CYSTOSCOPY, LEFT URETEROSCOPY, STONE EXTRACTION, LEFT URETERAL STENT INSERTION UNDER FLUROSCOPY;  Surgeon: Bernardo Simental MD;  Location: [facility];  Service:    • DILATATION AND CURETTAGE  2006   • HYSTERECTOMY  2013    laparoscopic hysterectomy, ovaries not removed       No Known Allergies    Family History and Social History reviewed and changed as necessary      REVIEW OF SYSTEM:   Review of Systems   Constitutional: Negative for appetite change, chills, diaphoresis, fatigue, fever and unexpected weight change.   HENT:   Negative for mouth sores, sore throat and trouble swallowing.    Eyes: Negative for icterus.   Respiratory: Negative for cough, hemoptysis and shortness of breath.    Cardiovascular: Negative for chest pain, leg swelling and palpitations.   Gastrointestinal: Negative for abdominal distention, abdominal pain, blood in stool, constipation, diarrhea, nausea and vomiting.   Endocrine: Negative for hot flashes.   Genitourinary: Negative for bladder incontinence, difficulty urinating, dysuria, frequency and hematuria.    Musculoskeletal: Negative for gait  "problem, neck pain and neck stiffness.   Skin: Negative for rash.   Neurological: Negative for dizziness, gait problem, headaches, light-headedness and numbness.   Hematological: Negative for adenopathy. Does not bruise/bleed easily.   Psychiatric/Behavioral: Negative for depression. The patient is not nervous/anxious.    All other systems reviewed and are negative.       PHYSICAL EXAM    Vitals:    11/21/19 0924   BP: 123/86   Pulse: 70   Temp: 96.6 °F (35.9 °C)   SpO2: 98%   Weight: 88.9 kg (196 lb)   Height: 157.5 cm (62\")     Constitutional: Appears well-developed and well-nourished. No distress.   ECOG: (0) Fully active, able to carry on all predisease performance without restriction  HENT:   Head: Normocephalic.  Malar rash unchanged  Mouth/Throat: Oropharynx is clear and moist.   Eyes: Conjunctivae are normal. Pupils are equal, round, and reactive to light. No scleral icterus.   Neck: Neck supple. No JVD present. No thyromegaly present.   Cardiovascular: Normal rate, regular rhythm and normal heart sounds.    Pulmonary/Chest: Breath sounds normal. No respiratory distress.   Abdominal: Soft. Exhibits no distension and no mass. There is no hepatosplenomegaly. There is no tenderness. There is no rebound and no guarding.   Musculoskeletal:Exhibits no edema, tenderness or deformity.   Neurological: Alert and oriented to person, place, and time. Exhibits normal muscle tone.   Skin: No ecchymosis, no petechiae and no rash noted. Not diaphoretic. No cyanosis. Nails show no clubbing.   Psychiatric: Normal mood and affect.   Vitals reviewed.      No radiology results for the last 7 days       ASSESSMENT & PLAN:    1.  Compound heterozygous hemochromatosis  2. Vasovagal syncope  3. Diastolic hypertension  4. Malar rash with arthritis    Discussion: She has not had a phlebotomy since November 2017.  I will check labs today. We will continue to hold phlebotomies until her ferritin level is above 50.  Normally below 100 is " our target, however, rheumatology believes her hemachromatosis is the culprit for her joint aches.  We will see her back in 6 months with blood counts and iron indices and CMP prior to return.     She will continue to follow up with rheumatology prn.         Flaquita Anderson, APRN  11/21/2019         I spent a total of  15 minutes in direct patient care, greater than  10   minutes (greater than 50%) were spent in coordination of care, and counseling the patient regarding Compound heterozygous hemochromatosis  .  I answered any questions patient had with medication and plan.

## 2019-12-07 ENCOUNTER — LAB (OUTPATIENT)
Dept: LAB | Facility: HOSPITAL | Age: 34
End: 2019-12-07

## 2019-12-07 DIAGNOSIS — E83.110 HEREDITARY HEMOCHROMATOSIS (HCC): ICD-10-CM

## 2019-12-07 LAB
ALBUMIN SERPL-MCNC: 4 G/DL (ref 3.5–5.2)
ALBUMIN/GLOB SERPL: 1.3 G/DL
ALP SERPL-CCNC: 58 U/L (ref 39–117)
ALT SERPL W P-5'-P-CCNC: 26 U/L (ref 1–33)
ANION GAP SERPL CALCULATED.3IONS-SCNC: 12.6 MMOL/L (ref 5–15)
AST SERPL-CCNC: 24 U/L (ref 1–32)
BASOPHILS # BLD AUTO: 0.04 10*3/MM3 (ref 0–0.2)
BASOPHILS NFR BLD AUTO: 0.6 % (ref 0–1.5)
BILIRUB SERPL-MCNC: 0.2 MG/DL (ref 0.2–1.2)
BUN BLD-MCNC: 19 MG/DL (ref 6–20)
BUN/CREAT SERPL: 27.9 (ref 7–25)
CALCIUM SPEC-SCNC: 8.9 MG/DL (ref 8.6–10.5)
CHLORIDE SERPL-SCNC: 102 MMOL/L (ref 98–107)
CO2 SERPL-SCNC: 24.4 MMOL/L (ref 22–29)
CREAT BLD-MCNC: 0.68 MG/DL (ref 0.57–1)
DEPRECATED RDW RBC AUTO: 42 FL (ref 37–54)
EOSINOPHIL # BLD AUTO: 0.12 10*3/MM3 (ref 0–0.4)
EOSINOPHIL NFR BLD AUTO: 1.7 % (ref 0.3–6.2)
ERYTHROCYTE [DISTWIDTH] IN BLOOD BY AUTOMATED COUNT: 12.7 % (ref 12.3–15.4)
FERRITIN SERPL-MCNC: 32.78 NG/ML (ref 13–150)
GFR SERPL CREATININE-BSD FRML MDRD: 98 ML/MIN/1.73
GLOBULIN UR ELPH-MCNC: 3.2 GM/DL
GLUCOSE BLD-MCNC: 100 MG/DL (ref 65–99)
HCT VFR BLD AUTO: 43.4 % (ref 34–46.6)
HGB BLD-MCNC: 14.1 G/DL (ref 12–15.9)
IMM GRANULOCYTES # BLD AUTO: 0.01 10*3/MM3 (ref 0–0.05)
IMM GRANULOCYTES NFR BLD AUTO: 0.1 % (ref 0–0.5)
IRON 24H UR-MRATE: 54 MCG/DL (ref 37–145)
IRON SATN MFR SERPL: 14 % (ref 20–50)
LYMPHOCYTES # BLD AUTO: 3.42 10*3/MM3 (ref 0.7–3.1)
LYMPHOCYTES NFR BLD AUTO: 47.5 % (ref 19.6–45.3)
MCH RBC QN AUTO: 29.5 PG (ref 26.6–33)
MCHC RBC AUTO-ENTMCNC: 32.5 G/DL (ref 31.5–35.7)
MCV RBC AUTO: 90.8 FL (ref 79–97)
MONOCYTES # BLD AUTO: 0.51 10*3/MM3 (ref 0.1–0.9)
MONOCYTES NFR BLD AUTO: 7.1 % (ref 5–12)
NEUTROPHILS # BLD AUTO: 3.1 10*3/MM3 (ref 1.7–7)
NEUTROPHILS NFR BLD AUTO: 43 % (ref 42.7–76)
NRBC BLD AUTO-RTO: 0 /100 WBC (ref 0–0.2)
PLATELET # BLD AUTO: 189 10*3/MM3 (ref 140–450)
PMV BLD AUTO: 9.9 FL (ref 6–12)
POTASSIUM BLD-SCNC: 4.1 MMOL/L (ref 3.5–5.2)
PROT SERPL-MCNC: 7.2 G/DL (ref 6–8.5)
RBC # BLD AUTO: 4.78 10*6/MM3 (ref 3.77–5.28)
SODIUM BLD-SCNC: 139 MMOL/L (ref 136–145)
TIBC SERPL-MCNC: 396 MCG/DL (ref 298–536)
TRANSFERRIN SERPL-MCNC: 266 MG/DL (ref 200–360)
WBC NRBC COR # BLD: 7.2 10*3/MM3 (ref 3.4–10.8)

## 2019-12-07 PROCEDURE — 82728 ASSAY OF FERRITIN: CPT | Performed by: NURSE PRACTITIONER

## 2019-12-07 PROCEDURE — 84466 ASSAY OF TRANSFERRIN: CPT | Performed by: NURSE PRACTITIONER

## 2019-12-07 PROCEDURE — 83540 ASSAY OF IRON: CPT | Performed by: NURSE PRACTITIONER

## 2019-12-07 PROCEDURE — 85025 COMPLETE CBC W/AUTO DIFF WBC: CPT | Performed by: NURSE PRACTITIONER

## 2019-12-07 PROCEDURE — 36415 COLL VENOUS BLD VENIPUNCTURE: CPT | Performed by: NURSE PRACTITIONER

## 2019-12-07 PROCEDURE — 80053 COMPREHEN METABOLIC PANEL: CPT | Performed by: NURSE PRACTITIONER

## 2019-12-09 ENCOUNTER — TELEPHONE (OUTPATIENT)
Dept: ONCOLOGY | Facility: CLINIC | Age: 34
End: 2019-12-09

## 2019-12-09 NOTE — TELEPHONE ENCOUNTER
Discussed with patient that labs are stable. Ferritin is still below 50. We will continue to hold phlebotomies for now. Keep scheduled follow up visit.

## 2019-12-27 DIAGNOSIS — K21.9 GASTROESOPHAGEAL REFLUX DISEASE, ESOPHAGITIS PRESENCE NOT SPECIFIED: ICD-10-CM

## 2019-12-27 RX ORDER — OMEPRAZOLE 20 MG/1
CAPSULE, DELAYED RELEASE ORAL
Qty: 30 CAPSULE | Refills: 11 | OUTPATIENT
Start: 2019-12-27

## 2020-01-06 ENCOUNTER — LAB (OUTPATIENT)
Dept: LAB | Facility: HOSPITAL | Age: 35
End: 2020-01-06

## 2020-01-06 ENCOUNTER — OFFICE VISIT (OUTPATIENT)
Dept: INTERNAL MEDICINE | Facility: CLINIC | Age: 35
End: 2020-01-06

## 2020-01-06 VITALS
SYSTOLIC BLOOD PRESSURE: 98 MMHG | HEIGHT: 62 IN | DIASTOLIC BLOOD PRESSURE: 70 MMHG | BODY MASS INDEX: 36.44 KG/M2 | WEIGHT: 198 LBS | HEART RATE: 72 BPM

## 2020-01-06 DIAGNOSIS — E78.2 MIXED HYPERLIPIDEMIA: Primary | ICD-10-CM

## 2020-01-06 DIAGNOSIS — R73.03 PREDIABETES: ICD-10-CM

## 2020-01-06 DIAGNOSIS — E55.9 VITAMIN D DEFICIENCY: ICD-10-CM

## 2020-01-06 DIAGNOSIS — E78.2 MIXED HYPERLIPIDEMIA: ICD-10-CM

## 2020-01-06 DIAGNOSIS — F33.1 MODERATE RECURRENT MAJOR DEPRESSION (HCC): ICD-10-CM

## 2020-01-06 DIAGNOSIS — E66.01 MORBID OBESITY (HCC): ICD-10-CM

## 2020-01-06 DIAGNOSIS — I10 ESSENTIAL HYPERTENSION: ICD-10-CM

## 2020-01-06 DIAGNOSIS — K21.9 GASTROESOPHAGEAL REFLUX DISEASE, ESOPHAGITIS PRESENCE NOT SPECIFIED: ICD-10-CM

## 2020-01-06 LAB
25(OH)D3 SERPL-MCNC: 29.3 NG/ML (ref 30–100)
CHOLEST SERPL-MCNC: 217 MG/DL (ref 0–200)
HBA1C MFR BLD: 5.56 % (ref 4.8–5.6)
HDLC SERPL-MCNC: 59 MG/DL (ref 40–60)
LDLC SERPL CALC-MCNC: 141 MG/DL (ref 0–100)
LDLC/HDLC SERPL: 2.39 {RATIO}
TRIGL SERPL-MCNC: 84 MG/DL (ref 0–150)
VLDLC SERPL-MCNC: 16.8 MG/DL (ref 5–40)

## 2020-01-06 PROCEDURE — 82306 VITAMIN D 25 HYDROXY: CPT

## 2020-01-06 PROCEDURE — 80061 LIPID PANEL: CPT

## 2020-01-06 PROCEDURE — 99214 OFFICE O/P EST MOD 30 MIN: CPT | Performed by: INTERNAL MEDICINE

## 2020-01-06 PROCEDURE — 83036 HEMOGLOBIN GLYCOSYLATED A1C: CPT

## 2020-01-06 RX ORDER — OMEPRAZOLE 20 MG/1
20 CAPSULE, DELAYED RELEASE ORAL DAILY
Qty: 30 CAPSULE | Refills: 5 | Status: SHIPPED | OUTPATIENT
Start: 2020-01-06 | End: 2020-08-13

## 2020-01-06 NOTE — PROGRESS NOTES
[facility]     Cristina Martínez  1985   [id number]      Patient Care Team:  Rahat Mercedes MD as PCP - General  Rahat Mercedes MD as PCP - Family Medicine  Yonas Pavon MD as Consulting Physician (Hematology and Oncology)  Jared Spence MD as Consulting Physician (Gastroenterology)  Dashawn Gentile MD as Consulting Physician (Cardiology)    Chief Complaint::   Chief Complaint   Patient presents with   • Hyperlipidemia   • Hypertension   • Hyperglycemia        HPI  Cristina comes in for follow-up of her hypertension, hyperlipidemia, obesity, vitamin D deficiency hyperglycemia and depression.  She is doing well.  She and her daughter are both seeing a  therapist.  Did not start bupropion.  At this point she does not think she needs it.  Has maintained weight loss.  She saw hematology last month for follow-up of her hemochromatosis, and sees cardiology annually for her vasovagal syncope.  She has had no syncopal episodes, lightheadedness or palpitations.    Chronic Conditions:      Patient Active Problem List   Diagnosis   • Urolithiasis   • Hypotension   • Hyperglycemia   • Hemochromatosis   • Vasovagal syncope   • Inappropriate sinus tachycardia   • Hypertension   • History of migraine headaches   • Fibromyalgia   • Depression   • Tachycardia   • S/P hysterectomy 2013   • Family history of colon cancer paternal grandmother   • H/O cardiac radiofrequency ablation - 2011    • Annual physical exam   • Atrial fibrillation (CMS/HCC)   • Chest pain   • Other disorders of iron metabolism   • External hemorrhoids   • Generalized abdominal pain   • Hyperlipidemia   • Irritable bowel syndrome   • Moderate recurrent major depression (CMS/HCC)   • Morbid obesity (CMS/HCC)   • Syncope and collapse   • Neck pain   • Vitamin D deficiency        Past Medical History:   Diagnosis Date   • Abnormal uterine bleeding (AUB)    • Atrial fibrillation (CMS/HCC)    • Calcium deposits of brain      on MRI   • Cardiac abnormality    • Depression 12/16/2016   • Dysplasia of cervix    • Fibromyalgia 12/16/2016    probable   • Gestational diabetes     Class A1-Diet Controlled   • Hemochromatosis    • Hypertension 12/16/2016   • Inappropriate sinus tachycardia 12/16/2016    Electrophysiology study with radiofrequency ablation, June 2011 at [facility] in [place], [place], no data.   Echocardiogram February 2016: EF 60% to 65%, impaired relaxation noted. Trace MR, trace TR.    • Kidney stone    • Migraines    • Monospot test positive     History of positive Monospot.     • Nephrolithiasis    • Neurocardiogenic syncope     with tachycardia. Pt reports she had cardiac ablation in 2011.   • Steatohepatitis        Past Surgical History:   Procedure Laterality Date   • CARDIAC ABLATION  2011    bypass tract ablation   • CERVICAL BIOPSY  W/ LOOP ELECTRODE EXCISION     • CYSTOSCOPY LITHOLAPAXY BLADDER STONE EXTRACTION     • CYSTOSCOPY URETEROSCOPY Left 7/10/2016    Procedure: CYSTOSCOPY, LEFT URETEROSCOPY, STONE EXTRACTION, LEFT URETERAL STENT INSERTION UNDER FLUROSCOPY;  Surgeon: Bernardo Simental MD;  Location: [facility];  Service:    • DILATATION AND CURETTAGE  2006   • HYSTERECTOMY  2013    laparoscopic hysterectomy, ovaries not removed       Family History   Problem Relation Age of Onset   • Colon cancer Paternal Grandmother    • Heart disease Mother    • Depression Mother    • Migraines Mother    • Hypertension Father    • Diabetes Father    • Hemochromatosis Father    • Hyperlipidemia Father    • Bradycardia Brother        Social History     Socioeconomic History   • Marital status:      Spouse name: Not on file   • Number of children: Not on file   • Years of education: Not on file   • Highest education level: Not on file   Tobacco Use   • Smoking status: Never Smoker   • Smokeless tobacco: Never Used   Substance and Sexual Activity   • Alcohol use: No   • Drug use: No   • Sexual activity: Yes  "    Partners: Male     Birth control/protection: Surgical       No Known Allergies      Current Outpatient Medications:   •  bisoprolol (ZEBeta) 10 MG tablet, TAKE 1 TABLET ONCE DAILY, Disp: 90 tablet, Rfl: 3  •  desvenlafaxine (PRISTIQ) 100 MG 24 hr tablet, Take 1 tablet by mouth Daily., Disp: 30 tablet, Rfl: 11  •  Melatonin 10 MG tablet, Take 1 tablet by mouth At Night As Needed., Disp: , Rfl:   •  Multiple Vitamin (MULTI-VITAMIN DAILY PO), Take 1 tablet by mouth Daily As Needed., Disp: , Rfl:   •  omeprazole (priLOSEC) 20 MG capsule, Take 1 capsule by mouth Daily., Disp: 30 capsule, Rfl: 5    Review of Systems   Constitutional: Negative for chills, fatigue and fever.   HENT: Negative for congestion, ear pain and sinus pressure.    Respiratory: Negative for cough, chest tightness, shortness of breath and wheezing.    Cardiovascular: Negative for chest pain and palpitations.   Gastrointestinal: Negative for abdominal pain, blood in stool and constipation.   Skin: Negative for color change.   Allergic/Immunologic: Negative for environmental allergies.   Neurological: Negative for dizziness, speech difficulty and headache.   Psychiatric/Behavioral: Negative for decreased concentration. The patient is not nervous/anxious.         Vital Signs  Vitals:    01/06/20 0822   BP: 98/70   BP Location: Right arm   Patient Position: Sitting   Cuff Size: Adult   Pulse: 72   Weight: 89.8 kg (198 lb)   Height: 157.5 cm (62.01\")   PainSc: 0-No pain       Physical Exam   Constitutional: She is oriented to person, place, and time. She appears well-developed and well-nourished. She appears overweight.   HENT:   Head: Normocephalic and atraumatic.   Cardiovascular: Normal rate, regular rhythm and normal heart sounds.   No murmur heard.  Pulmonary/Chest: Effort normal and breath sounds normal.   Neurological: She is alert and oriented to person, place, and time.   Psychiatric: She has a normal mood and affect.   Vitals reviewed.   "   Procedures    ACE III MINI             Assessment/Plan:    Cristina was seen today for hyperlipidemia, hypertension and hyperglycemia.    Diagnoses and all orders for this visit:    Mixed hyperlipidemia  -     Lipid Panel; Future    Prediabetes  -     Hemoglobin A1c; Future    Vitamin D deficiency  -     Vitamin D 25 Hydroxy; Future    Morbid obesity (CMS/HCC)    Moderate recurrent major depression (CMS/[facility])    Essential hypertension    Plan    Lipid panel is pending, she will continue diet and exercise.    A1c is pending, fasting glucose last month was 100.  Treatment remains diet and exercise.    Vitamin D level is pending, she will continue taking over-the-counter vitamin D supplement.    BMI today is 36.  She currently is at a plateau but has lost a total of about 30 pounds over the past year or so.  I applauded her for maintaining weight loss and encouraged her to continue working on it.    Emotionally she is doing well on Pristiq and with the help of her therapist.    Blood pressure remains well controlled with bisoprolol.      Plan of care reviewed with patient at the conclusion of today's visit. Education was provided regarding diagnosis, management, and any prescribed or recommended OTC medications.Patient verbalizes understanding of and agreement with management plan.         Rahat Mercedes MD

## 2020-01-17 ENCOUNTER — OFFICE VISIT (OUTPATIENT)
Dept: OBSTETRICS AND GYNECOLOGY | Facility: CLINIC | Age: 35
End: 2020-01-17

## 2020-01-17 VITALS
DIASTOLIC BLOOD PRESSURE: 70 MMHG | SYSTOLIC BLOOD PRESSURE: 118 MMHG | WEIGHT: 200 LBS | HEIGHT: 62 IN | BODY MASS INDEX: 36.8 KG/M2

## 2020-01-17 DIAGNOSIS — Z01.419 ENCOUNTER FOR GYNECOLOGICAL EXAMINATION WITHOUT ABNORMAL FINDING: Primary | ICD-10-CM

## 2020-01-17 PROCEDURE — 99395 PREV VISIT EST AGE 18-39: CPT | Performed by: OBSTETRICS & GYNECOLOGY

## 2020-01-17 NOTE — PROGRESS NOTES
Subjective   Chief Complaint   Patient presents with   • Annual Exam     Cristina Martínez is a 34 y.o. year old  who is S/P hysterectomy presenting to be seen for her annual exam.   . Ex doesn't see daughter he ended up marrying her best friend who got  because her  left her for a male.  We reviewed her old STD screening all was negative.  Had a question regarding some skin tags noted 2018 they have not changed may be a little smaller first noted these when she was pregnant.  New partner x 4 months she does not want to have any testing done.    SEXUAL Hx:  She is currently sexually active.   She does nothave pain or problems with sex  Concerns about domestic violence:no  HEALTH Hx:  Level of weekly physical activity:2 hours lost 22 #   She wears her seat belt: yes  Self breast awareness: yes  Caffeine intake : 1 coffee cup equivalents  Calcium intake: 1servings per day  Social History    Tobacco Use      Smoking status: Never Smoker      Smokeless tobacco: Never Used    Social History     Substance and Sexual Activity   Alcohol Use No             The following portions of the patient's history were reviewed and updated as appropriate:problem list, current medications, allergies, past family history, past medical history, past social history and past surgical history.    Current Outpatient Medications:   •  bisoprolol (ZEBeta) 10 MG tablet, TAKE 1 TABLET ONCE DAILY, Disp: 90 tablet, Rfl: 3  •  desvenlafaxine (PRISTIQ) 100 MG 24 hr tablet, Take 1 tablet by mouth Daily., Disp: 30 tablet, Rfl: 11  •  Melatonin 10 MG tablet, Take 1 tablet by mouth At Night As Needed., Disp: , Rfl:   •  Multiple Vitamin (MULTI-VITAMIN DAILY PO), Take 1 tablet by mouth Daily As Needed., Disp: , Rfl:   •  omeprazole (priLOSEC) 20 MG capsule, Take 1 capsule by mouth Daily., Disp: 30 capsule, Rfl: 5    Review of Systems  Constitutional   POS: weight loss    NEG: anorexia, malaise or night sweats  "  Gastointestinal POS: nothing reported    NEG: bloating, change in bowel habits, melena or reflux symptoms   Genitourinary POS: nothing reported    NEG:dysuria or hematuria   Breast                POS: nothing reported    NEG: persistent breast lump, skin dimpling or nipple discharge                Objective   /70   Ht 156.2 cm (61.5\")   Wt 90.7 kg (200 lb)   LMP  (LMP Unknown)   Breastfeeding No   BMI 37.18 kg/m²     General:  well developed; well nourished  no acute distress  appears stated age   Skin:  No suspicious lesions seen   Thyroid: not examined   Breasts:  Examined in supine position  Symmetric without masses or skin dimpling  Nipples normal without inversion, lesions or discharge  Fibrocystic changes are present both breasts without a discrete mass   Abdomen: soft, non-tender; no masses  no umbilical or inguinal hernias are present  no hepato-splenomegaly   Pelvis: Clinical staff was present for exam  External genitalia:  normal appearance of the external genitalia including Bartholin's and [place]'s glands. There are a few skin tags on the left lateral perineum these do not appear to be warts.  :  urethral meatus normal;  Vaginal:  normal pink mucosa without prolapse or lesions.  Uterus:  absent.  Adnexa:  non palpable bilaterally.       Lab Review   CBC, CMP, LIPIDS, PATHOLOGY   and Vitamin D STD screening November 2018  Imaging Review   CT of abdomen/pelvis report       Assessment   1.  Normal premenopausal post hysterectomy examination.  Apparent skin tags these do not appear to be condyloma.  Good weight loss        Plan     1. 1000 mg calcium in divided doses ; ideally in her diet  2. Regular exercise encouraged continued weight loss  3. Self breast awareness, mammogram protocols discussed  4.   Follow up for annual exam or sooner for any problems.      No orders of the defined types were placed in this encounter.    No orders of the defined types were placed in this encounter.       "     This note was electronically signed.    Otf Young M.D.  January 17, 2020

## 2020-03-09 RX ORDER — BISOPROLOL FUMARATE 10 MG/1
TABLET, FILM COATED ORAL
Qty: 90 TABLET | Refills: 1 | Status: SHIPPED | OUTPATIENT
Start: 2020-03-09 | End: 2020-04-07 | Stop reason: SDUPTHER

## 2020-03-13 ENCOUNTER — OFFICE VISIT (OUTPATIENT)
Dept: INTERNAL MEDICINE | Facility: CLINIC | Age: 35
End: 2020-03-13

## 2020-03-13 VITALS — DIASTOLIC BLOOD PRESSURE: 80 MMHG | SYSTOLIC BLOOD PRESSURE: 115 MMHG | TEMPERATURE: 97.3 F

## 2020-03-13 DIAGNOSIS — J06.9 UPPER RESPIRATORY TRACT INFECTION, UNSPECIFIED TYPE: Primary | ICD-10-CM

## 2020-03-13 LAB
EXPIRATION DATE: NORMAL
EXPIRATION DATE: NORMAL
FLUAV RNA RESP QL NAA+PROBE: NEGATIVE
FLUBV RNA RESP QL NAA+PROBE: NEGATIVE
INTERNAL CONTROL: NORMAL
INTERNAL CONTROL: NORMAL
Lab: NORMAL
Lab: NORMAL
S PYO RRNA THROAT QL PROBE: NEGATIVE

## 2020-03-13 PROCEDURE — 87651 STREP A DNA AMP PROBE: CPT | Performed by: INTERNAL MEDICINE

## 2020-03-13 PROCEDURE — 87502 INFLUENZA DNA AMP PROBE: CPT | Performed by: INTERNAL MEDICINE

## 2020-03-13 PROCEDURE — 99213 OFFICE O/P EST LOW 20 MIN: CPT | Performed by: INTERNAL MEDICINE

## 2020-03-13 RX ORDER — AMOXICILLIN 500 MG/1
500 CAPSULE ORAL 2 TIMES DAILY
Qty: 14 CAPSULE | Refills: 0 | Status: SHIPPED | OUTPATIENT
Start: 2020-03-13 | End: 2020-03-31

## 2020-03-13 NOTE — PROGRESS NOTES
[facility]     Mickiemichael Martínez  1985   [id number]      Patient Care Team:  Rahat Mercedes MD as PCP - General  Rahat Mercedes MD as PCP - Family Medicine  Yonas Pavon MD as Consulting Physician (Hematology and Oncology)  Jared Spence MD as Consulting Physician (Gastroenterology)  Dashawn Gentile MD as Consulting Physician (Cardiology)    Chief Complaint::   Chief Complaint   Patient presents with   • Nasal Congestion        HPI  Ms. Martínez comes in with a 2 to 3-day history of nasal and sinus congestion, sore throat and low-grade fever.  She initially complained of shortness of breath, but admits this is her baseline shortness of breath with moderate dyspnea.  She has a mild cough that is nonproductive.  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    Chronic Conditions:      Patient Active Problem List   Diagnosis   • Urolithiasis   • Hyperglycemia   • Hemochromatosis   • Vasovagal syncope   • Inappropriate sinus tachycardia   • Hypertension   • History of migraine headaches   • Fibromyalgia   • Depression   • Tachycardia   • Family history of colon cancer paternal grandmother   • Annual physical exam   • Atrial fibrillation (CMS/HCC)   • Chest pain   • Other disorders of iron metabolism   • External hemorrhoids   • Generalized abdominal pain   • Hyperlipidemia   • Irritable bowel syndrome   • Moderate recurrent major depression (CMS/HCC)   • Morbid obesity (CMS/HCC)   • Syncope and collapse   • Neck pain   • Vitamin D deficiency        Past Medical History:   Diagnosis Date   • Abnormal uterine bleeding (AUB)    • Atrial fibrillation (CMS/HCC)    • Calcium deposits of brain     on MRI   • Cardiac abnormality    • Depression 12/16/2016   • Dysplasia of cervix    • Fibromyalgia 12/16/2016    probable   • Gestational diabetes     Class A1-Diet Controlled   • Hemochromatosis    • Hypertension 12/16/2016   •  Inappropriate sinus tachycardia 12/16/2016    Electrophysiology study with radiofrequency ablation, June 2011 at [facility] in [place], [place], no data.   Echocardiogram February 2016: EF 60% to 65%, impaired relaxation noted. Trace MR, trace TR.    • Kidney stone    • Migraines    • Monospot test positive     History of positive Monospot.     • Nephrolithiasis    • Neurocardiogenic syncope     with tachycardia. Pt reports she had cardiac ablation in 2011.   • Steatohepatitis        Past Surgical History:   Procedure Laterality Date   • CARDIAC ABLATION  2011    bypass tract ablation   • CERVICAL CONIZATION, LEEP  2005    MODERATE DYSPLASIA   • CYSTOSCOPY URETEROSCOPY Left 7/10/2016    Procedure: CYSTOSCOPY, LEFT URETEROSCOPY, STONE EXTRACTION, LEFT URETERAL STENT INSERTION UNDER FLUROSCOPY;  Surgeon: Bernardo Simental MD;  Location: [facility];  Service:    • DILATATION AND CURETTAGE  2010   • TOTAL LAPAROSCOPIC HYSTERECTOMY Bilateral 08/19/2013    bilat SALPINGECTOMY       Family History   Problem Relation Age of Onset   • Colon cancer Paternal Grandmother    • Heart disease Mother    • Depression Mother    • Migraines Mother    • Hypertension Father    • Diabetes Father    • Hemochromatosis Father    • Hyperlipidemia Father    • Bradycardia Brother        Social History     Socioeconomic History   • Marital status:      Spouse name: Not on file   • Number of children: Not on file   • Years of education: Not on file   • Highest education level: Not on file   Tobacco Use   • Smoking status: Never Smoker   • Smokeless tobacco: Never Used   Substance and Sexual Activity   • Alcohol use: No   • Drug use: No   • Sexual activity: Yes     Partners: Male     Birth control/protection: Surgical       No Known Allergies      Current Outpatient Medications:   •  bisoprolol (ZEBeta) 10 MG tablet, TAKE 1 TABLET BY MOUTH ONCE DAILY, Disp: 90 tablet, Rfl: 1  •  desvenlafaxine (PRISTIQ) 100 MG 24 hr tablet, Take  1 tablet by mouth Daily., Disp: 30 tablet, Rfl: 11  •  Melatonin 10 MG tablet, Take 1 tablet by mouth At Night As Needed., Disp: , Rfl:   •  Multiple Vitamin (MULTI-VITAMIN DAILY PO), Take 1 tablet by mouth Daily As Needed., Disp: , Rfl:   •  omeprazole (priLOSEC) 20 MG capsule, Take 1 capsule by mouth Daily., Disp: 30 capsule, Rfl: 5    Review of Systems   Constitutional: Positive for fatigue and fever. Negative for chills.   HENT: Positive for postnasal drip, sinus pressure and sore throat. Negative for congestion and ear pain.    Respiratory: Negative for cough, chest tightness, shortness of breath and wheezing.    Cardiovascular: Negative for chest pain and palpitations.   Gastrointestinal: Negative for abdominal pain, blood in stool and constipation.   Skin: Negative for color change.   Allergic/Immunologic: Negative for environmental allergies.   Neurological: Negative for dizziness, speech difficulty and headache.   Psychiatric/Behavioral: Negative for decreased concentration. The patient is not nervous/anxious.         Vital Signs  Vitals:    03/13/20 1530   BP: 115/80   Temp: 97.3 °F (36.3 °C)       Physical Exam   Constitutional: She is oriented to person, place, and time. She appears well-developed and well-nourished.   HENT:   Head: Normocephalic and atraumatic.   Right Ear: Tympanic membrane and ear canal normal.   Left Ear: Tympanic membrane and ear canal normal.   Mouth/Throat: Posterior oropharyngeal erythema present. No oropharyngeal exudate.   Cardiovascular: Normal rate, regular rhythm and normal heart sounds.   No murmur heard.  Pulmonary/Chest: Effort normal and breath sounds normal.   Neurological: She is alert and oriented to person, place, and time.   Psychiatric: She has a normal mood and affect.   Vitals reviewed.     Procedures    ACE III MINI             Assessment/Plan:    Cristina was seen today for nasal congestion.    Diagnoses and all orders for this visit:    Upper respiratory tract  infection, unspecified type  -     POCT Flu A&B, Molecular  -     POCT Strep A, molecular    Plan    This is a common upper respiratory infection, most likely viral.  Covid 19 screening is not indicated.  Because of her mildly immunocompromised state, she is given amoxicillin but told only to take it if she worsens or if she is not better in 48 hours.    Plan of care reviewed with patient at the conclusion of today's visit. Education was provided regarding diagnosis, management, and any prescribed or recommended OTC medications.Patient verbalizes understanding of and agreement with management plan.         Rahat Mercedes MD

## 2020-03-19 ENCOUNTER — TELEPHONE (OUTPATIENT)
Dept: INTERNAL MEDICINE | Facility: CLINIC | Age: 35
End: 2020-03-19

## 2020-03-31 ENCOUNTER — OFFICE VISIT (OUTPATIENT)
Dept: INTERNAL MEDICINE | Facility: CLINIC | Age: 35
End: 2020-03-31

## 2020-03-31 ENCOUNTER — TELEPHONE (OUTPATIENT)
Dept: INTERNAL MEDICINE | Facility: CLINIC | Age: 35
End: 2020-03-31

## 2020-03-31 DIAGNOSIS — I10 ESSENTIAL HYPERTENSION: ICD-10-CM

## 2020-03-31 DIAGNOSIS — I48.0 PAROXYSMAL ATRIAL FIBRILLATION (HCC): ICD-10-CM

## 2020-03-31 DIAGNOSIS — R00.0 TACHYCARDIA: Primary | ICD-10-CM

## 2020-03-31 PROCEDURE — 99213 OFFICE O/P EST LOW 20 MIN: CPT | Performed by: INTERNAL MEDICINE

## 2020-03-31 NOTE — PROGRESS NOTES
[facility]     Cristina Martínez  1985   [id number]      Patient Care Team:  Rahat Mercedes MD as PCP - General  Rahat Mercedes MD as PCP - Family Medicine  Yonas Pavon MD as Consulting Physician (Hematology and Oncology)  Jared Spence MD as Consulting Physician (Gastroenterology)  Dashawn Gentile MD as Consulting Physician (Cardiology)    Chief Complaint::   Chief Complaint   Patient presents with   • Dizziness   • Rapid Heart Rate        IZABELA Boudreaux was seen today via tele-visit.  For the past 2 months she has been working out at a local gym.  2 weeks ago she developed a sinus infection and was treated and ever since she has had tachycardia with minimal activity.  At rest she is comfortable and her blood pressure is been roughly 120/99.  However minimal exertion such as walking through the house causes fluttering and palpitations.  She is dizzy when she lies down.  She states this reminds her of the symptoms she had prior to her ablation in 2011.  She has no chest pain or dyspnea and there is been no syncope.  You have chosen to receive care through a telephone visit today. Do you consent to use a telephone visit for your medical care today? Yes    Chronic Conditions:      Patient Active Problem List   Diagnosis   • Urolithiasis   • Hyperglycemia   • Hemochromatosis   • Vasovagal syncope   • Inappropriate sinus tachycardia   • Hypertension   • History of migraine headaches   • Fibromyalgia   • Depression   • Tachycardia   • Family history of colon cancer paternal grandmother   • Annual physical exam   • Atrial fibrillation (CMS/HCC)   • Chest pain   • Other disorders of iron metabolism   • External hemorrhoids   • Generalized abdominal pain   • Hyperlipidemia   • Irritable bowel syndrome   • Moderate recurrent major depression (CMS/HCC)   • Morbid obesity (CMS/HCC)   • Syncope and collapse   • Neck pain   • Vitamin D deficiency        Past Medical History:    Diagnosis Date   • Abnormal uterine bleeding (AUB)    • Atrial fibrillation (CMS/HCC)    • Calcium deposits of brain     on MRI   • Cardiac abnormality    • Depression 12/16/2016   • Dysplasia of cervix    • Fibromyalgia 12/16/2016    probable   • Gestational diabetes     Class A1-Diet Controlled   • Hemochromatosis    • Hypertension 12/16/2016   • Inappropriate sinus tachycardia 12/16/2016    Electrophysiology study with radiofrequency ablation, June 2011 at [facility] in [place], [place], no data.   Echocardiogram February 2016: EF 60% to 65%, impaired relaxation noted. Trace MR, trace TR.    • Kidney stone    • Migraines    • Monospot test positive     History of positive Monospot.     • Nephrolithiasis    • Neurocardiogenic syncope     with tachycardia. Pt reports she had cardiac ablation in 2011.   • Steatohepatitis        Past Surgical History:   Procedure Laterality Date   • CARDIAC ABLATION  2011    bypass tract ablation   • CERVICAL CONIZATION, LEEP  2005    MODERATE DYSPLASIA   • CYSTOSCOPY URETEROSCOPY Left 7/10/2016    Procedure: CYSTOSCOPY, LEFT URETEROSCOPY, STONE EXTRACTION, LEFT URETERAL STENT INSERTION UNDER FLUROSCOPY;  Surgeon: Bernardo Simental MD;  Location: [facility];  Service:    • DILATATION AND CURETTAGE  2010   • TOTAL LAPAROSCOPIC HYSTERECTOMY Bilateral 08/19/2013    bilat SALPINGECTOMY       Family History   Problem Relation Age of Onset   • Colon cancer Paternal Grandmother    • Heart disease Mother    • Depression Mother    • Migraines Mother    • Hypertension Father    • Diabetes Father    • Hemochromatosis Father    • Hyperlipidemia Father    • Bradycardia Brother        Social History     Socioeconomic History   • Marital status:      Spouse name: Not on file   • Number of children: Not on file   • Years of education: Not on file   • Highest education level: Not on file   Tobacco Use   • Smoking status: Never Smoker   • Smokeless tobacco: Never Used   Substance  and Sexual Activity   • Alcohol use: No   • Drug use: No   • Sexual activity: Yes     Partners: Male     Birth control/protection: Surgical       No Known Allergies      Current Outpatient Medications:   •  bisoprolol (ZEBeta) 10 MG tablet, TAKE 1 TABLET BY MOUTH ONCE DAILY, Disp: 90 tablet, Rfl: 1  •  desvenlafaxine (PRISTIQ) 100 MG 24 hr tablet, Take 1 tablet by mouth Daily., Disp: 30 tablet, Rfl: 11  •  Melatonin 10 MG tablet, Take 1 tablet by mouth At Night As Needed., Disp: , Rfl:   •  Multiple Vitamin (MULTI-VITAMIN DAILY PO), Take 1 tablet by mouth Daily As Needed., Disp: , Rfl:   •  omeprazole (priLOSEC) 20 MG capsule, Take 1 capsule by mouth Daily., Disp: 30 capsule, Rfl: 5    Review of Systems   Constitutional: Negative.    HENT: Negative.    Respiratory: Negative.    Cardiovascular: Positive for palpitations.   Gastrointestinal: Negative.    Neurological: Negative.         Vital Signs  There were no vitals filed for this visit.    Physical Exam   Procedures    ACE III MINI             Assessment/Plan:    Cristina was seen today for dizziness and rapid heart rate.    Diagnoses and all orders for this visit:    Tachycardia    Paroxysmal atrial fibrillation (CMS/[facility])    Essential hypertension    Plan    Inappropriate tachycardia in a patient with a history of uric previously of paroxysmal atrial fibrillation status post ablation who has had since episodes of inappropriate tachycardia which is been well controlled on Bystolic.  I suggested that she increase her Bystolic dose from 10 mg at bedtime to 10 mg at bedtime and 5 in the morning.  In 48 hours if she is not had improvement she will let me know we will bring her in for ECG.    This visit has been rescheduled as a phone visit to comply with patient safety concerns in accordance with CDC recommendations. Total time of discussion was 10 minutes.          Plan of care reviewed with patient at the conclusion of today's visit. Education was provided regarding  diagnosis, management, and any prescribed or recommended OTC medications.Patient verbalizes understanding of and agreement with management plan.         Rahat Mercedes MD

## 2020-03-31 NOTE — TELEPHONE ENCOUNTER
Regarding: Non-Urgent Medical Question  Contact: [phone]      ----- Message -----  From: Savannah Lopez MA  Sent: 3/31/2020   7:56 AM EDT  To: Rahat Mercedes MD  Subject: Non-Urgent Medical Question                      ----- Message from Savannah Lopez MA sent at 3/31/2020  7:56 AM EDT -----       ----- Message from Cristina Martínez to Rahat Mercedes MD sent at 3/30/2020  8:15 PM -----   Dr. Mercedes,    I am having some issues again with my heart rate. A couple of months ago I joined a local gym and had been working out daily. I then got sick with the sinus issues I saw you recently for. Shortly after seeing you, my heart rate has been really high. I have caught it as high as the upper 140s. This is with no physical activity. I can walk through the house and it beats super fast and feels like it is fluttering. Often times I can hear it beating when I am up moving around. When I lie down to sleep I am very dizzy. I have also caught my bp up on one occasion where the bottom number was almost 100. It reminds me of the same exact issues I had prior to the ablation in 2011. What should I do? Thank you

## 2020-04-07 RX ORDER — BISOPROLOL FUMARATE 10 MG/1
TABLET, FILM COATED ORAL
Qty: 135 TABLET | Refills: 1 | Status: SHIPPED | OUTPATIENT
Start: 2020-04-07 | End: 2020-08-06

## 2020-05-20 DIAGNOSIS — E83.110 HEREDITARY HEMOCHROMATOSIS (HCC): Primary | ICD-10-CM

## 2020-07-07 ENCOUNTER — OFFICE VISIT (OUTPATIENT)
Dept: INTERNAL MEDICINE | Facility: CLINIC | Age: 35
End: 2020-07-07

## 2020-07-07 ENCOUNTER — LAB (OUTPATIENT)
Dept: LAB | Facility: HOSPITAL | Age: 35
End: 2020-07-07

## 2020-07-07 VITALS
HEART RATE: 72 BPM | TEMPERATURE: 98.4 F | DIASTOLIC BLOOD PRESSURE: 76 MMHG | BODY MASS INDEX: 38.06 KG/M2 | WEIGHT: 201.6 LBS | SYSTOLIC BLOOD PRESSURE: 112 MMHG | HEIGHT: 61 IN

## 2020-07-07 DIAGNOSIS — E83.19 OTHER DISORDERS OF IRON METABOLISM: ICD-10-CM

## 2020-07-07 DIAGNOSIS — F33.1 MODERATE RECURRENT MAJOR DEPRESSION (HCC): ICD-10-CM

## 2020-07-07 DIAGNOSIS — I10 ESSENTIAL HYPERTENSION: ICD-10-CM

## 2020-07-07 DIAGNOSIS — E78.2 MIXED HYPERLIPIDEMIA: ICD-10-CM

## 2020-07-07 DIAGNOSIS — L91.8 INFLAMED SKIN TAG: ICD-10-CM

## 2020-07-07 DIAGNOSIS — E66.01 MORBID OBESITY (HCC): ICD-10-CM

## 2020-07-07 DIAGNOSIS — Z00.00 ANNUAL PHYSICAL EXAM: Primary | ICD-10-CM

## 2020-07-07 DIAGNOSIS — E83.110 HEREDITARY HEMOCHROMATOSIS (HCC): ICD-10-CM

## 2020-07-07 DIAGNOSIS — R73.9 HYPERGLYCEMIA: ICD-10-CM

## 2020-07-07 DIAGNOSIS — E55.9 VITAMIN D DEFICIENCY: ICD-10-CM

## 2020-07-07 LAB
25(OH)D3 SERPL-MCNC: 26.1 NG/ML (ref 30–100)
ALBUMIN SERPL-MCNC: 4.4 G/DL (ref 3.5–5.2)
ALBUMIN UR-MCNC: <1.2 MG/DL
ALBUMIN/GLOB SERPL: 1.5 G/DL
ALP SERPL-CCNC: 54 U/L (ref 39–117)
ALT SERPL W P-5'-P-CCNC: 19 U/L (ref 1–33)
ANION GAP SERPL CALCULATED.3IONS-SCNC: 9.9 MMOL/L (ref 5–15)
AST SERPL-CCNC: 15 U/L (ref 1–32)
BASOPHILS # BLD AUTO: 0.07 10*3/MM3 (ref 0–0.2)
BASOPHILS NFR BLD AUTO: 0.7 % (ref 0–1.5)
BILIRUB SERPL-MCNC: 0.2 MG/DL (ref 0–1.2)
BUN SERPL-MCNC: 15 MG/DL (ref 6–20)
BUN/CREAT SERPL: 21.1 (ref 7–25)
CALCIUM SPEC-SCNC: 9.8 MG/DL (ref 8.6–10.5)
CHLORIDE SERPL-SCNC: 100 MMOL/L (ref 98–107)
CHOLEST SERPL-MCNC: 229 MG/DL (ref 0–200)
CO2 SERPL-SCNC: 25.1 MMOL/L (ref 22–29)
CREAT SERPL-MCNC: 0.71 MG/DL (ref 0.57–1)
CREAT UR-MCNC: 182.1 MG/DL
DEPRECATED RDW RBC AUTO: 41.8 FL (ref 37–54)
EOSINOPHIL # BLD AUTO: 0.22 10*3/MM3 (ref 0–0.4)
EOSINOPHIL NFR BLD AUTO: 2.3 % (ref 0.3–6.2)
ERYTHROCYTE [DISTWIDTH] IN BLOOD BY AUTOMATED COUNT: 13.1 % (ref 12.3–15.4)
FERRITIN SERPL-MCNC: 30.4 NG/ML (ref 13–150)
GFR SERPL CREATININE-BSD FRML MDRD: 94 ML/MIN/1.73
GLOBULIN UR ELPH-MCNC: 2.9 GM/DL
GLUCOSE SERPL-MCNC: 92 MG/DL (ref 65–99)
HBA1C MFR BLD: 5.53 % (ref 4.8–5.6)
HCT VFR BLD AUTO: 44.2 % (ref 34–46.6)
HDLC SERPL-MCNC: 58 MG/DL (ref 40–60)
HGB BLD-MCNC: 15 G/DL (ref 12–15.9)
IMM GRANULOCYTES # BLD AUTO: 0.03 10*3/MM3 (ref 0–0.05)
IMM GRANULOCYTES NFR BLD AUTO: 0.3 % (ref 0–0.5)
IRON 24H UR-MRATE: 63 MCG/DL (ref 37–145)
IRON SATN MFR SERPL: 15 % (ref 20–50)
LDLC SERPL CALC-MCNC: 144 MG/DL (ref 0–100)
LDLC/HDLC SERPL: 2.48 {RATIO}
LYMPHOCYTES # BLD AUTO: 4.54 10*3/MM3 (ref 0.7–3.1)
LYMPHOCYTES NFR BLD AUTO: 47.7 % (ref 19.6–45.3)
MCH RBC QN AUTO: 29.8 PG (ref 26.6–33)
MCHC RBC AUTO-ENTMCNC: 33.9 G/DL (ref 31.5–35.7)
MCV RBC AUTO: 87.7 FL (ref 79–97)
MICROALBUMIN/CREAT UR: NORMAL MG/G{CREAT}
MONOCYTES # BLD AUTO: 0.76 10*3/MM3 (ref 0.1–0.9)
MONOCYTES NFR BLD AUTO: 8 % (ref 5–12)
NEUTROPHILS NFR BLD AUTO: 3.89 10*3/MM3 (ref 1.7–7)
NEUTROPHILS NFR BLD AUTO: 41 % (ref 42.7–76)
NRBC BLD AUTO-RTO: 0 /100 WBC (ref 0–0.2)
PLATELET # BLD AUTO: 205 10*3/MM3 (ref 140–450)
PMV BLD AUTO: 10.3 FL (ref 6–12)
POTASSIUM SERPL-SCNC: 4.3 MMOL/L (ref 3.5–5.2)
PROT SERPL-MCNC: 7.3 G/DL (ref 6–8.5)
RBC # BLD AUTO: 5.04 10*6/MM3 (ref 3.77–5.28)
SODIUM SERPL-SCNC: 135 MMOL/L (ref 136–145)
TIBC SERPL-MCNC: 420 MCG/DL (ref 298–536)
TRANSFERRIN SERPL-MCNC: 282 MG/DL (ref 200–360)
TRIGL SERPL-MCNC: 135 MG/DL (ref 0–150)
VLDLC SERPL-MCNC: 27 MG/DL (ref 5–40)
WBC # BLD AUTO: 9.51 10*3/MM3 (ref 3.4–10.8)

## 2020-07-07 PROCEDURE — 82306 VITAMIN D 25 HYDROXY: CPT

## 2020-07-07 PROCEDURE — 82043 UR ALBUMIN QUANTITATIVE: CPT

## 2020-07-07 PROCEDURE — 99395 PREV VISIT EST AGE 18-39: CPT | Performed by: INTERNAL MEDICINE

## 2020-07-07 PROCEDURE — 83540 ASSAY OF IRON: CPT

## 2020-07-07 PROCEDURE — 80061 LIPID PANEL: CPT

## 2020-07-07 PROCEDURE — 83036 HEMOGLOBIN GLYCOSYLATED A1C: CPT

## 2020-07-07 PROCEDURE — 85025 COMPLETE CBC W/AUTO DIFF WBC: CPT

## 2020-07-07 PROCEDURE — 82570 ASSAY OF URINE CREATININE: CPT

## 2020-07-07 PROCEDURE — 80053 COMPREHEN METABOLIC PANEL: CPT

## 2020-07-07 PROCEDURE — 17110 DESTRUCTION B9 LES UP TO 14: CPT | Performed by: INTERNAL MEDICINE

## 2020-07-07 PROCEDURE — 84466 ASSAY OF TRANSFERRIN: CPT

## 2020-07-07 PROCEDURE — 82728 ASSAY OF FERRITIN: CPT

## 2020-07-07 RX ORDER — CETIRIZINE HYDROCHLORIDE 10 MG/1
10 TABLET ORAL DAILY
COMMUNITY

## 2020-07-07 NOTE — PROGRESS NOTES
[facility]     [facility] Florecita Martínez  1985   [id number]      Patient Care Team:  Rahat Mercedes MD as PCP - General  Rahat Mercedes MD as PCP - Family Medicine  Yonas Pavon MD as Consulting Physician (Hematology and Oncology)  Jared Spence MD as Consulting Physician (Gastroenterology)  Dashawn eGntile MD as Consulting Physician (Cardiology)    Chief Complaint::   Chief Complaint   Patient presents with   • Annual Exam        HPI  Ms. Martínez is now 34.  She comes in for follow-up of her hyperlipidemia, vitamin D deficiency, hyperglycemia, hypertension, morbid obesity, depression and for annual examination.  She is actually doing very well.  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  Physically she feels well.  She has avoided weight gain during the pandemic but admits that she is not been as physically active.  She is emotionally doing much better after her divorce.  There is no fever, cough, shortness of breath or chest pain.  She continues to see hematology for hemochromatosis and cardiology for vasovagal syncope.    Chronic Conditions:      Patient Active Problem List   Diagnosis   • Urolithiasis   • Hyperglycemia   • Hemochromatosis   • Vasovagal syncope   • Inappropriate sinus tachycardia   • Hypertension   • History of migraine headaches   • Fibromyalgia   • Depression   • Tachycardia   • Family history of colon cancer paternal grandmother   • Annual physical exam   • Atrial fibrillation (CMS/HCC)   • Chest pain   • Other disorders of iron metabolism   • External hemorrhoids   • Generalized abdominal pain   • Hyperlipidemia   • Irritable bowel syndrome   • Moderate recurrent major depression (CMS/HCC)   • Morbid obesity (CMS/HCC)   • Syncope and collapse   • Neck pain   • Vitamin D deficiency        Past Medical History:   Diagnosis Date   • Abnormal uterine bleeding (AUB)    • Atrial fibrillation (CMS/HCC)    • Calcium deposits of brain      on MRI   • Cardiac abnormality    • Depression 12/16/2016   • Dysplasia of cervix    • Fibromyalgia 12/16/2016    probable   • Gestational diabetes     Class A1-Diet Controlled   • Hemochromatosis    • Hypertension 12/16/2016   • Inappropriate sinus tachycardia 12/16/2016    Electrophysiology study with radiofrequency ablation, June 2011 at [facility] in [place], [place], no data.   Echocardiogram February 2016: EF 60% to 65%, impaired relaxation noted. Trace MR, trace TR.    • Kidney stone    • Migraines    • Monospot test positive     History of positive Monospot.     • Nephrolithiasis    • Neurocardiogenic syncope     with tachycardia. Pt reports she had cardiac ablation in 2011.   • Steatohepatitis        Past Surgical History:   Procedure Laterality Date   • CARDIAC ABLATION  2011    bypass tract ablation   • CERVICAL CONIZATION, LEEP  2005    MODERATE DYSPLASIA   • CYSTOSCOPY URETEROSCOPY Left 7/10/2016    Procedure: CYSTOSCOPY, LEFT URETEROSCOPY, STONE EXTRACTION, LEFT URETERAL STENT INSERTION UNDER FLUROSCOPY;  Surgeon: Bernardo Simental MD;  Location: [facility];  Service:    • DILATATION AND CURETTAGE  2010   • TOTAL LAPAROSCOPIC HYSTERECTOMY Bilateral 08/19/2013    bilat SALPINGECTOMY       Family History   Problem Relation Age of Onset   • Colon cancer Paternal Grandmother    • Heart disease Mother    • Depression Mother    • Migraines Mother    • Hypertension Father    • Diabetes Father    • Hemochromatosis Father    • Hyperlipidemia Father    • Bradycardia Brother        Social History     Socioeconomic History   • Marital status:      Spouse name: Not on file   • Number of children: Not on file   • Years of education: Not on file   • Highest education level: Not on file   Tobacco Use   • Smoking status: Never Smoker   • Smokeless tobacco: Never Used   Substance and Sexual Activity   • Alcohol use: No   • Drug use: No   • Sexual activity: Yes     Partners: Male     Birth  "control/protection: Surgical       No Known Allergies      Current Outpatient Medications:   •  bisoprolol (ZEBeta) 10 MG tablet, Take one tablet in the morning, 1/2 in the evening. (Patient taking differently: Take 10 mg by mouth Every Evening. Take one tablet in the morning, 1/2 in the evening.), Disp: 135 tablet, Rfl: 1  •  cetirizine (zyrTEC) 10 MG tablet, Take 10 mg by mouth Daily., Disp: , Rfl:   •  desvenlafaxine (PRISTIQ) 100 MG 24 hr tablet, Take 1 tablet by mouth Daily., Disp: 30 tablet, Rfl: 11  •  Melatonin 10 MG tablet, Take 1 tablet by mouth At Night As Needed., Disp: , Rfl:   •  Multiple Vitamin (MULTI-VITAMIN DAILY PO), Take 1 tablet by mouth Daily As Needed., Disp: , Rfl:   •  omeprazole (priLOSEC) 20 MG capsule, Take 1 capsule by mouth Daily., Disp: 30 capsule, Rfl: 5    Immunization History   Administered Date(s) Administered   • Flu Vaccine Quad PF >18YRS 10/18/2019   • INFLUENZA SPLIT TRI 09/05/2013   • Tdap 09/05/2013        Health Maintenance Due   Topic Date Due   • PAP SMEAR  09/28/2017   • ANNUAL PHYSICAL  07/02/2020        Review of Systems   Constitutional: Negative for chills, fatigue and fever.   HENT: Negative for congestion, ear pain and sinus pressure.    Respiratory: Negative for cough, chest tightness, shortness of breath and wheezing.    Cardiovascular: Negative for chest pain and palpitations.   Gastrointestinal: Negative for abdominal pain, blood in stool and constipation.   Skin: Negative for color change.   Allergic/Immunologic: Positive for environmental allergies.   Neurological: Negative for dizziness, speech difficulty and headache.   Psychiatric/Behavioral: Negative for decreased concentration. The patient is not nervous/anxious.         Vital Signs  Vitals:    07/07/20 0826   BP: 112/76   BP Location: Left arm   Patient Position: Sitting   Cuff Size: Adult   Pulse: 72   Temp: 98.4 °F (36.9 °C)   Weight: 91.4 kg (201 lb 9.6 oz)   Height: 156.2 cm (61.5\")   PainSc: 0-No " pain       Physical Exam   Constitutional: She is oriented to person, place, and time. She appears well-developed and well-nourished.   HENT:   Head: Normocephalic and atraumatic.   Right Ear: External ear normal.   Left Ear: External ear normal.   Nose: Nose normal.   Mouth/Throat: Oropharynx is clear and moist. No oropharyngeal exudate.   Eyes: Pupils are equal, round, and reactive to light. Conjunctivae and EOM are normal.   Neck: Normal range of motion. Neck supple. No JVD present. No thyromegaly present.   Cardiovascular: Normal rate, regular rhythm, normal heart sounds and intact distal pulses. Exam reveals no gallop and no friction rub.   No murmur heard.  Pulmonary/Chest: Effort normal and breath sounds normal. No respiratory distress. She has no wheezes. She has no rales.   Abdominal: Soft. Bowel sounds are normal. She exhibits no distension and no mass. There is no tenderness. There is no rebound and no guarding. No hernia.   Musculoskeletal: Normal range of motion. She exhibits no edema or tenderness.   Lymphadenopathy:     She has no cervical adenopathy.   Neurological: She is alert and oriented to person, place, and time. She displays normal reflexes. No cranial nerve deficit or sensory deficit. She exhibits normal muscle tone. Coordination normal.   Skin: Skin is warm and dry. No rash noted. No erythema.   There is a skin tag in the left upper back that is irritated.   Psychiatric: She has a normal mood and affect. Her behavior is normal. Judgment and thought content normal.   Nursing note and vitals reviewed.     Cryotherapy, Skin Lesion  Date/Time: 7/7/2020 9:14 AM  Performed by: Rahat Mercedes MD  Authorized by: Rahat Mercedes MD   Local anesthesia used: no    Anesthesia:  Local anesthesia used: no    Sedation:  Patient sedated: no    Patient tolerance: Patient tolerated the procedure well with no immediate complications  Comments: Irritated skin tag on left upper back is treated  with cryotherapy without problem.           Fall Risk Screen:  GRETA Fall Risk Assessment has not been completed.    Health Habits and Functional and Cognitive Screening:  No flowsheet data found.    Depression [place]ing  PHQ-9 Total Score:       ACE III MINI             Assessment/Plan:    Cristina was seen today for annual exam.    Diagnoses and all orders for this visit:    Annual physical exam    Mixed hyperlipidemia  -     Lipid Panel; Future    Hereditary hemochromatosis (CMS/HCC)    Vitamin D deficiency  -     Vitamin D 25 Hydroxy; Future    Hyperglycemia  -     Comprehensive Metabolic Panel; Future  -     Hemoglobin A1c; Future    Other disorders of iron metabolism  -     CBC & Differential; Future  -     Ferritin; Future  -     Iron Profile; Future    Essential hypertension  -     Microalbumin / Creatinine Urine Ratio - Urine, Clean Catch; Future    Morbid obesity (CMS/HCC)    Moderate recurrent major depression (CMS/HCC)    Inflamed skin tag    Other orders  -     Cryotherapy, Skin Lesion    Plan    She is actually doing quite well.  Physically and emotionally she is probably doing as well as she has in an years.  Weight is discussed below.  She is up-to-date on GYN care.    Lipid panel is pending, she will continue efforts at healthy diet and regular exercise.    She will continue follow-up with hematology for hemochromatosis.    Vitamin D level is pending, she will continue current daily supplement.    A1c is pending, we discussed the importance of weight loss to reduce her risk of diabetes as she gets older.  She has a strong family history of diabetes.    Blood pressures well controlled on bisoprolol.  She will continue follow-up with cardiology for her history of atrial fibrillation and syncope.    BMI is stable at 37.  Although I applauded her for avoiding weight gain during the pandemic, I encouraged her to once again put forth greater effort on caloric restriction and exercise to lose further  weight.    Her mood is well controlled on Pristiq.      Labs  Results for orders placed or performed in visit on 03/13/20   POCT Flu A&B, Molecular   Result Value Ref Range    POC Influenza A, Molecular Negative Negative    POC Influenza B, Molecular Negative Negative    Internal Control Passed Passed    Lot Number [id number]     Expiration Date 01/02/2021    POCT Strep A, molecular   Result Value Ref Range    POC Strep A, Molecular Negative Negative    Internal Control Passed Passed    Lot Number [id number]     Expiration Date 01/01/2021         Counseling was given to patient for the following topics: appropriate exercise 150 minutes/week, disease prevention and healthy eating habits.    Plan of care reviewed with patient at the conclusion of today's visit. Education was provided regarding diagnosis, management, and any prescribed or recommended OTC medications.Patient verbalizes understanding of and agreement with management plan.         Rahat Mercedes MD

## 2020-07-10 ENCOUNTER — OFFICE VISIT (OUTPATIENT)
Dept: ONCOLOGY | Facility: CLINIC | Age: 35
End: 2020-07-10

## 2020-07-10 VITALS
DIASTOLIC BLOOD PRESSURE: 71 MMHG | HEART RATE: 76 BPM | HEIGHT: 62 IN | RESPIRATION RATE: 12 BRPM | OXYGEN SATURATION: 97 % | BODY MASS INDEX: 37.54 KG/M2 | TEMPERATURE: 98 F | WEIGHT: 204 LBS | SYSTOLIC BLOOD PRESSURE: 103 MMHG

## 2020-07-10 DIAGNOSIS — E66.01 MORBID OBESITY (HCC): ICD-10-CM

## 2020-07-10 DIAGNOSIS — E78.00 HYPERCHOLESTEREMIA: ICD-10-CM

## 2020-07-10 DIAGNOSIS — E83.110 HEREDITARY HEMOCHROMATOSIS (HCC): Primary | ICD-10-CM

## 2020-07-10 PROCEDURE — 99213 OFFICE O/P EST LOW 20 MIN: CPT | Performed by: NURSE PRACTITIONER

## 2020-07-10 NOTE — PROGRESS NOTES
"CHIEF COMPLAINT: Management of hemochromatosis.    Problem List:  Oncology/Hematology History    1. 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  No other significant upper abdominal pathology. Liver otherwise appears unremarkable.   2. History of hysterectomy.   3. History of neurocardiogenic syncope status post cardiac ablation by Dr. Gentile.   4.  Passage of kidney stone with retrieval July 2016  5.  Diastolic hypertension  6.  Degenerative arthritis felt to be related to hemochromatosis according to pathology        Hemochromatosis    5/1/2007 Initial Diagnosis     Hemochromatosis      9/27/2017 Imaging     MRI liver iron quantitation  Impression:     Moderate-to-severe iron deposition in the liver measuring  280 umol per gram with 300 umol per gram defining \"major iron overload\".  In May 2016 the measurement was 270 umol per gram.              10/17/2017 Imaging     CT of the abdomen and pelvis with contrast: Changes of mild hepato-steatosis.  Rim-enhancing lesion within the right ovary possibly representing a resolving cyst.         HISTORY OF PRESENT ILLNESS:  The patient is a 34 y.o. female, here for follow up on management of hemochromatosis.  She had recent labs.  Migraines are stable. No recent rashes. She continues to focus on her health, though she has gained some weight and has been unable to come to gym. Her energy level is good. 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  She has had a hysterectomy so no blood loss.           Past Medical History:   Diagnosis Date   • Abnormal uterine bleeding (AUB)    • Atrial fibrillation (CMS/HCC)    • Calcium deposits of brain     on MRI   • Cardiac abnormality    • Depression 12/16/2016   • Dysplasia of cervix    • Fibromyalgia 12/16/2016    probable   • Gestational diabetes  "    Class A1-Diet Controlled   • Hemochromatosis    • Hypertension 12/16/2016   • Inappropriate sinus tachycardia 12/16/2016    Electrophysiology study with radiofrequency ablation, June 2011 at [facility] in [place], [place], no data.   Echocardiogram February 2016: EF 60% to 65%, impaired relaxation noted. Trace MR, trace TR.    • Kidney stone    • Migraines    • Monospot test positive     History of positive Monospot.     • Nephrolithiasis    • Neurocardiogenic syncope     with tachycardia. Pt reports she had cardiac ablation in 2011.   • Steatohepatitis      Past Surgical History:   Procedure Laterality Date   • CARDIAC ABLATION  2011    bypass tract ablation   • CERVICAL CONIZATION, LEEP  2005    MODERATE DYSPLASIA   • CYSTOSCOPY URETEROSCOPY Left 7/10/2016    Procedure: CYSTOSCOPY, LEFT URETEROSCOPY, STONE EXTRACTION, LEFT URETERAL STENT INSERTION UNDER FLUROSCOPY;  Surgeon: Bernardo Simental MD;  Location: [facility];  Service:    • DILATATION AND CURETTAGE  2010   • TOTAL LAPAROSCOPIC HYSTERECTOMY Bilateral 08/19/2013    bilat SALPINGECTOMY       No Known Allergies    Family History and Social History reviewed and changed as necessary      REVIEW OF SYSTEM:   Review of Systems   Constitutional: Negative for appetite change, chills, diaphoresis, fatigue, fever and unexpected weight change.   HENT:   Negative for mouth sores, sore throat and trouble swallowing.    Eyes: Negative for icterus.   Respiratory: Negative for cough, hemoptysis and shortness of breath.    Cardiovascular: Negative for chest pain, leg swelling and palpitations.   Gastrointestinal: Negative for abdominal distention, abdominal pain, blood in stool, constipation, diarrhea, nausea and vomiting.   Endocrine: Negative for hot flashes.   Genitourinary: Negative for bladder incontinence, difficulty urinating, dysuria, frequency and hematuria.    Musculoskeletal: Negative for gait problem, neck pain and neck stiffness.   Skin: Negative  "for rash.   Neurological: Negative for dizziness, gait problem, headaches, light-headedness and numbness.   Hematological: Negative for adenopathy. Does not bruise/bleed easily.   Psychiatric/Behavioral: Negative for depression. The patient is not nervous/anxious.    All other systems reviewed and are negative.       PHYSICAL EXAM    Vitals:    07/10/20 1028   BP: 103/71   Pulse: 76   Resp: 12   Temp: 98 °F (36.7 °C)   TempSrc: Temporal   SpO2: 97%   Weight: 92.5 kg (204 lb)   Height: 156.2 cm (61.5\")     Constitutional: Appears well-developed and well-nourished. No distress.   ECOG: (0) Fully active, able to carry on all predisease performance without restriction  HENT:   Head: Normocephalic.  Malar rash unchanged  Mouth/Throat: Oropharynx is clear and moist.   Eyes: Conjunctivae are normal. Pupils are equal, round, and reactive to light. No scleral icterus.   Neck: Neck supple. No JVD present. No thyromegaly present.   Cardiovascular: Normal rate, regular rhythm and normal heart sounds.    Pulmonary/Chest: Breath sounds normal. No respiratory distress.   Abdominal: Soft. Exhibits no distension and no mass. There is no hepatosplenomegaly. There is no tenderness. There is no rebound and no guarding.   Musculoskeletal:Exhibits no edema, tenderness or deformity.   Neurological: Alert and oriented to person, place, and time. Exhibits normal muscle tone.   Skin: No ecchymosis, no petechiae and no rash noted. Not diaphoretic. No cyanosis. Nails show no clubbing.   Psychiatric: Normal mood and affect.   Vitals reviewed.      No radiology results for the last 7 days       ASSESSMENT & PLAN:    1.  Compound heterozygous hemochromatosis  2. Vasovagal syncope  3. Diastolic hypertension  4. Malar rash with arthritis    Discussion: She has not had a phlebotomy since November 2017. We discussed labs showed ferritin is 30. We will continue to hold phlebotomies until her ferritin level is above 50.  Normally below 100 is our " target, however, rheumatology believes her hemachromatosis is the culprit for her joint aches.  We will see her back in 6 months with blood counts and iron indices and CMP prior to return.     We discussed her cholesterol is elevated. I recommended to that patient she follow up with PCP for further managment.  I encouraged the patient to continue a healthy diet and exercise program to lose weight.      She will continue to follow up with rheumatology prn.         Flaquita Anderson, APRN  07/10/2020           I spent a total of  20 minutes in direct patient care, greater than  15   minutes (greater than 50%) were spent in coordination of care, and counseling the patient regarding Compound heterozygous hemochromatosis  .  I answered any questions patient had with medication and plan.

## 2020-07-15 RX ORDER — DESVENLAFAXINE 100 MG/1
100 TABLET, EXTENDED RELEASE ORAL DAILY
Qty: 30 TABLET | Refills: 10 | Status: SHIPPED | OUTPATIENT
Start: 2020-07-15 | End: 2020-08-06 | Stop reason: SDUPTHER

## 2020-08-06 ENCOUNTER — OFFICE VISIT (OUTPATIENT)
Dept: CARDIOLOGY | Facility: CLINIC | Age: 35
End: 2020-08-06

## 2020-08-06 VITALS
TEMPERATURE: 98.6 F | HEIGHT: 61 IN | DIASTOLIC BLOOD PRESSURE: 72 MMHG | HEART RATE: 69 BPM | BODY MASS INDEX: 38.71 KG/M2 | WEIGHT: 205 LBS | OXYGEN SATURATION: 99 % | SYSTOLIC BLOOD PRESSURE: 98 MMHG

## 2020-08-06 DIAGNOSIS — I10 ESSENTIAL HYPERTENSION: ICD-10-CM

## 2020-08-06 DIAGNOSIS — R55 VASOVAGAL SYNCOPE: Primary | ICD-10-CM

## 2020-08-06 DIAGNOSIS — E78.5 DYSLIPIDEMIA: ICD-10-CM

## 2020-08-06 DIAGNOSIS — R00.0 INAPPROPRIATE SINUS TACHYCARDIA: ICD-10-CM

## 2020-08-06 PROCEDURE — 99214 OFFICE O/P EST MOD 30 MIN: CPT | Performed by: PHYSICIAN ASSISTANT

## 2020-08-06 RX ORDER — BISOPROLOL FUMARATE 10 MG/1
10 TABLET, FILM COATED ORAL DAILY
COMMUNITY
End: 2020-08-06 | Stop reason: SDUPTHER

## 2020-08-06 RX ORDER — BISOPROLOL FUMARATE 10 MG/1
10 TABLET, FILM COATED ORAL DAILY
Qty: 30 TABLET | Refills: 11 | Status: SHIPPED | OUTPATIENT
Start: 2020-08-06 | End: 2021-02-11 | Stop reason: SDUPTHER

## 2020-08-06 RX ORDER — DESVENLAFAXINE 100 MG/1
100 TABLET, EXTENDED RELEASE ORAL DAILY
Qty: 30 TABLET | Refills: 11 | Status: SHIPPED | OUTPATIENT
Start: 2020-08-06 | End: 2021-02-11 | Stop reason: SDUPTHER

## 2020-08-06 NOTE — PROGRESS NOTES
[facility] - Office Note  Cristina Martínez         [address] [place] [address]  1985   [phone] (home)      LOCATION:  [facility].  Visit Type: Follow Up.    PCP:  Rahat Mercedes MD    08/06/20   Cristina Martínez is a 34 y.o.  female  currently employed.      Chief Complaint: FU on syncope, tachycardia, HTN    PROBLEM LIST/PMHx:   1. Vasovagal syncope:   a. Echocardiogram, 04/12/2007, showing trace MR, TR, EF 60%.  b. Echocardiogram, March 2008: Normal LV size and function.   c. Echocardiogram, November 2013: EF 60% to 65%, trace MR.   2. Inappropriate sinus tachycardia:   a. Electrophysiology study with radiofrequency ablation, June 2011 at [facility] in [place], [place], no data.   b. Echocardiogram February 2016: EF 60% to 65%, impaired relaxation noted. Trace MR, trace TR.  3. Essential Hypertension.   4. Sinus tachycardia.   5. Migraine headaches.   6. Probable fibromyalgia.   7. Hereditary hemochromatosis, status post liver biopsy.   8. History of positive Monospot.   9. Depression.   10. Status post hysterectomy, August 2013.  11. Dyslipidemia         No Known Allergies      Current Outpatient Medications:   •  bisoprolol (ZEBeta) 10 MG tablet, Take 10 mg by mouth Daily., Disp: , Rfl:   •  cetirizine (zyrTEC) 10 MG tablet, Take 10 mg by mouth Daily., Disp: , Rfl:   •  desvenlafaxine (PRISTIQ) 100 MG 24 hr tablet, TAKE 1 TABLET BY MOUTH DAILY., Disp: 30 tablet, Rfl: 10  •  Melatonin 10 MG tablet, Take 1 tablet by mouth At Night As Needed., Disp: , Rfl:   •  Multiple Vitamin (MULTI-VITAMIN DAILY PO), Take 1 tablet by mouth Daily As Needed., Disp: , Rfl:   •  omeprazole (priLOSEC) 20 MG capsule, Take 1 capsule by mouth Daily., Disp: 30 capsule, Rfl: 5    HPI  Cristina Martínez is here today for an annual follow up on h/o vasovagal syncope, IAST and HTN.  She is doing well without any complaints of chest pain, no dyspnea  "or weakness.  She had one episode ~3 months ago of palpitations and elevated HR but overall has been well controlled.  She does need refills on her medications.  She had recent blood work with PCP.  With recent quarantine, she has not been as active and admits to weight gain.  She attributes her lipid results to this but also states there is a strong family history of HLP and diabetes.          The following portions of the patient's history were reviewed in the chart and updated as appropriate: allergies, current medications, past family history, past medical history, past social history, past surgical history and problem list.    Review of Systems   Constitution: Positive for weight gain. Negative for fever and malaise/fatigue.   Cardiovascular: Negative for chest pain, dyspnea on exertion, irregular heartbeat, leg swelling, near-syncope and palpitations.   Respiratory: Negative for cough and shortness of breath.    Skin: Positive for nail changes.             height is 154.9 cm (61\") and weight is 93 kg (205 lb). Her temperature is 98.6 °F (37 °C). Her blood pressure is 98/72 and her pulse is 69. Her oxygen saturation is 99%.   Physical Exam   Constitutional: Vital signs are normal. She appears well-developed and well-nourished.   Cardiovascular: Normal rate, regular rhythm, S1 normal, S2 normal, normal heart sounds, intact distal pulses and normal pulses.   Pulmonary/Chest: Effort normal and breath sounds normal. She has no wheezes. She has no rhonchi. She has no rales.   Abdominal: Soft. Normal appearance and bowel sounds are normal. There is no hepatosplenomegaly.   Neurological: She is alert.         Procedures     Assessment/ Plan     Vasovagal syncope:  No true syncope recently.  Continue medications and monitor.  Continue hydration and appropriate postural work.  RTC 1 year with EKG or sooner PRN.    Inappropriate sinus tachycardia:  EKG performed and reviewed today shows NSR, HR 69 bpm.  Provide refills, " no medication changes, RTC 1 year.    Essential hypertension:  Well controlled.  Recent labs from July 2020 reviewed.  Continue activity as tolerated and watch dietary measures.    Dyslipidemia:  Again, recent lipid panel was reviewed.  Continue with appropriate dietary measures and aerobic activity.  Get annual lipid panel with PCP.    Ila Vergara PA-C functioning independently.  8/6/2020 14:04

## 2020-08-13 DIAGNOSIS — K21.9 GASTROESOPHAGEAL REFLUX DISEASE, ESOPHAGITIS PRESENCE NOT SPECIFIED: ICD-10-CM

## 2020-08-13 RX ORDER — OMEPRAZOLE 20 MG/1
20 CAPSULE, DELAYED RELEASE ORAL DAILY
Qty: 30 CAPSULE | Refills: 4 | Status: SHIPPED | OUTPATIENT
Start: 2020-08-13 | End: 2021-02-05

## 2021-01-13 ENCOUNTER — LAB (OUTPATIENT)
Dept: LAB | Facility: HOSPITAL | Age: 36
End: 2021-01-13

## 2021-01-13 DIAGNOSIS — E83.110 HEREDITARY HEMOCHROMATOSIS (HCC): ICD-10-CM

## 2021-01-13 LAB
ALBUMIN SERPL-MCNC: 4.4 G/DL (ref 3.5–5.2)
ALBUMIN/GLOB SERPL: 1.4 G/DL
ALP SERPL-CCNC: 65 U/L (ref 39–117)
ALT SERPL W P-5'-P-CCNC: 20 U/L (ref 1–33)
ANION GAP SERPL CALCULATED.3IONS-SCNC: 9.9 MMOL/L (ref 5–15)
AST SERPL-CCNC: 18 U/L (ref 1–32)
BASOPHILS # BLD AUTO: 0.06 10*3/MM3 (ref 0–0.2)
BASOPHILS NFR BLD AUTO: 0.7 % (ref 0–1.5)
BILIRUB SERPL-MCNC: 0.3 MG/DL (ref 0–1.2)
BUN SERPL-MCNC: 12 MG/DL (ref 6–20)
BUN/CREAT SERPL: 16.4 (ref 7–25)
CALCIUM SPEC-SCNC: 9.7 MG/DL (ref 8.6–10.5)
CHLORIDE SERPL-SCNC: 99 MMOL/L (ref 98–107)
CO2 SERPL-SCNC: 26.1 MMOL/L (ref 22–29)
CREAT SERPL-MCNC: 0.73 MG/DL (ref 0.57–1)
DEPRECATED RDW RBC AUTO: 39.7 FL (ref 37–54)
EOSINOPHIL # BLD AUTO: 0.15 10*3/MM3 (ref 0–0.4)
EOSINOPHIL NFR BLD AUTO: 1.6 % (ref 0.3–6.2)
ERYTHROCYTE [DISTWIDTH] IN BLOOD BY AUTOMATED COUNT: 12.2 % (ref 12.3–15.4)
FERRITIN SERPL-MCNC: 44.91 NG/ML (ref 13–150)
GFR SERPL CREATININE-BSD FRML MDRD: 90 ML/MIN/1.73
GLOBULIN UR ELPH-MCNC: 3.1 GM/DL
GLUCOSE SERPL-MCNC: 88 MG/DL (ref 65–99)
HCT VFR BLD AUTO: 44.9 % (ref 34–46.6)
HGB BLD-MCNC: 15.3 G/DL (ref 12–15.9)
IMM GRANULOCYTES # BLD AUTO: 0.02 10*3/MM3 (ref 0–0.05)
IMM GRANULOCYTES NFR BLD AUTO: 0.2 % (ref 0–0.5)
IRON 24H UR-MRATE: 104 MCG/DL (ref 37–145)
IRON SATN MFR SERPL: 25 % (ref 20–50)
LYMPHOCYTES # BLD AUTO: 4.42 10*3/MM3 (ref 0.7–3.1)
LYMPHOCYTES NFR BLD AUTO: 48.6 % (ref 19.6–45.3)
MCH RBC QN AUTO: 30.2 PG (ref 26.6–33)
MCHC RBC AUTO-ENTMCNC: 34.1 G/DL (ref 31.5–35.7)
MCV RBC AUTO: 88.6 FL (ref 79–97)
MONOCYTES # BLD AUTO: 0.82 10*3/MM3 (ref 0.1–0.9)
MONOCYTES NFR BLD AUTO: 9 % (ref 5–12)
NEUTROPHILS NFR BLD AUTO: 3.63 10*3/MM3 (ref 1.7–7)
NEUTROPHILS NFR BLD AUTO: 39.9 % (ref 42.7–76)
NRBC BLD AUTO-RTO: 0 /100 WBC (ref 0–0.2)
PLATELET # BLD AUTO: 210 10*3/MM3 (ref 140–450)
PMV BLD AUTO: 10.4 FL (ref 6–12)
POTASSIUM SERPL-SCNC: 4.4 MMOL/L (ref 3.5–5.2)
PROT SERPL-MCNC: 7.5 G/DL (ref 6–8.5)
RBC # BLD AUTO: 5.07 10*6/MM3 (ref 3.77–5.28)
SODIUM SERPL-SCNC: 135 MMOL/L (ref 136–145)
TIBC SERPL-MCNC: 420 MCG/DL (ref 298–536)
TRANSFERRIN SERPL-MCNC: 282 MG/DL (ref 200–360)
WBC # BLD AUTO: 9.1 10*3/MM3 (ref 3.4–10.8)

## 2021-01-13 PROCEDURE — 83540 ASSAY OF IRON: CPT

## 2021-01-13 PROCEDURE — 82728 ASSAY OF FERRITIN: CPT

## 2021-01-13 PROCEDURE — 80053 COMPREHEN METABOLIC PANEL: CPT

## 2021-01-13 PROCEDURE — 84466 ASSAY OF TRANSFERRIN: CPT

## 2021-01-13 PROCEDURE — 36415 COLL VENOUS BLD VENIPUNCTURE: CPT

## 2021-01-13 PROCEDURE — 85025 COMPLETE CBC W/AUTO DIFF WBC: CPT

## 2021-01-15 ENCOUNTER — OFFICE VISIT (OUTPATIENT)
Dept: ONCOLOGY | Facility: CLINIC | Age: 36
End: 2021-01-15

## 2021-01-15 VITALS
OXYGEN SATURATION: 99 % | SYSTOLIC BLOOD PRESSURE: 111 MMHG | HEART RATE: 86 BPM | DIASTOLIC BLOOD PRESSURE: 73 MMHG | BODY MASS INDEX: 38.51 KG/M2 | HEIGHT: 61 IN | RESPIRATION RATE: 12 BRPM | TEMPERATURE: 97.8 F | WEIGHT: 204 LBS

## 2021-01-15 DIAGNOSIS — E66.01 MORBID OBESITY (HCC): ICD-10-CM

## 2021-01-15 DIAGNOSIS — E83.110 HEREDITARY HEMOCHROMATOSIS (HCC): Primary | ICD-10-CM

## 2021-01-15 PROCEDURE — 99214 OFFICE O/P EST MOD 30 MIN: CPT | Performed by: NURSE PRACTITIONER

## 2021-01-15 NOTE — PROGRESS NOTES
"CHIEF COMPLAINT: Management of hemochromatosis.    Problem List:  Oncology/Hematology History Overview Note   1. 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  No other significant upper abdominal pathology. Liver otherwise appears unremarkable.   2. History of hysterectomy.   3. History of neurocardiogenic syncope status post cardiac ablation by Dr. Gentile.   4.  Passage of kidney stone with retrieval July 2016  5.  Diastolic hypertension  6.  Degenerative arthritis felt to be related to hemochromatosis according to pathology     Hemochromatosis   5/1/2007 Initial Diagnosis    Hemochromatosis     9/27/2017 Imaging    MRI liver iron quantitation  Impression:     Moderate-to-severe iron deposition in the liver measuring  280 umol per gram with 300 umol per gram defining \"major iron overload\".  In May 2016 the measurement was 270 umol per gram.             10/17/2017 Imaging    CT of the abdomen and pelvis with contrast: Changes of mild hepato-steatosis.  Rim-enhancing lesion within the right ovary possibly representing a resolving cyst.         HISTORY OF PRESENT ILLNESS:  The patient is a 35 y.o. female, here for follow up on management of hemochromatosis.  She had recent labs.  Migraines are stable. No recent rashes.  She has been working from home.  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  Weight is stable overall.  That she feels less energetic.         Past Medical History:   Diagnosis Date   • Abnormal uterine bleeding (AUB)    • Atrial fibrillation (CMS/HCC)    • Calcium deposits of brain     on MRI   • Cardiac abnormality    • Depression 12/16/2016   • Dysplasia of cervix    • Fibromyalgia 12/16/2016    probable   • Gestational diabetes     Class A1-Diet Controlled   • Hemochromatosis    • Hypertension 12/16/2016   • " Inappropriate sinus tachycardia 12/16/2016    Electrophysiology study with radiofrequency ablation, June 2011 at [facility] in [place], [place], no data.   Echocardiogram February 2016: EF 60% to 65%, impaired relaxation noted. Trace MR, trace TR.    • Kidney stone    • Migraines    • Monospot test positive     History of positive Monospot.     • Nephrolithiasis    • Neurocardiogenic syncope     with tachycardia. Pt reports she had cardiac ablation in 2011.   • Steatohepatitis      Past Surgical History:   Procedure Laterality Date   • CARDIAC ABLATION  2011    bypass tract ablation   • CERVICAL CONIZATION, LEEP  2005    MODERATE DYSPLASIA   • CYSTOSCOPY URETEROSCOPY Left 7/10/2016    Procedure: CYSTOSCOPY, LEFT URETEROSCOPY, STONE EXTRACTION, LEFT URETERAL STENT INSERTION UNDER FLUROSCOPY;  Surgeon: Bernardo Simental MD;  Location: [facility];  Service:    • DILATATION AND CURETTAGE  2010   • TOTAL LAPAROSCOPIC HYSTERECTOMY Bilateral 08/19/2013    bilat SALPINGECTOMY       No Known Allergies    Family History and Social History reviewed and changed as necessary      REVIEW OF SYSTEM:   Review of Systems   Constitutional: Negative for appetite change, chills, diaphoresis, fatigue, fever and unexpected weight change.   HENT:   Negative for mouth sores, sore throat and trouble swallowing.    Eyes: Negative for icterus.   Respiratory: Negative for cough, hemoptysis and shortness of breath.    Cardiovascular: Negative for chest pain, leg swelling and palpitations.   Gastrointestinal: Negative for abdominal distention, abdominal pain, blood in stool, constipation, diarrhea, nausea and vomiting.   Endocrine: Negative for hot flashes.   Genitourinary: Negative for bladder incontinence, difficulty urinating, dysuria, frequency and hematuria.    Musculoskeletal: Negative for gait problem, neck pain and neck stiffness.   Skin: Negative for rash.   Neurological: Negative for dizziness, gait problem, headaches,  "light-headedness and numbness.   Hematological: Negative for adenopathy. Does not bruise/bleed easily.   Psychiatric/Behavioral: Negative for depression. The patient is not nervous/anxious.    All other systems reviewed and are negative.       PHYSICAL EXAM    Vitals:    01/15/21 1126   BP: 111/73   Pulse: 86   Resp: 12   Temp: 97.8 °F (36.6 °C)   TempSrc: Temporal   SpO2: 99%   Weight: 92.5 kg (204 lb)   Height: 154.9 cm (61\")     Constitutional: Appears well-developed and well-nourished. No distress.   ECOG: (0) Fully active, able to carry on all predisease performance without restriction  HENT:   Head: Normocephalic.  Malar rash unchanged  Mouth/Throat: Oropharynx is clear and moist.   Eyes: Conjunctivae are normal. Pupils are equal, round, and reactive to light. No scleral icterus.   Neck: Neck supple. No JVD present. No thyromegaly present.   Cardiovascular: Normal rate, regular rhythm and normal heart sounds.    Pulmonary/Chest: Breath sounds normal. No respiratory distress.   Abdominal: Soft. Exhibits no distension and no mass. There is no hepatosplenomegaly. There is no tenderness. There is no rebound and no guarding.   Musculoskeletal:Exhibits no edema, tenderness or deformity.   Neurological: Alert and oriented to person, place, and time. Exhibits normal muscle tone.   Skin: No ecchymosis, no petechiae and no rash noted. Not diaphoretic. No cyanosis. Nails show no clubbing.   Psychiatric: Normal mood and affect.   Vitals reviewed.      No radiology results for the last 7 days       ASSESSMENT & PLAN:    1.  Compound heterozygous hemochromatosis  2. Vasovagal syncope  3. Diastolic hypertension  4. Malar rash with arthritis    Discussion: She has not had a phlebotomy since November 2017. We discussed labs showed ferritin is 44.  CBC and CMP able and unchanged.  Discussed we will hold phlebotomy today.  However I do think her ferritin level is likely to be above 50 in 6 months so we will tentatively plan for " a phlebotomy at next visit.  We will continue to hold phlebotomies until her ferritin level is above 50.  Normally below 100 is our target, however, rheumatology believes her hemachromatosis is the culprit for her joint aches.  We will see her back in 6 months with blood counts and iron panel and CMP prior to return.     Obesity.  I recommended to the patient that she start increasing exercise and watching her caloric intake.  Weight is stable overall but in the past year she has gained a few pounds.    She will continue to follow up with rheumatology prn.         Flaquita Anderson, APRN  01/27/21

## 2021-01-22 ENCOUNTER — LAB (OUTPATIENT)
Dept: LAB | Facility: HOSPITAL | Age: 36
End: 2021-01-22

## 2021-01-22 ENCOUNTER — OFFICE VISIT (OUTPATIENT)
Dept: INTERNAL MEDICINE | Facility: CLINIC | Age: 36
End: 2021-01-22

## 2021-01-22 VITALS
DIASTOLIC BLOOD PRESSURE: 82 MMHG | HEIGHT: 61 IN | HEART RATE: 72 BPM | BODY MASS INDEX: 38.63 KG/M2 | SYSTOLIC BLOOD PRESSURE: 106 MMHG | TEMPERATURE: 97.3 F | WEIGHT: 204.6 LBS

## 2021-01-22 DIAGNOSIS — L91.8 INFLAMED SKIN TAG: ICD-10-CM

## 2021-01-22 DIAGNOSIS — E55.9 VITAMIN D DEFICIENCY: ICD-10-CM

## 2021-01-22 DIAGNOSIS — R73.9 HYPERGLYCEMIA: ICD-10-CM

## 2021-01-22 DIAGNOSIS — F33.1 MODERATE RECURRENT MAJOR DEPRESSION (HCC): ICD-10-CM

## 2021-01-22 DIAGNOSIS — R53.83 OTHER FATIGUE: ICD-10-CM

## 2021-01-22 DIAGNOSIS — E78.5 DYSLIPIDEMIA: ICD-10-CM

## 2021-01-22 DIAGNOSIS — I10 ESSENTIAL HYPERTENSION: Primary | ICD-10-CM

## 2021-01-22 LAB
CHOLEST SERPL-MCNC: 258 MG/DL (ref 0–200)
HDLC SERPL-MCNC: 59 MG/DL (ref 40–60)
LDLC SERPL CALC-MCNC: 176 MG/DL (ref 0–100)
LDLC/HDLC SERPL: 2.95 {RATIO}
TRIGL SERPL-MCNC: 126 MG/DL (ref 0–150)
VLDLC SERPL-MCNC: 23 MG/DL (ref 5–40)

## 2021-01-22 PROCEDURE — 82306 VITAMIN D 25 HYDROXY: CPT

## 2021-01-22 PROCEDURE — 84443 ASSAY THYROID STIM HORMONE: CPT

## 2021-01-22 PROCEDURE — 80061 LIPID PANEL: CPT

## 2021-01-22 PROCEDURE — 99214 OFFICE O/P EST MOD 30 MIN: CPT | Performed by: INTERNAL MEDICINE

## 2021-01-22 PROCEDURE — 11200 RMVL SKIN TAGS UP TO&INC 15: CPT | Performed by: INTERNAL MEDICINE

## 2021-01-22 PROCEDURE — 83036 HEMOGLOBIN GLYCOSYLATED A1C: CPT

## 2021-01-22 NOTE — PROGRESS NOTES
[facility]     [facility] Florecita Martínez  1985   [id number]      Patient Care Team:  Rahat Mercedes MD as PCP - General  Rahat Mercedes MD as PCP - Family Medicine  Yonas Pavon MD as Consulting Physician (Hematology and Oncology)  Jared Spence MD as Consulting Physician (Gastroenterology)  Dashawn Gentile MD as Consulting Physician (Cardiology)    Chief Complaint::   Chief Complaint   Patient presents with   • Hypertension   • Atrial Fibrillation        HPI  Ms. Martínez comes in for follow-up of her hypertension, dyslipidemia, hyperglycemia, vitamin D deficiency, depression and obesity.  Overall she feels well.  She is frustrated because she has not been able to lose weight.  That is a longstanding problem.  She does try to walk every day but has not been going to the gym because of Covid.  Her mood is reasonably well controlled on current medication.  She sees cardiology for atrial fibrillation and hematology for her hemochromatosis.  She has a skin lesion on her neck she is concerned about.  There is no fever, cough, shortness of breath or chest pain.    Chronic Conditions:      Patient Active Problem List   Diagnosis   • Urolithiasis   • Hyperglycemia   • Hemochromatosis   • Vasovagal syncope   • Inappropriate sinus tachycardia   • Hypertension   • History of migraine headaches   • Fibromyalgia   • Depression   • Tachycardia   • Family history of colon cancer paternal grandmother   • Annual physical exam   • Atrial fibrillation (CMS/HCC)   • Chest pain   • Other disorders of iron metabolism   • External hemorrhoids   • Generalized abdominal pain   • Dyslipidemia   • Irritable bowel syndrome   • Moderate recurrent major depression (CMS/HCC)   • Morbid obesity (CMS/HCC)   • Syncope and collapse   • Neck pain   • Vitamin D deficiency        Past Medical History:   Diagnosis Date   • Abnormal uterine bleeding (AUB)    • Atrial fibrillation (CMS/HCC)    • Calcium deposits of  brain     on MRI   • Cardiac abnormality    • Depression 12/16/2016   • Dysplasia of cervix    • Fibromyalgia 12/16/2016    probable   • Gestational diabetes     Class A1-Diet Controlled   • Hemochromatosis    • Hypertension 12/16/2016   • Inappropriate sinus tachycardia 12/16/2016    Electrophysiology study with radiofrequency ablation, June 2011 at [facility] in [place], [place], no data.   Echocardiogram February 2016: EF 60% to 65%, impaired relaxation noted. Trace MR, trace TR.    • Kidney stone    • Migraines    • Monospot test positive     History of positive Monospot.     • Nephrolithiasis    • Neurocardiogenic syncope     with tachycardia. Pt reports she had cardiac ablation in 2011.   • Steatohepatitis        Past Surgical History:   Procedure Laterality Date   • CARDIAC ABLATION  2011    bypass tract ablation   • CERVICAL CONIZATION, LEEP  2005    MODERATE DYSPLASIA   • CYSTOSCOPY URETEROSCOPY Left 7/10/2016    Procedure: CYSTOSCOPY, LEFT URETEROSCOPY, STONE EXTRACTION, LEFT URETERAL STENT INSERTION UNDER FLUROSCOPY;  Surgeon: Bernardo Simental MD;  Location: [facility];  Service:    • DILATATION AND CURETTAGE  2010   • TOTAL LAPAROSCOPIC HYSTERECTOMY Bilateral 08/19/2013    bilat SALPINGECTOMY       Family History   Problem Relation Age of Onset   • Colon cancer Paternal Grandmother    • Heart disease Mother    • Depression Mother    • Migraines Mother    • Hypertension Father    • Diabetes Father    • Hemochromatosis Father    • Hyperlipidemia Father    • Bradycardia Brother        Social History     Socioeconomic History   • Marital status:      Spouse name: Not on file   • Number of children: Not on file   • Years of education: Not on file   • Highest education level: Not on file   Tobacco Use   • Smoking status: Never Smoker   • Smokeless tobacco: Never Used   Substance and Sexual Activity   • Alcohol use: No   • Drug use: No   • Sexual activity: Yes     Partners: Male     Birth  "control/protection: Surgical       No Known Allergies      Current Outpatient Medications:   •  bisoprolol (ZEBeta) 10 MG tablet, Take 1 tablet by mouth Daily., Disp: 30 tablet, Rfl: 11  •  cetirizine (zyrTEC) 10 MG tablet, Take 10 mg by mouth Daily., Disp: , Rfl:   •  desvenlafaxine (PRISTIQ) 100 MG 24 hr tablet, Take 1 tablet by mouth Daily., Disp: 30 tablet, Rfl: 11  •  Melatonin 10 MG tablet, Take 1 tablet by mouth At Night As Needed., Disp: , Rfl:   •  Multiple Vitamin (MULTI-VITAMIN DAILY PO), Take 1 tablet by mouth Daily As Needed., Disp: , Rfl:   •  omeprazole (priLOSEC) 20 MG capsule, TAKE 1 CAPSULE BY MOUTH DAILY., Disp: 30 capsule, Rfl: 4    Review of Systems   Constitutional: Negative for chills, fatigue and fever.   HENT: Positive for ear pain. Negative for congestion and sinus pressure.    Respiratory: Negative for cough, chest tightness, shortness of breath and wheezing.    Cardiovascular: Negative for chest pain and palpitations.   Gastrointestinal: Negative for abdominal pain, blood in stool and constipation.   Skin: Negative for color change.   Allergic/Immunologic: Positive for environmental allergies.   Neurological: Negative for dizziness, speech difficulty and headache.   Psychiatric/Behavioral: Negative for decreased concentration. The patient is not nervous/anxious.         Vital Signs  Vitals:    01/22/21 1115   BP: 106/82   BP Location: Left arm   Patient Position: Sitting   Cuff Size: Adult   Pulse: 72   Temp: 97.3 °F (36.3 °C)   Weight: 92.8 kg (204 lb 9.6 oz)   Height: 154.9 cm (60.98\")   PainSc: 0-No pain       Physical Exam  Vitals signs reviewed.   Constitutional:       Appearance: She is well-developed.   HENT:      Head: Normocephalic and atraumatic.   Cardiovascular:      Rate and Rhythm: Normal rate and regular rhythm.      Heart sounds: Normal heart sounds. No murmur.   Pulmonary:      Effort: Pulmonary effort is normal.      Breath sounds: Normal breath sounds.   Skin:     " Comments: There is a fleshy 1/2 cm mass on the midline neck posteriorly.   Neurological:      Mental Status: She is alert and oriented to person, place, and time.          Skin Tag Removal    Date/Time: 1/22/2021 11:57 AM  Performed by: Rahat Mercedes MD  Authorized by: Rahat Mercedes MD   Local anesthesia used: no    Anesthesia:  Local anesthesia used: no    Sedation:  Patient sedated: no    Patient tolerance: patient tolerated the procedure well with no immediate complications  Comments: Skin tag on posterior neck was treated with cryotherapy.          ACE III MINI             Assessment/Plan:    Diagnoses and all orders for this visit:    1. Essential hypertension (Primary)    2. Dyslipidemia  -     Lipid Panel; Future    3. Hyperglycemia  -     Hemoglobin A1c; Future    4. Vitamin D deficiency  -     Vitamin D 25 Hydroxy; Future    5. Other fatigue  -     TSH; Future    6. Moderate recurrent major depression (CMS/HCC)    Plan    Blood pressure is well controlled on bisoprolol.    Lipid panel is pending, she will continue working on healthy diet and weight loss.    A1c is pending, see diet instructions above.    Vitamin D level is pending, she is taking at 1000 units of vitamin D daily.    Her mood is well compensated on Pristiq.      Plan of care reviewed with patient at the conclusion of today's visit. Education was provided regarding diagnosis, management, and any prescribed or recommended OTC medications.Patient verbalizes understanding of and agreement with management plan.         Rahat Mercedes MD

## 2021-01-23 LAB
25(OH)D3 SERPL-MCNC: 25.9 NG/ML (ref 30–100)
HBA1C MFR BLD: 5.4 % (ref 4.8–5.6)
TSH SERPL DL<=0.05 MIU/L-ACNC: 1.32 UIU/ML (ref 0.27–4.2)

## 2021-01-27 ENCOUNTER — OFFICE VISIT (OUTPATIENT)
Dept: OBSTETRICS AND GYNECOLOGY | Facility: CLINIC | Age: 36
End: 2021-01-27

## 2021-01-27 VITALS
BODY MASS INDEX: 37.54 KG/M2 | DIASTOLIC BLOOD PRESSURE: 70 MMHG | SYSTOLIC BLOOD PRESSURE: 118 MMHG | WEIGHT: 204 LBS | HEIGHT: 62 IN

## 2021-01-27 DIAGNOSIS — Z01.419 ENCOUNTER FOR GYNECOLOGICAL EXAMINATION WITHOUT ABNORMAL FINDING: Primary | ICD-10-CM

## 2021-01-27 PROBLEM — Z00.00 ANNUAL PHYSICAL EXAM: Status: RESOLVED | Noted: 2019-05-13 | Resolved: 2021-01-27

## 2021-01-27 PROBLEM — Z80.0 FAMILY HISTORY OF COLON CANCER: Status: RESOLVED | Noted: 2017-11-30 | Resolved: 2021-01-27

## 2021-01-27 PROCEDURE — 99395 PREV VISIT EST AGE 18-39: CPT | Performed by: OBSTETRICS & GYNECOLOGY

## 2021-01-27 RX ORDER — MELATONIN
1000 DAILY
COMMUNITY

## 2021-01-27 NOTE — PROGRESS NOTES
Subjective   Chief Complaint   Patient presents with   • Annual Exam     Cristina Martínez is a 35 y.o. year old  who is S/P hysterectomy presenting to be seen for her annual exam.  She recently saw Dr. Mercedes did lab work.  I also discussed taking the over-the-counter vitamin D's to aid in absorption of calcium to prevent osteoporosis.  If she were to take that might not need to repeat the vitamin D level.  She has been doing fairly well with Covid has not gained any weight that she is aware of.      SEXUAL Hx:  She is currently sexually active.   She does nothave pain or problems with sex  Concerns about domestic violence:no  HEALTH Hx:  Level of weekly physical activity:1.5 hours  She wears her seat belt: yes  Self breast awareness: no  Caffeine intake : none coffee cup equivalents  Calcium intake: 3servings per day  Social History    Tobacco Use      Smoking status: Never Smoker      Smokeless tobacco: Never Used    Social History     Substance and Sexual Activity   Alcohol Use No             The following portions of the patient's history were reviewed and updated as appropriate:problem list, current medications, allergies, past family history, past medical history, past social history and past surgical history.    Current Outpatient Medications:   •  bisoprolol (ZEBeta) 10 MG tablet, Take 1 tablet by mouth Daily., Disp: 30 tablet, Rfl: 11  •  cetirizine (zyrTEC) 10 MG tablet, Take 10 mg by mouth Daily., Disp: , Rfl:   •  cholecalciferol (VITAMIN D3) 25 MCG (1000 UT) tablet, Take 1,000 Units by mouth Daily., Disp: , Rfl:   •  desvenlafaxine (PRISTIQ) 100 MG 24 hr tablet, Take 1 tablet by mouth Daily., Disp: 30 tablet, Rfl: 11  •  Melatonin 10 MG tablet, Take 1 tablet by mouth At Night As Needed., Disp: , Rfl:   •  Multiple Vitamin (MULTI-VITAMIN DAILY PO), Take 1 tablet by mouth Daily As Needed., Disp: , Rfl:   •  omeprazole (priLOSEC) 20 MG capsule, TAKE 1 CAPSULE BY MOUTH DAILY., Disp: 30  "capsule, Rfl: 4    Review of Systems  Constitutional   POS: chills    NEG: anorexia, fevers, night sweats or weight gain   Gastointestinal POS: nothing reported    NEG: bloating, change in bowel habits, melena or reflux symptoms   Genitourinary POS: nothing reported    NEG:dysuria, frequency or hematuria   Breast                POS: nothing reported    NEG: persistent breast lump, skin dimpling, breast tenderness or nipple discharge                Objective   /70   Ht 156.8 cm (61.75\")   Wt 92.5 kg (204 lb)   LMP  (LMP Unknown)   Breastfeeding No   BMI 37.61 kg/m²     General:  well developed; well nourished  no acute distress  appears stated age   Skin:  No suspicious lesions seen   Thyroid: not examined   Breasts:  Examined in supine position  Symmetric without masses or skin dimpling  Nipples normal without inversion, lesions or discharge  Fibrocystic changes are present both breasts without a discrete mass   Abdomen: soft, non-tender; no masses  no umbilical or inguinal hernias are present  no hepato-splenomegaly   Pelvis: Clinical staff was present for exam  External genitalia:  normal appearance of the external genitalia including Bartholin's and [place]'s glands.  :  urethral meatus normal;  Vaginal:  normal pink mucosa without prolapse or lesions.  Uterus:  absent.  Adnexa:  normal bimanual exam of the adnexa.  Rectal:  digital rectal exam not performed; anus visually normal appearing.       Lab Review   CBC, CMP, LIPIDS, PATHOLOGY  , TSH and Vitamin D  Imaging Review   CT of abdomen/pelvis report               Assessment     1.  Normal post hysterectomy examination.  Reviewed that she had adenomyosis and also some painful intercourse prior to her hysterectomy.  Ovaries remain.  Discussed self breast examination and mammogram protocols probably beginning at age 45  She has a paternal grandmother with colon cancer but I do not think that would change the screening protocol starting at age 45     "   Plan     1. 1000 mg calcium in divided doses ; ideally in her diet with her current 2 cups of almond milk.  2. Regular exercise; she is going the gym twice a week hopefully could do some walking other days for 30 to 45 minutes  3. Self breast awareness, mammogram protocols discussed  4.   Follow up for annual exam or sooner for any problems.  She may be able to space out screening somewhat if she is not getting medications from me and doing well.      No orders of the defined types were placed in this encounter.    No orders of the defined types were placed in this encounter.           This note was electronically signed.    Otf Young M.D.  January 27, 2021

## 2021-02-05 DIAGNOSIS — K21.9 GASTROESOPHAGEAL REFLUX DISEASE: ICD-10-CM

## 2021-02-05 RX ORDER — OMEPRAZOLE 20 MG/1
20 CAPSULE, DELAYED RELEASE ORAL DAILY
Qty: 30 CAPSULE | Refills: 5 | Status: SHIPPED | OUTPATIENT
Start: 2021-02-05 | End: 2021-02-11 | Stop reason: SDUPTHER

## 2021-02-11 DIAGNOSIS — K21.9 GASTROESOPHAGEAL REFLUX DISEASE: ICD-10-CM

## 2021-02-11 NOTE — TELEPHONE ENCOUNTER
Caller: JULIO CESAR ADAMS     Relationship: PHARMACIST     Best call back number: [phone]    Medication needed:   Requested Prescriptions     Pending Prescriptions Disp Refills   • desvenlafaxine (PRISTIQ) 100 MG 24 hr tablet 30 tablet 11     Sig: Take 1 tablet by mouth Daily.   • omeprazole (priLOSEC) 20 MG capsule 30 capsule 5     Sig: Take 1 capsule by mouth Daily.   • bisoprolol (ZEBeta) 10 MG tablet 30 tablet 11     Sig: Take 1 tablet by mouth Daily.       When do you need the refill by:02/12/2021  What details did the patient provide when requesting the medication: PATIENT IS OUT OF PRISTIQ     Does the patient have less than a 3 day supply:  [x] Yes  [] No    What is the patient's preferred pharmacy: [facility] [address] [phone] [facility] [phone]

## 2021-02-12 RX ORDER — DESVENLAFAXINE 100 MG/1
100 TABLET, EXTENDED RELEASE ORAL DAILY
Qty: 30 TABLET | Refills: 11 | Status: SHIPPED | OUTPATIENT
Start: 2021-02-12 | End: 2021-06-30

## 2021-02-12 RX ORDER — OMEPRAZOLE 20 MG/1
20 CAPSULE, DELAYED RELEASE ORAL DAILY
Qty: 30 CAPSULE | Refills: 5 | Status: SHIPPED | OUTPATIENT
Start: 2021-02-12 | End: 2021-08-30

## 2021-02-12 RX ORDER — BISOPROLOL FUMARATE 10 MG/1
10 TABLET, FILM COATED ORAL DAILY
Qty: 30 TABLET | Refills: 11 | Status: SHIPPED | OUTPATIENT
Start: 2021-02-12 | End: 2021-12-05 | Stop reason: HOSPADM

## 2021-06-30 RX ORDER — DESVENLAFAXINE 100 MG/1
TABLET, EXTENDED RELEASE ORAL
Qty: 30 TABLET | Refills: 4 | Status: SHIPPED | OUTPATIENT
Start: 2021-06-30 | End: 2022-03-08

## 2021-07-13 DIAGNOSIS — E83.110 HEREDITARY HEMOCHROMATOSIS (HCC): Primary | ICD-10-CM

## 2021-07-14 ENCOUNTER — LAB (OUTPATIENT)
Dept: LAB | Facility: HOSPITAL | Age: 36
End: 2021-07-14

## 2021-07-14 DIAGNOSIS — E83.110 HEREDITARY HEMOCHROMATOSIS (HCC): ICD-10-CM

## 2021-07-14 LAB
ALBUMIN SERPL-MCNC: 4.6 G/DL (ref 3.5–5.2)
ALBUMIN/GLOB SERPL: 1.4 G/DL
ALP SERPL-CCNC: 68 U/L (ref 39–117)
ALT SERPL W P-5'-P-CCNC: 24 U/L (ref 1–33)
ANION GAP SERPL CALCULATED.3IONS-SCNC: 9.3 MMOL/L (ref 5–15)
AST SERPL-CCNC: 21 U/L (ref 1–32)
BASOPHILS # BLD AUTO: 0.05 10*3/MM3 (ref 0–0.2)
BASOPHILS NFR BLD AUTO: 0.8 % (ref 0–1.5)
BILIRUB SERPL-MCNC: 0.4 MG/DL (ref 0–1.2)
BUN SERPL-MCNC: 17 MG/DL (ref 6–20)
BUN/CREAT SERPL: 22.7 (ref 7–25)
CALCIUM SPEC-SCNC: 9.6 MG/DL (ref 8.6–10.5)
CHLORIDE SERPL-SCNC: 103 MMOL/L (ref 98–107)
CO2 SERPL-SCNC: 25.7 MMOL/L (ref 22–29)
CREAT SERPL-MCNC: 0.75 MG/DL (ref 0.57–1)
DEPRECATED RDW RBC AUTO: 39.1 FL (ref 37–54)
EOSINOPHIL # BLD AUTO: 0.16 10*3/MM3 (ref 0–0.4)
EOSINOPHIL NFR BLD AUTO: 2.5 % (ref 0.3–6.2)
ERYTHROCYTE [DISTWIDTH] IN BLOOD BY AUTOMATED COUNT: 12.1 % (ref 12.3–15.4)
FERRITIN SERPL-MCNC: 47.15 NG/ML (ref 13–150)
GFR SERPL CREATININE-BSD FRML MDRD: 87 ML/MIN/1.73
GLOBULIN UR ELPH-MCNC: 3.2 GM/DL
GLUCOSE SERPL-MCNC: 112 MG/DL (ref 65–99)
HCT VFR BLD AUTO: 46.1 % (ref 34–46.6)
HGB BLD-MCNC: 15.7 G/DL (ref 12–15.9)
IMM GRANULOCYTES # BLD AUTO: 0.02 10*3/MM3 (ref 0–0.05)
IMM GRANULOCYTES NFR BLD AUTO: 0.3 % (ref 0–0.5)
IRON 24H UR-MRATE: 81 MCG/DL (ref 37–145)
IRON SATN MFR SERPL: 21 % (ref 20–50)
LYMPHOCYTES # BLD AUTO: 3.2 10*3/MM3 (ref 0.7–3.1)
LYMPHOCYTES NFR BLD AUTO: 49.5 % (ref 19.6–45.3)
MCH RBC QN AUTO: 30.1 PG (ref 26.6–33)
MCHC RBC AUTO-ENTMCNC: 34.1 G/DL (ref 31.5–35.7)
MCV RBC AUTO: 88.3 FL (ref 79–97)
MONOCYTES # BLD AUTO: 0.46 10*3/MM3 (ref 0.1–0.9)
MONOCYTES NFR BLD AUTO: 7.1 % (ref 5–12)
NEUTROPHILS NFR BLD AUTO: 2.57 10*3/MM3 (ref 1.7–7)
NEUTROPHILS NFR BLD AUTO: 39.8 % (ref 42.7–76)
NRBC BLD AUTO-RTO: 0 /100 WBC (ref 0–0.2)
PLATELET # BLD AUTO: 204 10*3/MM3 (ref 140–450)
PMV BLD AUTO: 10 FL (ref 6–12)
POTASSIUM SERPL-SCNC: 4.3 MMOL/L (ref 3.5–5.2)
PROT SERPL-MCNC: 7.8 G/DL (ref 6–8.5)
RBC # BLD AUTO: 5.22 10*6/MM3 (ref 3.77–5.28)
SODIUM SERPL-SCNC: 138 MMOL/L (ref 136–145)
TIBC SERPL-MCNC: 390 MCG/DL (ref 298–536)
TRANSFERRIN SERPL-MCNC: 262 MG/DL (ref 200–360)
WBC # BLD AUTO: 6.46 10*3/MM3 (ref 3.4–10.8)

## 2021-07-14 PROCEDURE — 84466 ASSAY OF TRANSFERRIN: CPT

## 2021-07-14 PROCEDURE — 82728 ASSAY OF FERRITIN: CPT

## 2021-07-14 PROCEDURE — 36415 COLL VENOUS BLD VENIPUNCTURE: CPT

## 2021-07-14 PROCEDURE — 85025 COMPLETE CBC W/AUTO DIFF WBC: CPT

## 2021-07-14 PROCEDURE — 80053 COMPREHEN METABOLIC PANEL: CPT

## 2021-07-14 PROCEDURE — 83540 ASSAY OF IRON: CPT

## 2021-07-16 ENCOUNTER — OFFICE VISIT (OUTPATIENT)
Dept: ONCOLOGY | Facility: CLINIC | Age: 36
End: 2021-07-16

## 2021-07-16 VITALS
BODY MASS INDEX: 37.25 KG/M2 | SYSTOLIC BLOOD PRESSURE: 101 MMHG | TEMPERATURE: 97.7 F | WEIGHT: 202 LBS | DIASTOLIC BLOOD PRESSURE: 74 MMHG | HEART RATE: 79 BPM

## 2021-07-16 DIAGNOSIS — E83.110 HEREDITARY HEMOCHROMATOSIS (HCC): Primary | ICD-10-CM

## 2021-07-16 PROCEDURE — 99213 OFFICE O/P EST LOW 20 MIN: CPT | Performed by: NURSE PRACTITIONER

## 2021-07-16 NOTE — PROGRESS NOTES
"CHIEF COMPLAINT: Management of hemochromatosis.    Problem List:  Oncology/Hematology History Overview Note   1. 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  No other significant upper abdominal pathology. Liver otherwise appears unremarkable.   2. History of hysterectomy.   3. History of neurocardiogenic syncope status post cardiac ablation by Dr. Gentile.   4.  Passage of kidney stone with retrieval July 2016  5.  Diastolic hypertension  6.  Degenerative arthritis felt to be related to hemochromatosis according to pathology     Hemochromatosis   5/1/2007 Initial Diagnosis    Hemochromatosis     9/27/2017 Imaging    MRI liver iron quantitation  Impression:     Moderate-to-severe iron deposition in the liver measuring  280 umol per gram with 300 umol per gram defining \"major iron overload\".  In May 2016 the measurement was 270 umol per gram.             10/17/2017 Imaging    CT of the abdomen and pelvis with contrast: Changes of mild hepato-steatosis.  Rim-enhancing lesion within the right ovary possibly representing a resolving cyst.         HISTORY OF PRESENT ILLNESS:  The patient is a 35 y.o. female, here for follow up on management of hemochromatosis.  She had recent labs.  Migraines are stable. No recent rashes.  She has started a new diet and has lost 3lbs. She got  in April and is very happy.   Energy is better. She has started working out. She is going camping with her family today.         Past Medical History:   Diagnosis Date   • Abnormal uterine bleeding (AUB)    • Atrial fibrillation (CMS/HCC)    • Calcium deposits of brain     on MRI   • Cardiac abnormality    • Depression 12/16/2016   • Dysplasia of cervix    • Fibromyalgia 12/16/2016    probable   • Gestational diabetes     Class A1-Diet Controlled   • Hemochromatosis    • " Hypertension 12/16/2016   • Inappropriate sinus tachycardia 12/16/2016    Electrophysiology study with radiofrequency ablation, June 2011 at [facility] in [place], [place], no data.   Echocardiogram February 2016: EF 60% to 65%, impaired relaxation noted. Trace MR, trace TR.    • Kidney stone    • Migraines    • Monospot test positive     History of positive Monospot.     • Nephrolithiasis    • Neurocardiogenic syncope     with tachycardia. Pt reports she had cardiac ablation in 2011.   • Steatohepatitis      Past Surgical History:   Procedure Laterality Date   • CARDIAC ABLATION  2011    bypass tract ablation   • CERVICAL CONIZATION, LEEP  2005    MODERATE DYSPLASIA   • CYSTOSCOPY URETEROSCOPY Left 7/10/2016    Procedure: CYSTOSCOPY, LEFT URETEROSCOPY, STONE EXTRACTION, LEFT URETERAL STENT INSERTION UNDER FLUROSCOPY;  Surgeon: Bernardo Simental MD;  Location: [facility];  Service:    • DILATATION AND CURETTAGE  2010   • TOTAL LAPAROSCOPIC HYSTERECTOMY Bilateral 08/19/2013    SALPINGECTOMY       No Known Allergies    Family History and Social History reviewed and changed as necessary      REVIEW OF SYSTEM:   Review of Systems   Constitutional: Negative for appetite change, chills, diaphoresis, fatigue, fever and unexpected weight change.   HENT:   Negative for mouth sores, sore throat and trouble swallowing.    Eyes: Negative for icterus.   Respiratory: Negative for cough, hemoptysis and shortness of breath.    Cardiovascular: Negative for chest pain, leg swelling and palpitations.   Gastrointestinal: Negative for abdominal distention, abdominal pain, blood in stool, constipation, diarrhea, nausea and vomiting.   Endocrine: Negative for hot flashes.   Genitourinary: Negative for bladder incontinence, difficulty urinating, dysuria, frequency and hematuria.    Musculoskeletal: Negative for gait problem, neck pain and neck stiffness.   Skin: Negative for rash.   Neurological: Negative for dizziness, gait  problem, headaches, light-headedness and numbness.   Hematological: Negative for adenopathy. Does not bruise/bleed easily.   Psychiatric/Behavioral: Negative for depression. The patient is not nervous/anxious.    All other systems reviewed and are negative.       PHYSICAL EXAM    Vitals:    07/16/21 1105   BP: 101/74   Pulse: 79   Temp: 97.7 °F (36.5 °C)   Weight: 91.6 kg (202 lb)     Constitutional: Appears well-developed and well-nourished. No distress.   ECOG: (0) Fully active, able to carry on all predisease performance without restriction  HENT:   Head: Normocephalic.  Malar rash unchanged  Mouth/Throat: Oropharynx is clear and moist.   Eyes: Conjunctivae are normal. Pupils are equal, round, and reactive to light. No scleral icterus.   Neck: Neck supple. No JVD present. No thyromegaly present.   Cardiovascular: Normal rate, regular rhythm and normal heart sounds.    Pulmonary/Chest: Breath sounds normal. No respiratory distress.   Abdominal: Soft. Exhibits no distension and no mass. There is no hepatosplenomegaly. There is no tenderness. There is no rebound and no guarding.   Musculoskeletal:Exhibits no edema, tenderness or deformity.   Neurological: Alert and oriented to person, place, and time. Exhibits normal muscle tone.   Skin: No ecchymosis, no petechiae and no rash noted. Not diaphoretic. No cyanosis. Nails show no clubbing.   Psychiatric: Normal mood and affect.   Vitals reviewed.      No radiology results for the last 7 days       ASSESSMENT & PLAN:    1.  Compound heterozygous hemochromatosis  2. Vasovagal syncope  3. Diastolic hypertension  4. Malar rash with arthritis    Discussion: She has not had a phlebotomy since November 2017. We discussed labs showed ferritin is 47.  CBC and CMP are stable and unchanged.  We discussed we will hold phlebotomy today.  We will tentatively plan to check ferritin levels in 6 months. If ferritin is above 50 in 6 months we will tentatively plan for a phlebotomy at  next visit.  We will continue to hold phlebotomies until her ferritin level is above 50.  Normally below 100 is our target, however, rheumatology believes her hemachromatosis is likely the source of her joint aches.  We will see her back in 6 months with blood counts and iron panel and CMP prior to return.     Obesity.  I encouraged the patient to continue to exercise and continue watching her caloric intake.  She has lost about 2lbs.    She will continue to follow up with rheumatology prn.         Flaquita Anderson, APRN  07/16/21

## 2021-08-28 DIAGNOSIS — K21.9 GASTROESOPHAGEAL REFLUX DISEASE: ICD-10-CM

## 2021-08-30 RX ORDER — OMEPRAZOLE 20 MG/1
CAPSULE, DELAYED RELEASE ORAL
Qty: 30 CAPSULE | Refills: 5 | Status: SHIPPED | OUTPATIENT
Start: 2021-08-30 | End: 2022-03-02

## 2021-09-11 ENCOUNTER — HOSPITAL ENCOUNTER (EMERGENCY)
Facility: HOSPITAL | Age: 36
Discharge: HOME | End: 2021-09-11
Attending: EMERGENCY MEDICINE | Admitting: EMERGENCY MEDICINE

## 2021-09-11 ENCOUNTER — APPOINTMENT (OUTPATIENT)
Dept: GENERAL RADIOLOGY | Facility: HOSPITAL | Age: 36
End: 2021-09-11

## 2021-09-11 VITALS
SYSTOLIC BLOOD PRESSURE: 113 MMHG | HEIGHT: 62 IN | RESPIRATION RATE: 18 BRPM | TEMPERATURE: 98 F | HEART RATE: 102 BPM | OXYGEN SATURATION: 98 % | WEIGHT: 200 LBS | BODY MASS INDEX: 36.8 KG/M2 | DIASTOLIC BLOOD PRESSURE: 93 MMHG

## 2021-09-11 DIAGNOSIS — R53.83 OTHER FATIGUE: Primary | ICD-10-CM

## 2021-09-11 LAB
ALBUMIN SERPL-MCNC: 4.3 G/DL (ref 3.5–5.2)
ALBUMIN/GLOB SERPL: 1.5 G/DL
ALP SERPL-CCNC: 64 U/L (ref 39–117)
ALT SERPL W P-5'-P-CCNC: 18 U/L (ref 1–33)
ANION GAP SERPL CALCULATED.3IONS-SCNC: 9.6 MMOL/L (ref 5–15)
AST SERPL-CCNC: 14 U/L (ref 1–32)
BASOPHILS # BLD AUTO: 0.07 10*3/MM3 (ref 0–0.2)
BASOPHILS NFR BLD AUTO: 0.7 % (ref 0–1.5)
BILIRUB SERPL-MCNC: <0.2 MG/DL (ref 0–1.2)
BUN SERPL-MCNC: 20 MG/DL (ref 6–20)
BUN/CREAT SERPL: 27 (ref 7–25)
CALCIUM SPEC-SCNC: 9.3 MG/DL (ref 8.6–10.5)
CHLORIDE SERPL-SCNC: 106 MMOL/L (ref 98–107)
CO2 SERPL-SCNC: 24.4 MMOL/L (ref 22–29)
CREAT SERPL-MCNC: 0.74 MG/DL (ref 0.57–1)
DEPRECATED RDW RBC AUTO: 39.4 FL (ref 37–54)
EOSINOPHIL # BLD AUTO: 0.09 10*3/MM3 (ref 0–0.4)
EOSINOPHIL NFR BLD AUTO: 0.9 % (ref 0.3–6.2)
ERYTHROCYTE [DISTWIDTH] IN BLOOD BY AUTOMATED COUNT: 12.2 % (ref 12.3–15.4)
GFR SERPL CREATININE-BSD FRML MDRD: 89 ML/MIN/1.73
GLOBULIN UR ELPH-MCNC: 2.8 GM/DL
GLUCOSE SERPL-MCNC: 114 MG/DL (ref 65–99)
HCT VFR BLD AUTO: 42.5 % (ref 34–46.6)
HGB BLD-MCNC: 14.7 G/DL (ref 12–15.9)
IMM GRANULOCYTES # BLD AUTO: 0.03 10*3/MM3 (ref 0–0.05)
IMM GRANULOCYTES NFR BLD AUTO: 0.3 % (ref 0–0.5)
LYMPHOCYTES # BLD AUTO: 4.56 10*3/MM3 (ref 0.7–3.1)
LYMPHOCYTES NFR BLD AUTO: 47.2 % (ref 19.6–45.3)
MCH RBC QN AUTO: 30.2 PG (ref 26.6–33)
MCHC RBC AUTO-ENTMCNC: 34.6 G/DL (ref 31.5–35.7)
MCV RBC AUTO: 87.4 FL (ref 79–97)
MONOCYTES # BLD AUTO: 0.72 10*3/MM3 (ref 0.1–0.9)
MONOCYTES NFR BLD AUTO: 7.4 % (ref 5–12)
NEUTROPHILS NFR BLD AUTO: 4.2 10*3/MM3 (ref 1.7–7)
NEUTROPHILS NFR BLD AUTO: 43.5 % (ref 42.7–76)
NRBC BLD AUTO-RTO: 0 /100 WBC (ref 0–0.2)
PLATELET # BLD AUTO: 209 10*3/MM3 (ref 140–450)
PMV BLD AUTO: 9.6 FL (ref 6–12)
POTASSIUM SERPL-SCNC: 3.4 MMOL/L (ref 3.5–5.2)
PROT SERPL-MCNC: 7.1 G/DL (ref 6–8.5)
RBC # BLD AUTO: 4.86 10*6/MM3 (ref 3.77–5.28)
SARS-COV-2 RNA PNL SPEC NAA+PROBE: NOT DETECTED
SODIUM SERPL-SCNC: 140 MMOL/L (ref 136–145)
WBC # BLD AUTO: 9.67 10*3/MM3 (ref 3.4–10.8)

## 2021-09-11 PROCEDURE — 87635 SARS-COV-2 COVID-19 AMP PRB: CPT | Performed by: PHYSICIAN ASSISTANT

## 2021-09-11 PROCEDURE — 85025 COMPLETE CBC W/AUTO DIFF WBC: CPT | Performed by: PHYSICIAN ASSISTANT

## 2021-09-11 PROCEDURE — 71045 X-RAY EXAM CHEST 1 VIEW: CPT

## 2021-09-11 PROCEDURE — 93005 ELECTROCARDIOGRAM TRACING: CPT | Performed by: EMERGENCY MEDICINE

## 2021-09-11 PROCEDURE — 99283 EMERGENCY DEPT VISIT LOW MDM: CPT

## 2021-09-11 PROCEDURE — 80053 COMPREHEN METABOLIC PANEL: CPT | Performed by: PHYSICIAN ASSISTANT

## 2021-09-11 NOTE — ED PROVIDER NOTES
Subjective   35-year-old female presents with multiple complaints, she said fatigue, fever, nausea vomiting for 3 to 4 days.  She states she noticed that she began to get sick after staying the night at a hotel and waking up with multiple bug bites.  She was treated with antibiotics and a steroid shot by her primary care physician and was told it was bug bites.  She still continues to have the symptoms and is not improving.      History provided by:  Patient   used: No        Review of Systems   Constitutional: Positive for fatigue.   Gastrointestinal: Positive for nausea and vomiting.   Neurological: Positive for weakness.   All other systems reviewed and are negative.      Past Medical History:   Diagnosis Date   • Abnormal uterine bleeding (AUB)    • Atrial fibrillation (CMS/HCC)    • Calcium deposits of brain     on MRI   • Cardiac abnormality    • Depression 12/16/2016   • Dysplasia of cervix    • Fibromyalgia 12/16/2016    probable   • Gestational diabetes     Class A1-Diet Controlled   • Hemochromatosis    • Hypertension 12/16/2016   • Inappropriate sinus tachycardia 12/16/2016    Electrophysiology study with radiofrequency ablation, June 2011 at [facility] in [place], [place], no data.   Echocardiogram February 2016: EF 60% to 65%, impaired relaxation noted. Trace MR, trace TR.    • Kidney stone    • Migraines    • Monospot test positive     History of positive Monospot.     • Nephrolithiasis    • Neurocardiogenic syncope     with tachycardia. Pt reports she had cardiac ablation in 2011.   • Steatohepatitis        No Known Allergies    Past Surgical History:   Procedure Laterality Date   • CARDIAC ABLATION  2011    bypass tract ablation   • CERVICAL CONIZATION, LEEP  2005    MODERATE DYSPLASIA   • CYSTOSCOPY URETEROSCOPY Left 7/10/2016    Procedure: CYSTOSCOPY, LEFT URETEROSCOPY, STONE EXTRACTION, LEFT URETERAL STENT INSERTION UNDER FLUROSCOPY;  Surgeon: Bernardo Simental MD;   Location: [facility] OR;  Service:    • DILATATION AND CURETTAGE  2010   • TOTAL LAPAROSCOPIC HYSTERECTOMY Bilateral 08/19/2013    SALPINGECTOMY       Family History   Problem Relation Age of Onset   • Colon cancer Paternal Grandmother    • Heart disease Mother    • Depression Mother    • Migraines Mother    • Hypertension Father    • Diabetes Father    • Hemochromatosis Father    • Hyperlipidemia Father    • Bradycardia Brother        Social History     Socioeconomic History   • Marital status:      Spouse name: Not on file   • Number of children: Not on file   • Years of education: Not on file   • Highest education level: Not on file   Tobacco Use   • Smoking status: Never Smoker   • Smokeless tobacco: Never Used   Substance and Sexual Activity   • Alcohol use: No   • Drug use: No   • Sexual activity: Yes     Partners: Male     Birth control/protection: Surgical           Objective   Physical Exam  Vitals and nursing note reviewed.   Constitutional:       Appearance: She is well-developed.   HENT:      Head: Normocephalic and atraumatic.   Cardiovascular:      Rate and Rhythm: Normal rate and regular rhythm.   Pulmonary:      Effort: Pulmonary effort is normal.      Breath sounds: Normal breath sounds.   Abdominal:      General: Bowel sounds are normal.      Palpations: Abdomen is soft.   Musculoskeletal:         General: Normal range of motion.      Cervical back: Normal range of motion and neck supple.   Skin:     Comments: Small lesions to the left hand left leg and left side of face   Neurological:      General: No focal deficit present.      Mental Status: She is alert.      Deep Tendon Reflexes: Reflexes are normal and symmetric.         Procedures           ED Course  ED Course as of Sep 11 1614   Sat Sep 11, 2021   1500 EKG interpreted by me reveals sinus rhythm rate of 88.  No ectopy no ischemic changes    [PF]      ED Course User Index  [PF] Yao Jung DO                                            MDM  Number of Diagnoses or Management Options     Amount and/or Complexity of Data Reviewed  Clinical lab tests: reviewed  Tests in the radiology section of CPT®: reviewed  Independent visualization of images, tracings, or specimens: yes    Risk of Complications, Morbidity, and/or Mortality  Presenting problems: minimal  Diagnostic procedures: minimal  Management options: minimal    Patient Progress  Patient progress: stable      Final diagnoses:   Other fatigue       ED Disposition  ED Disposition     ED Disposition Condition Comment    Discharge Stable           Rahat Mercedes MD  [address] [address]  [phone]    Schedule an appointment as soon as possible for a visit            Medication List      No changes were made to your prescriptions during this visit.          Mike Camp Jr., PA-C  09/11/21 9559

## 2021-10-25 NOTE — PROGRESS NOTES
[facility] - Office Note  Cristina Haines         [address] [place] [address]  1985   [phone] (home)        Location:  [facility].  Visit Type: Follow Up.    10/28/21     PCP:  Rahat Mercedes MD    Identification:  Cristina Haines is a 35 y.o.  female  currently employed.      Chief Complaint: FU on Vasovagal syncope, IAST, HTN    PROBLEM LIST/PMHx:   1.  Vasovagal syncope:    A.  Echocardiogram, 04/12/2007, showing trace MR, TR, EF 60%.   B.  Echocardiogram, March 2008: Normal LV size and function.    C.  Echocardiogram, November 2013: EF 60% to 65%, trace MR.   2.  Inappropriate sinus tachycardia:    A.  Electrophysiology study with radiofrequency ablation, June 2011 at [facility] in [place], [place], no data.    B.  Echocardiogram February 2016: EF 60% to 65%, impaired relaxation noted. Trace MR, trace TR.  3.  Essential Hypertension.    4.  Migraine headaches.   5.  Probable fibromyalgia.   6.  Hereditary hemochromatosis, status post liver biopsy.   7.  Depression.   8.  Dyslipidemia  9.  Status post hysterectomy, August 2013.          No Known Allergies      Current Outpatient Medications:   •  bisoprolol (ZEBeta) 10 MG tablet, Take 1 tablet by mouth Daily., Disp: 30 tablet, Rfl: 11  •  cetirizine (zyrTEC) 10 MG tablet, Take 10 mg by mouth Daily., Disp: , Rfl:   •  cholecalciferol (VITAMIN D3) 25 MCG (1000 UT) tablet, Take 1,000 Units by mouth Daily., Disp: , Rfl:   •  desvenlafaxine (PRISTIQ) 100 MG 24 hr tablet, TAKE ONE TABLET BY MOUTH EVERY DAY, Disp: 30 tablet, Rfl: 4  •  Melatonin 10 MG tablet, Take 1 tablet by mouth At Night As Needed., Disp: , Rfl:   •  Multiple Vitamin (MULTI-VITAMIN DAILY PO), Take 1 tablet by mouth Daily As Needed., Disp: , Rfl:   •  omeprazole (priLOSEC) 20 MG capsule, TAKE ONE CAPSULE BY MOUTH EVERY DAY, Disp: 30 capsule, Rfl: 5    HPI  Cristina Haines is a 36 yo CF with PMHx of IAST, syncope, HTN  "who is here today for her annual follow up.  She is doing quite well from a cardiac standpoint.  She has had no episodes of syncope or pre syncope, no weakness, no palpitations or irregular heart beats.  She does notice that in times of stress her BP will rise. She had an episode a few weeks ago where BP was quite high - stress management allowed BP to return to baseline.  She is doing well with medications - no need for refills at this time.            The following portions of the patient's history were reviewed in the chart and updated as appropriate: allergies, current medications, past family history, past medical history, past social history, past surgical history and problem list.    Review of Systems   Constitutional: Positive for weight gain. Negative for malaise/fatigue.   Cardiovascular: Negative for chest pain, dyspnea on exertion, irregular heartbeat, leg swelling and palpitations.   Respiratory: Negative for cough and shortness of breath.    Neurological: Negative for dizziness, headaches, light-headedness and loss of balance.   Psychiatric/Behavioral: Negative for altered mental status, depression and hallucinations. The patient does not have insomnia.    All other systems reviewed and are negative.            height is 157.5 cm (62.01\") and weight is 93.9 kg (207 lb). Her blood pressure is 112/84 and her pulse is 87. Her oxygen saturation is 98%.   Vitals and nursing note reviewed.   Constitutional:       Appearance: Normal appearance. Well-developed.   Pulmonary:      Effort: Pulmonary effort is normal.      Breath sounds: Normal breath sounds. No wheezing. No rhonchi. No rales.   Cardiovascular:      Normal rate. Regular rhythm.   Pulses:     Intact distal pulses.   Abdominal:      General: Bowel sounds are normal.      Palpations: Abdomen is soft.   Neurological:      Mental Status: Alert.           Procedures     Assessment/ Plan   Diagnoses and all orders for this visit:    1. Inappropriate " sinus tachycardia (Primary):  Patient is doing well and reports no episodes of tachycardia or palpitations.  Continue with Zebeta and RTC 1 year with EKG or sooner PRN.    2. Primary hypertension:  Controlled.  We discussed BP rising with stressful situations.  She is trying self care management at this time.  She knows to call if BP consistently runs high.     3. Vasovagal syncope:  No further recurrences.  Continue current medical regimen.  Continue hydration.         Ila Vergara PA-C functioning independently.  10/28/2021 13:16 EDT

## 2021-10-28 ENCOUNTER — OFFICE VISIT (OUTPATIENT)
Dept: CARDIOLOGY | Facility: CLINIC | Age: 36
End: 2021-10-28

## 2021-10-28 VITALS
BODY MASS INDEX: 38.09 KG/M2 | DIASTOLIC BLOOD PRESSURE: 84 MMHG | HEART RATE: 87 BPM | HEIGHT: 62 IN | WEIGHT: 207 LBS | SYSTOLIC BLOOD PRESSURE: 112 MMHG | OXYGEN SATURATION: 98 %

## 2021-10-28 DIAGNOSIS — R55 VASOVAGAL SYNCOPE: ICD-10-CM

## 2021-10-28 DIAGNOSIS — I10 PRIMARY HYPERTENSION: ICD-10-CM

## 2021-10-28 DIAGNOSIS — R00.0 INAPPROPRIATE SINUS TACHYCARDIA: Primary | ICD-10-CM

## 2021-10-28 PROCEDURE — 99214 OFFICE O/P EST MOD 30 MIN: CPT | Performed by: PHYSICIAN ASSISTANT

## 2021-11-05 ENCOUNTER — OFFICE VISIT (OUTPATIENT)
Dept: OBSTETRICS AND GYNECOLOGY | Facility: CLINIC | Age: 36
End: 2021-11-05

## 2021-11-05 VITALS
SYSTOLIC BLOOD PRESSURE: 110 MMHG | WEIGHT: 205 LBS | RESPIRATION RATE: 16 BRPM | DIASTOLIC BLOOD PRESSURE: 60 MMHG | BODY MASS INDEX: 37.49 KG/M2

## 2021-11-05 DIAGNOSIS — N63.10 LUMP OF RIGHT BREAST: Primary | ICD-10-CM

## 2021-11-05 PROCEDURE — 99212 OFFICE O/P EST SF 10 MIN: CPT | Performed by: NURSE PRACTITIONER

## 2021-11-05 NOTE — PROGRESS NOTES
Problem Visit     Patient Name: Cristina Haines  : 1985   MRN: [id number]   Care Team: Patient Care Team:  Rahat Mercedes MD as PCP - General  Rahat Mercedes MD as PCP - Family Medicine  Yonas Pavon MD as Consulting Physician (Hematology and Oncology)  Jared Spence MD as Consulting Physician (Gastroenterology)  Dashawn Gentile MD as Consulting Physician (Cardiology)    Chief Complaint:    Chief Complaint   Patient presents with   • Breast Mass     right breast       HPI: Cristina Haines is a 35 y.o. year old  presenting to be seen with right breast lump noted on SBE.   AV done with WNL CBE in 2021   Has never had breast imaging before   No family hx of breast cancer     States she noticed breast tenderness and when performing a breast exam, noted a lump in the right breast   It is unchanged from a few days ago when she first felt it -      S/p total hyst - ovaries remain       Subjective      /60   Resp 16   Wt 93 kg (205 lb)   LMP  (LMP Unknown)   Breastfeeding No   BMI 37.49 kg/m²     BMI reviewed: Body mass index is 37.49 kg/m².      Objective     Physical Exam      Neuro: alert and oriented to person, place and time   General:  alert; cooperative; well developed; well nourished   Skin:  Not performed.   Thyroid: not examined   Lungs:  breathing is unlabored   Heart:  Not performed.   Breasts:  Examined in supine position  Nipples normal without inversion, lesions or discharge  There are no palpable axillary nodes  Fibrocystic changes are present both breasts without a discrete mass  approx 1cm size lump in right breast at 3 o'clock approx 8cm from areolar border, mobile, and tender   Abdomen: Not performed.   Pelvis: Not performed.         Assessment / Plan      Assessment  Problems Addressed This Visit    ICD-10-CM ICD-9-CM   1. Lump of right breast  N63.10 611.72       Plan    Discussed CBE findings   Fibrocystic changes noted  bilaterally   Will order dx mammogram of right breast, ultrasound if needed, for further evaluation   Continue monthly SBEs   Will f/u with CBE when annual due in Jan 2022   Will call to discuss breast imaging results and f/u recommendations             Follow Up  Return in about 3 months (around 1/31/2022) for Annual physical.  Patient was given instructions and counseling regarding her condition or for health maintenance advice. Please see specific information pulled into the AVS if appropriate.     Anny Chambers, VERITO  November 5, 2021  13:48 EDT

## 2021-11-08 ENCOUNTER — TELEPHONE (OUTPATIENT)
Dept: OBSTETRICS AND GYNECOLOGY | Facility: CLINIC | Age: 36
End: 2021-11-08

## 2021-11-08 NOTE — TELEPHONE ENCOUNTER
Pt called to schedule a appointment  for the spot , that Doctor Reyes so, they said they couldn't see her until December 22nd. Pt wants to know if a urgent order can be put in to be seen sooner.

## 2021-11-24 ENCOUNTER — APPOINTMENT (OUTPATIENT)
Dept: GENERAL RADIOLOGY | Facility: HOSPITAL | Age: 36
End: 2021-11-24

## 2021-11-24 ENCOUNTER — HOSPITAL ENCOUNTER (EMERGENCY)
Facility: HOSPITAL | Age: 36
Discharge: HOME | End: 2021-11-24
Attending: EMERGENCY MEDICINE | Admitting: EMERGENCY MEDICINE

## 2021-11-24 VITALS
HEIGHT: 62 IN | RESPIRATION RATE: 16 BRPM | DIASTOLIC BLOOD PRESSURE: 87 MMHG | SYSTOLIC BLOOD PRESSURE: 109 MMHG | WEIGHT: 200 LBS | HEART RATE: 99 BPM | TEMPERATURE: 98.3 F | OXYGEN SATURATION: 98 % | BODY MASS INDEX: 36.8 KG/M2

## 2021-11-24 DIAGNOSIS — S93.601A FOOT SPRAIN, RIGHT, INITIAL ENCOUNTER: Primary | ICD-10-CM

## 2021-11-24 PROCEDURE — 63710000001 ONDANSETRON ODT 4 MG TABLET DISPERSIBLE

## 2021-11-24 PROCEDURE — 73590 X-RAY EXAM OF LOWER LEG: CPT

## 2021-11-24 PROCEDURE — 99283 EMERGENCY DEPT VISIT LOW MDM: CPT

## 2021-11-24 PROCEDURE — 73630 X-RAY EXAM OF FOOT: CPT

## 2021-11-24 RX ORDER — HYDROCODONE BITARTRATE AND ACETAMINOPHEN 5; 325 MG/1; MG/1
1 TABLET ORAL ONCE
Status: COMPLETED | OUTPATIENT
Start: 2021-11-24 | End: 2021-11-24

## 2021-11-24 RX ORDER — ONDANSETRON 4 MG/1
4 TABLET, ORALLY DISINTEGRATING ORAL ONCE
Status: COMPLETED | OUTPATIENT
Start: 2021-11-24 | End: 2021-11-24

## 2021-11-24 RX ORDER — TRAMADOL HYDROCHLORIDE 50 MG/1
50 TABLET ORAL EVERY 6 HOURS PRN
Qty: 12 TABLET | Refills: 0 | Status: SHIPPED | OUTPATIENT
Start: 2021-11-24 | End: 2022-03-31

## 2021-11-24 RX ORDER — ONDANSETRON 4 MG/1
TABLET, ORALLY DISINTEGRATING ORAL
Status: COMPLETED
Start: 2021-11-24 | End: 2021-11-24

## 2021-11-24 RX ADMIN — HYDROCODONE BITARTRATE AND ACETAMINOPHEN 1 TABLET: 5; 325 TABLET ORAL at 02:25

## 2021-11-24 RX ADMIN — ONDANSETRON 4 MG: 4 TABLET, ORALLY DISINTEGRATING ORAL at 02:03

## 2021-11-29 ENCOUNTER — APPOINTMENT (OUTPATIENT)
Dept: CT IMAGING | Facility: HOSPITAL | Age: 36
End: 2021-11-29

## 2021-11-29 ENCOUNTER — TELEPHONE (OUTPATIENT)
Dept: INTERNAL MEDICINE | Facility: CLINIC | Age: 36
End: 2021-11-29

## 2021-11-29 ENCOUNTER — APPOINTMENT (OUTPATIENT)
Dept: GENERAL RADIOLOGY | Facility: HOSPITAL | Age: 36
End: 2021-11-29

## 2021-11-29 ENCOUNTER — HOSPITAL ENCOUNTER (EMERGENCY)
Facility: HOSPITAL | Age: 36
Discharge: HOME | End: 2021-11-29
Attending: EMERGENCY MEDICINE | Admitting: EMERGENCY MEDICINE

## 2021-11-29 ENCOUNTER — PATIENT MESSAGE (OUTPATIENT)
Dept: INTERNAL MEDICINE | Facility: CLINIC | Age: 36
End: 2021-11-29

## 2021-11-29 VITALS
HEART RATE: 117 BPM | SYSTOLIC BLOOD PRESSURE: 117 MMHG | OXYGEN SATURATION: 96 % | DIASTOLIC BLOOD PRESSURE: 84 MMHG | TEMPERATURE: 99.1 F | HEIGHT: 62 IN | RESPIRATION RATE: 20 BRPM | WEIGHT: 200 LBS | BODY MASS INDEX: 36.8 KG/M2

## 2021-11-29 DIAGNOSIS — J12.82 PNEUMONIA DUE TO COVID-19 VIRUS: Primary | ICD-10-CM

## 2021-11-29 DIAGNOSIS — U07.1 PNEUMONIA DUE TO COVID-19 VIRUS: Primary | ICD-10-CM

## 2021-11-29 LAB
ALBUMIN SERPL-MCNC: 4.1 G/DL (ref 3.5–5.2)
ALBUMIN/GLOB SERPL: 1.3 G/DL
ALP SERPL-CCNC: 90 U/L (ref 39–117)
ALT SERPL W P-5'-P-CCNC: 35 U/L (ref 1–33)
ANION GAP SERPL CALCULATED.3IONS-SCNC: 14 MMOL/L (ref 5–15)
AST SERPL-CCNC: 31 U/L (ref 1–32)
B-HCG UR QL: NEGATIVE
BASOPHILS # BLD AUTO: 0.02 10*3/MM3 (ref 0–0.2)
BASOPHILS NFR BLD AUTO: 0.5 % (ref 0–1.5)
BILIRUB SERPL-MCNC: <0.2 MG/DL (ref 0–1.2)
BILIRUB UR QL STRIP: NEGATIVE
BUN SERPL-MCNC: 10 MG/DL (ref 6–20)
BUN/CREAT SERPL: 13.2 (ref 7–25)
CALCIUM SPEC-SCNC: 9.1 MG/DL (ref 8.6–10.5)
CHLORIDE SERPL-SCNC: 102 MMOL/L (ref 98–107)
CLARITY UR: CLEAR
CO2 SERPL-SCNC: 22 MMOL/L (ref 22–29)
COLOR UR: YELLOW
CREAT SERPL-MCNC: 0.76 MG/DL (ref 0.57–1)
CRP SERPL-MCNC: 1.12 MG/DL (ref 0–0.5)
D DIMER PPP FEU-MCNC: 0.75 MCGFEU/ML (ref 0–0.56)
D-LACTATE SERPL-SCNC: 2 MMOL/L (ref 0.5–2)
DEPRECATED RDW RBC AUTO: 41.1 FL (ref 37–54)
EOSINOPHIL # BLD AUTO: 0.05 10*3/MM3 (ref 0–0.4)
EOSINOPHIL NFR BLD AUTO: 1.2 % (ref 0.3–6.2)
ERYTHROCYTE [DISTWIDTH] IN BLOOD BY AUTOMATED COUNT: 12.5 % (ref 12.3–15.4)
ERYTHROCYTE [SEDIMENTATION RATE] IN BLOOD: 24 MM/HR (ref 0–20)
EXPIRATION DATE: NORMAL
GFR SERPL CREATININE-BSD FRML MDRD: 86 ML/MIN/1.73
GLOBULIN UR ELPH-MCNC: 3.2 GM/DL
GLUCOSE SERPL-MCNC: 123 MG/DL (ref 65–99)
GLUCOSE UR STRIP-MCNC: NEGATIVE MG/DL
HCT VFR BLD AUTO: 44.5 % (ref 34–46.6)
HGB BLD-MCNC: 15 G/DL (ref 12–15.9)
HGB UR QL STRIP.AUTO: NEGATIVE
HOLD SPECIMEN: NORMAL
IMM GRANULOCYTES # BLD AUTO: 0.01 10*3/MM3 (ref 0–0.05)
IMM GRANULOCYTES NFR BLD AUTO: 0.2 % (ref 0–0.5)
INTERNAL NEGATIVE CONTROL: NEGATIVE
INTERNAL POSITIVE CONTROL: POSITIVE
KETONES UR QL STRIP: NEGATIVE
LDH SERPL-CCNC: 167 U/L (ref 135–214)
LEUKOCYTE ESTERASE UR QL STRIP.AUTO: NEGATIVE
LYMPHOCYTES # BLD AUTO: 1.71 10*3/MM3 (ref 0.7–3.1)
LYMPHOCYTES NFR BLD AUTO: 39.9 % (ref 19.6–45.3)
Lab: NORMAL
MAGNESIUM SERPL-MCNC: 1.9 MG/DL (ref 1.6–2.6)
MCH RBC QN AUTO: 30.1 PG (ref 26.6–33)
MCHC RBC AUTO-ENTMCNC: 33.7 G/DL (ref 31.5–35.7)
MCV RBC AUTO: 89.4 FL (ref 79–97)
MONOCYTES # BLD AUTO: 0.58 10*3/MM3 (ref 0.1–0.9)
MONOCYTES NFR BLD AUTO: 13.5 % (ref 5–12)
NEUTROPHILS NFR BLD AUTO: 1.92 10*3/MM3 (ref 1.7–7)
NEUTROPHILS NFR BLD AUTO: 44.7 % (ref 42.7–76)
NITRITE UR QL STRIP: NEGATIVE
NRBC BLD AUTO-RTO: 0 /100 WBC (ref 0–0.2)
NT-PROBNP SERPL-MCNC: 5.7 PG/ML (ref 0–450)
PH UR STRIP.AUTO: 7.5 [PH] (ref 5–8)
PLATELET # BLD AUTO: 127 10*3/MM3 (ref 140–450)
PMV BLD AUTO: 10.1 FL (ref 6–12)
POTASSIUM SERPL-SCNC: 3.9 MMOL/L (ref 3.5–5.2)
PROCALCITONIN SERPL-MCNC: 0.07 NG/ML (ref 0–0.25)
PROT SERPL-MCNC: 7.3 G/DL (ref 6–8.5)
PROT UR QL STRIP: NEGATIVE
QT INTERVAL: 328 MS
QT INTERVAL: 372 MS
QTC INTERVAL: 427 MS
QTC INTERVAL: 437 MS
RBC # BLD AUTO: 4.98 10*6/MM3 (ref 3.77–5.28)
SODIUM SERPL-SCNC: 138 MMOL/L (ref 136–145)
SP GR UR STRIP: 1.02 (ref 1–1.03)
TROPONIN T SERPL-MCNC: <0.01 NG/ML (ref 0–0.03)
UROBILINOGEN UR QL STRIP: NORMAL
WBC NRBC COR # BLD: 4.29 10*3/MM3 (ref 3.4–10.8)
WHOLE BLOOD HOLD SPECIMEN: NORMAL
WHOLE BLOOD HOLD SPECIMEN: NORMAL

## 2021-11-29 PROCEDURE — 85379 FIBRIN DEGRADATION QUANT: CPT | Performed by: EMERGENCY MEDICINE

## 2021-11-29 PROCEDURE — 83735 ASSAY OF MAGNESIUM: CPT | Performed by: EMERGENCY MEDICINE

## 2021-11-29 PROCEDURE — 83615 LACTATE (LD) (LDH) ENZYME: CPT | Performed by: EMERGENCY MEDICINE

## 2021-11-29 PROCEDURE — 0 IOPAMIDOL PER 1 ML: Performed by: EMERGENCY MEDICINE

## 2021-11-29 PROCEDURE — 85025 COMPLETE CBC W/AUTO DIFF WBC: CPT | Performed by: EMERGENCY MEDICINE

## 2021-11-29 PROCEDURE — 84145 PROCALCITONIN (PCT): CPT | Performed by: EMERGENCY MEDICINE

## 2021-11-29 PROCEDURE — 84484 ASSAY OF TROPONIN QUANT: CPT | Performed by: EMERGENCY MEDICINE

## 2021-11-29 PROCEDURE — 71275 CT ANGIOGRAPHY CHEST: CPT

## 2021-11-29 PROCEDURE — 87040 BLOOD CULTURE FOR BACTERIA: CPT | Performed by: EMERGENCY MEDICINE

## 2021-11-29 PROCEDURE — 81003 URINALYSIS AUTO W/O SCOPE: CPT | Performed by: EMERGENCY MEDICINE

## 2021-11-29 PROCEDURE — 71045 X-RAY EXAM CHEST 1 VIEW: CPT

## 2021-11-29 PROCEDURE — 86140 C-REACTIVE PROTEIN: CPT | Performed by: EMERGENCY MEDICINE

## 2021-11-29 PROCEDURE — 99283 EMERGENCY DEPT VISIT LOW MDM: CPT

## 2021-11-29 PROCEDURE — 25010000002 DEXAMETHASONE PER 1 MG: Performed by: EMERGENCY MEDICINE

## 2021-11-29 PROCEDURE — 83605 ASSAY OF LACTIC ACID: CPT | Performed by: EMERGENCY MEDICINE

## 2021-11-29 PROCEDURE — 81025 URINE PREGNANCY TEST: CPT | Performed by: EMERGENCY MEDICINE

## 2021-11-29 PROCEDURE — 96374 THER/PROPH/DIAG INJ IV PUSH: CPT

## 2021-11-29 PROCEDURE — 93005 ELECTROCARDIOGRAM TRACING: CPT | Performed by: EMERGENCY MEDICINE

## 2021-11-29 PROCEDURE — 80053 COMPREHEN METABOLIC PANEL: CPT | Performed by: EMERGENCY MEDICINE

## 2021-11-29 PROCEDURE — 83880 ASSAY OF NATRIURETIC PEPTIDE: CPT | Performed by: EMERGENCY MEDICINE

## 2021-11-29 PROCEDURE — 36415 COLL VENOUS BLD VENIPUNCTURE: CPT

## 2021-11-29 PROCEDURE — 85652 RBC SED RATE AUTOMATED: CPT | Performed by: EMERGENCY MEDICINE

## 2021-11-29 RX ORDER — ACETAMINOPHEN 500 MG
1000 TABLET ORAL ONCE
Status: COMPLETED | OUTPATIENT
Start: 2021-11-29 | End: 2021-11-29

## 2021-11-29 RX ORDER — DEXAMETHASONE SODIUM PHOSPHATE 4 MG/ML
6 INJECTION, SOLUTION INTRA-ARTICULAR; INTRALESIONAL; INTRAMUSCULAR; INTRAVENOUS; SOFT TISSUE ONCE
Status: COMPLETED | OUTPATIENT
Start: 2021-11-29 | End: 2021-11-29

## 2021-11-29 RX ORDER — SODIUM CHLORIDE 0.9 % (FLUSH) 0.9 %
10 SYRINGE (ML) INJECTION AS NEEDED
Status: DISCONTINUED | OUTPATIENT
Start: 2021-11-29 | End: 2021-11-29 | Stop reason: HOSPADM

## 2021-11-29 RX ADMIN — ACETAMINOPHEN 1000 MG: 500 TABLET, FILM COATED ORAL at 11:00

## 2021-11-29 RX ADMIN — SODIUM CHLORIDE 1000 ML: 9 INJECTION, SOLUTION INTRAVENOUS at 11:00

## 2021-11-29 RX ADMIN — IOPAMIDOL 80 ML: 755 INJECTION, SOLUTION INTRAVENOUS at 12:20

## 2021-11-29 RX ADMIN — DEXAMETHASONE SODIUM PHOSPHATE 6 MG: 4 INJECTION, SOLUTION INTRA-ARTICULAR; INTRALESIONAL; INTRAMUSCULAR; INTRAVENOUS; SOFT TISSUE at 11:00

## 2021-11-29 NOTE — TELEPHONE ENCOUNTER
From: Cristina Haines  To: Rahat Mercedes MD  Sent: 11/29/2021 2:22 PM EST  Subject: Covid infusion     I went to the ER and am now discharged. They told me I didn’t need an order for the infusion but when I called to schedule, I was told I did need an order. I let the ER know and they’d said they sent an order but [facility] hasn’t received it. Can you send an order in for me? Their fax number is [phone]. Their phone number is [phone].    Sriram needs one also please. He has also tested positive. Thank you.

## 2021-11-29 NOTE — TELEPHONE ENCOUNTER
PATIENT CALLED AND STATED SHE TESTED POSITIVE FOR COVID ON Saturday 11/27/21.  SHE WAS SEEN AT AN URGENT TREATMENT CENTER IN [place] (NOT [facility])    SHE COMPLAINS OF FEVER .0, SHORTNESS OF AIR WITH A PULSE OX OF 85, DIZZINESS AND LOSS OF SMELL.    PATIENT WANTED TO COME TO OFFICE BUT ADVISED HER TO GO TO [facility].  SHE SAYS HER  IS TAKING HER TO HOSPITAL RIGHT NOW.    WANTED DR. AZAR TO KNOW.    [phone]

## 2021-12-01 ENCOUNTER — APPOINTMENT (OUTPATIENT)
Dept: CARDIOLOGY | Facility: HOSPITAL | Age: 36
End: 2021-12-01

## 2021-12-01 ENCOUNTER — HOSPITAL ENCOUNTER (INPATIENT)
Facility: HOSPITAL | Age: 36
LOS: 4 days | Discharge: HOME | End: 2021-12-05
Attending: EMERGENCY MEDICINE | Admitting: INTERNAL MEDICINE

## 2021-12-01 ENCOUNTER — TELEPHONE (OUTPATIENT)
Dept: CARDIOLOGY | Facility: CLINIC | Age: 36
End: 2021-12-01

## 2021-12-01 ENCOUNTER — APPOINTMENT (OUTPATIENT)
Dept: GENERAL RADIOLOGY | Facility: HOSPITAL | Age: 36
End: 2021-12-01

## 2021-12-01 DIAGNOSIS — E83.51 HYPOCALCEMIA: ICD-10-CM

## 2021-12-01 DIAGNOSIS — R55 SYNCOPE AND COLLAPSE: ICD-10-CM

## 2021-12-01 DIAGNOSIS — U07.1 PNEUMONIA DUE TO COVID-19 VIRUS: ICD-10-CM

## 2021-12-01 DIAGNOSIS — J12.82 PNEUMONIA DUE TO COVID-19 VIRUS: ICD-10-CM

## 2021-12-01 DIAGNOSIS — R09.02 HYPOXIA: ICD-10-CM

## 2021-12-01 DIAGNOSIS — R93.89 ABNORMAL CHEST X-RAY: ICD-10-CM

## 2021-12-01 DIAGNOSIS — U07.1 COVID-19: ICD-10-CM

## 2021-12-01 DIAGNOSIS — R11.2 NON-INTRACTABLE VOMITING WITH NAUSEA, UNSPECIFIED VOMITING TYPE: Primary | ICD-10-CM

## 2021-12-01 PROBLEM — R11.10 NON-INTRACTABLE VOMITING: Status: ACTIVE | Noted: 2021-12-01

## 2021-12-01 LAB
ALBUMIN SERPL-MCNC: 3.7 G/DL (ref 3.5–5.2)
ALBUMIN/GLOB SERPL: 1.5 G/DL
ALP SERPL-CCNC: 86 U/L (ref 39–117)
ALT SERPL W P-5'-P-CCNC: 26 U/L (ref 1–33)
ANION GAP SERPL CALCULATED.3IONS-SCNC: 11 MMOL/L (ref 5–15)
AST SERPL-CCNC: 29 U/L (ref 1–32)
BASOPHILS # BLD AUTO: 0.01 10*3/MM3 (ref 0–0.2)
BASOPHILS NFR BLD AUTO: 0.2 % (ref 0–1.5)
BILIRUB SERPL-MCNC: 0.2 MG/DL (ref 0–1.2)
BUN SERPL-MCNC: 9 MG/DL (ref 6–20)
BUN/CREAT SERPL: 11.4 (ref 7–25)
CALCIUM SPEC-SCNC: 8.1 MG/DL (ref 8.6–10.5)
CHLORIDE SERPL-SCNC: 102 MMOL/L (ref 98–107)
CO2 SERPL-SCNC: 22 MMOL/L (ref 22–29)
CREAT SERPL-MCNC: 0.79 MG/DL (ref 0.57–1)
CRP SERPL-MCNC: 2.62 MG/DL (ref 0–0.5)
D DIMER PPP FEU-MCNC: 0.8 MCGFEU/ML (ref 0–0.56)
D-LACTATE SERPL-SCNC: 1 MMOL/L (ref 0.5–2)
DEPRECATED RDW RBC AUTO: 39.8 FL (ref 37–54)
EOSINOPHIL # BLD AUTO: 0 10*3/MM3 (ref 0–0.4)
EOSINOPHIL NFR BLD AUTO: 0 % (ref 0.3–6.2)
ERYTHROCYTE [DISTWIDTH] IN BLOOD BY AUTOMATED COUNT: 12.3 % (ref 12.3–15.4)
GFR SERPL CREATININE-BSD FRML MDRD: 82 ML/MIN/1.73
GLOBULIN UR ELPH-MCNC: 2.5 GM/DL
GLUCOSE SERPL-MCNC: 104 MG/DL (ref 65–99)
HCT VFR BLD AUTO: 39.2 % (ref 34–46.6)
HGB BLD-MCNC: 13.6 G/DL (ref 12–15.9)
HOLD SPECIMEN: NORMAL
IMM GRANULOCYTES # BLD AUTO: 0.01 10*3/MM3 (ref 0–0.05)
IMM GRANULOCYTES NFR BLD AUTO: 0.2 % (ref 0–0.5)
LYMPHOCYTES # BLD AUTO: 1.29 10*3/MM3 (ref 0.7–3.1)
LYMPHOCYTES NFR BLD AUTO: 30.8 % (ref 19.6–45.3)
MAGNESIUM SERPL-MCNC: 1.7 MG/DL (ref 1.6–2.6)
MCH RBC QN AUTO: 30.5 PG (ref 26.6–33)
MCHC RBC AUTO-ENTMCNC: 34.7 G/DL (ref 31.5–35.7)
MCV RBC AUTO: 87.9 FL (ref 79–97)
MONOCYTES # BLD AUTO: 0.32 10*3/MM3 (ref 0.1–0.9)
MONOCYTES NFR BLD AUTO: 7.6 % (ref 5–12)
NEUTROPHILS NFR BLD AUTO: 2.56 10*3/MM3 (ref 1.7–7)
NEUTROPHILS NFR BLD AUTO: 61.2 % (ref 42.7–76)
NRBC BLD AUTO-RTO: 0 /100 WBC (ref 0–0.2)
PLAT MORPH BLD: NORMAL
PLATELET # BLD AUTO: 109 10*3/MM3 (ref 140–450)
PMV BLD AUTO: 9.9 FL (ref 6–12)
POTASSIUM SERPL-SCNC: 4.4 MMOL/L (ref 3.5–5.2)
PROCALCITONIN SERPL-MCNC: 0.04 NG/ML (ref 0–0.25)
PROT SERPL-MCNC: 6.2 G/DL (ref 6–8.5)
QT INTERVAL: 344 MS
QTC INTERVAL: 443 MS
RBC # BLD AUTO: 4.46 10*6/MM3 (ref 3.77–5.28)
RBC MORPH BLD: NORMAL
SARS-COV-2 RNA PNL SPEC NAA+PROBE: DETECTED
SODIUM SERPL-SCNC: 135 MMOL/L (ref 136–145)
TROPONIN T SERPL-MCNC: <0.01 NG/ML (ref 0–0.03)
WBC MORPH BLD: NORMAL
WBC NRBC COR # BLD: 4.19 10*3/MM3 (ref 3.4–10.8)
WHOLE BLOOD HOLD SPECIMEN: NORMAL
WHOLE BLOOD HOLD SPECIMEN: NORMAL

## 2021-12-01 PROCEDURE — 63710000001 DEXAMETHASONE PER 0.25 MG: Performed by: INTERNAL MEDICINE

## 2021-12-01 PROCEDURE — 80053 COMPREHEN METABOLIC PANEL: CPT | Performed by: EMERGENCY MEDICINE

## 2021-12-01 PROCEDURE — 84484 ASSAY OF TROPONIN QUANT: CPT | Performed by: EMERGENCY MEDICINE

## 2021-12-01 PROCEDURE — 83605 ASSAY OF LACTIC ACID: CPT | Performed by: EMERGENCY MEDICINE

## 2021-12-01 PROCEDURE — 83735 ASSAY OF MAGNESIUM: CPT | Performed by: EMERGENCY MEDICINE

## 2021-12-01 PROCEDURE — 86140 C-REACTIVE PROTEIN: CPT | Performed by: INTERNAL MEDICINE

## 2021-12-01 PROCEDURE — 63710000001 PROMETHAZINE PER 12.5 MG: Performed by: INTERNAL MEDICINE

## 2021-12-01 PROCEDURE — 25010000002 ONDANSETRON PER 1 MG: Performed by: INTERNAL MEDICINE

## 2021-12-01 PROCEDURE — 85379 FIBRIN DEGRADATION QUANT: CPT | Performed by: INTERNAL MEDICINE

## 2021-12-01 PROCEDURE — 85007 BL SMEAR W/DIFF WBC COUNT: CPT | Performed by: EMERGENCY MEDICINE

## 2021-12-01 PROCEDURE — 93005 ELECTROCARDIOGRAM TRACING: CPT | Performed by: EMERGENCY MEDICINE

## 2021-12-01 PROCEDURE — U0004 COV-19 TEST NON-CDC HGH THRU: HCPCS | Performed by: INTERNAL MEDICINE

## 2021-12-01 PROCEDURE — 25010000002 ONDANSETRON PER 1 MG: Performed by: EMERGENCY MEDICINE

## 2021-12-01 PROCEDURE — 25010000002 ENOXAPARIN PER 10 MG: Performed by: INTERNAL MEDICINE

## 2021-12-01 PROCEDURE — 25010000002 KETOROLAC TROMETHAMINE PER 15 MG: Performed by: INTERNAL MEDICINE

## 2021-12-01 PROCEDURE — 71045 X-RAY EXAM CHEST 1 VIEW: CPT

## 2021-12-01 PROCEDURE — 87040 BLOOD CULTURE FOR BACTERIA: CPT | Performed by: EMERGENCY MEDICINE

## 2021-12-01 PROCEDURE — 25010000002 PROCHLORPERAZINE 10 MG/2ML SOLUTION: Performed by: INTERNAL MEDICINE

## 2021-12-01 PROCEDURE — 99285 EMERGENCY DEPT VISIT HI MDM: CPT

## 2021-12-01 PROCEDURE — 99223 1ST HOSP IP/OBS HIGH 75: CPT | Performed by: INTERNAL MEDICINE

## 2021-12-01 PROCEDURE — 84145 PROCALCITONIN (PCT): CPT | Performed by: INTERNAL MEDICINE

## 2021-12-01 PROCEDURE — 25010000002 CEFTRIAXONE PER 250 MG: Performed by: EMERGENCY MEDICINE

## 2021-12-01 PROCEDURE — XW033E5 INTRODUCTION OF REMDESIVIR ANTI-INFECTIVE INTO PERIPHERAL VEIN, PERCUTANEOUS APPROACH, NEW TECHNOLOGY GROUP 5: ICD-10-PCS | Performed by: INTERNAL MEDICINE

## 2021-12-01 PROCEDURE — 85025 COMPLETE CBC W/AUTO DIFF WBC: CPT | Performed by: EMERGENCY MEDICINE

## 2021-12-01 RX ORDER — PROCHLORPERAZINE EDISYLATE 5 MG/ML
5 INJECTION INTRAMUSCULAR; INTRAVENOUS ONCE
Status: COMPLETED | OUTPATIENT
Start: 2021-12-01 | End: 2021-12-01

## 2021-12-01 RX ORDER — DESVENLAFAXINE SUCCINATE 50 MG/1
100 TABLET, EXTENDED RELEASE ORAL DAILY
Status: COMPLETED | OUTPATIENT
Start: 2021-12-01 | End: 2021-12-01

## 2021-12-01 RX ORDER — DEXTROMETHORPHAN POLISTIREX 30 MG/5ML
60 SUSPENSION ORAL EVERY 12 HOURS PRN
Status: DISCONTINUED | OUTPATIENT
Start: 2021-12-01 | End: 2021-12-05 | Stop reason: HOSPADM

## 2021-12-01 RX ORDER — SODIUM CHLORIDE 9 MG/ML
100 INJECTION, SOLUTION INTRAVENOUS CONTINUOUS
Status: DISCONTINUED | OUTPATIENT
Start: 2021-12-01 | End: 2021-12-02

## 2021-12-01 RX ORDER — KETOROLAC TROMETHAMINE 30 MG/ML
30 INJECTION, SOLUTION INTRAMUSCULAR; INTRAVENOUS EVERY 6 HOURS PRN
Status: DISPENSED | OUTPATIENT
Start: 2021-12-01 | End: 2021-12-04

## 2021-12-01 RX ORDER — ONDANSETRON 2 MG/ML
4 INJECTION INTRAMUSCULAR; INTRAVENOUS EVERY 6 HOURS PRN
Status: DISCONTINUED | OUTPATIENT
Start: 2021-12-01 | End: 2021-12-05 | Stop reason: HOSPADM

## 2021-12-01 RX ORDER — PROMETHAZINE HYDROCHLORIDE 12.5 MG/1
12.5 TABLET ORAL EVERY 6 HOURS PRN
Status: DISCONTINUED | OUTPATIENT
Start: 2021-12-01 | End: 2021-12-05 | Stop reason: HOSPADM

## 2021-12-01 RX ORDER — PROMETHAZINE HYDROCHLORIDE 12.5 MG/1
12.5 SUPPOSITORY RECTAL EVERY 6 HOURS PRN
Status: DISCONTINUED | OUTPATIENT
Start: 2021-12-01 | End: 2021-12-05 | Stop reason: HOSPADM

## 2021-12-01 RX ORDER — DEXAMETHASONE SODIUM PHOSPHATE 4 MG/ML
6 INJECTION, SOLUTION INTRA-ARTICULAR; INTRALESIONAL; INTRAMUSCULAR; INTRAVENOUS; SOFT TISSUE DAILY
Status: DISCONTINUED | OUTPATIENT
Start: 2021-12-01 | End: 2021-12-05 | Stop reason: HOSPADM

## 2021-12-01 RX ORDER — PROCHLORPERAZINE MALEATE 5 MG/1
5 TABLET ORAL EVERY 6 HOURS PRN
Status: DISCONTINUED | OUTPATIENT
Start: 2021-12-01 | End: 2021-12-05 | Stop reason: HOSPADM

## 2021-12-01 RX ORDER — KETOROLAC TROMETHAMINE 30 MG/ML
30 INJECTION, SOLUTION INTRAMUSCULAR; INTRAVENOUS ONCE
Status: COMPLETED | OUTPATIENT
Start: 2021-12-01 | End: 2021-12-01

## 2021-12-01 RX ORDER — PROCHLORPERAZINE EDISYLATE 5 MG/ML
5 INJECTION INTRAMUSCULAR; INTRAVENOUS EVERY 6 HOURS PRN
Status: DISCONTINUED | OUTPATIENT
Start: 2021-12-01 | End: 2021-12-05 | Stop reason: HOSPADM

## 2021-12-01 RX ORDER — ALBUTEROL SULFATE 90 UG/1
2 AEROSOL, METERED RESPIRATORY (INHALATION) EVERY 6 HOURS PRN
Status: DISCONTINUED | OUTPATIENT
Start: 2021-12-01 | End: 2021-12-05 | Stop reason: HOSPADM

## 2021-12-01 RX ORDER — SODIUM CHLORIDE 0.9 % (FLUSH) 0.9 %
10 SYRINGE (ML) INJECTION AS NEEDED
Status: DISCONTINUED | OUTPATIENT
Start: 2021-12-01 | End: 2021-12-05 | Stop reason: HOSPADM

## 2021-12-01 RX ORDER — BENZONATATE 100 MG/1
100 CAPSULE ORAL 3 TIMES DAILY PRN
Status: DISCONTINUED | OUTPATIENT
Start: 2021-12-01 | End: 2021-12-05 | Stop reason: HOSPADM

## 2021-12-01 RX ORDER — CHOLECALCIFEROL (VITAMIN D3) 125 MCG
10 CAPSULE ORAL NIGHTLY
Status: DISCONTINUED | OUTPATIENT
Start: 2021-12-01 | End: 2021-12-05 | Stop reason: HOSPADM

## 2021-12-01 RX ORDER — PROCHLORPERAZINE 25 MG
25 SUPPOSITORY, RECTAL RECTAL EVERY 12 HOURS PRN
Status: DISCONTINUED | OUTPATIENT
Start: 2021-12-01 | End: 2021-12-05 | Stop reason: HOSPADM

## 2021-12-01 RX ORDER — DESVENLAFAXINE SUCCINATE 50 MG/1
100 TABLET, EXTENDED RELEASE ORAL DAILY
Status: DISCONTINUED | OUTPATIENT
Start: 2021-12-02 | End: 2021-12-05 | Stop reason: HOSPADM

## 2021-12-01 RX ORDER — PANTOPRAZOLE SODIUM 40 MG/1
40 TABLET, DELAYED RELEASE ORAL
Status: DISCONTINUED | OUTPATIENT
Start: 2021-12-02 | End: 2021-12-05 | Stop reason: HOSPADM

## 2021-12-01 RX ORDER — ONDANSETRON 2 MG/ML
4 INJECTION INTRAMUSCULAR; INTRAVENOUS ONCE
Status: COMPLETED | OUTPATIENT
Start: 2021-12-01 | End: 2021-12-01

## 2021-12-01 RX ORDER — ACETAMINOPHEN 325 MG/1
650 TABLET ORAL EVERY 4 HOURS PRN
Status: DISCONTINUED | OUTPATIENT
Start: 2021-12-01 | End: 2021-12-05 | Stop reason: HOSPADM

## 2021-12-01 RX ORDER — BISOPROLOL FUMARATE 10 MG/1
10 TABLET, FILM COATED ORAL NIGHTLY
Status: DISCONTINUED | OUTPATIENT
Start: 2021-12-01 | End: 2021-12-04

## 2021-12-01 RX ORDER — ACETAMINOPHEN 650 MG/1
650 SUPPOSITORY RECTAL EVERY 4 HOURS PRN
Status: DISCONTINUED | OUTPATIENT
Start: 2021-12-01 | End: 2021-12-05 | Stop reason: HOSPADM

## 2021-12-01 RX ADMIN — SODIUM CHLORIDE 1 G: 900 INJECTION INTRAVENOUS at 11:24

## 2021-12-01 RX ADMIN — DESVENLAFAXINE SUCCINATE 100 MG: 50 TABLET, EXTENDED RELEASE ORAL at 09:53

## 2021-12-01 RX ADMIN — ENOXAPARIN SODIUM 40 MG: 40 INJECTION SUBCUTANEOUS at 14:53

## 2021-12-01 RX ADMIN — Medication 10 MG: at 20:45

## 2021-12-01 RX ADMIN — REMDESIVIR 200 MG: 100 INJECTION, POWDER, LYOPHILIZED, FOR SOLUTION INTRAVENOUS at 16:56

## 2021-12-01 RX ADMIN — SODIUM CHLORIDE 100 ML/HR: 9 INJECTION, SOLUTION INTRAVENOUS at 17:43

## 2021-12-01 RX ADMIN — KETOROLAC TROMETHAMINE 30 MG: 30 INJECTION, SOLUTION INTRAMUSCULAR; INTRAVENOUS at 14:52

## 2021-12-01 RX ADMIN — BISOPROLOL FUMARATE 10 MG: 10 TABLET ORAL at 20:44

## 2021-12-01 RX ADMIN — PROMETHAZINE HYDROCHLORIDE 12.5 MG: 12.5 TABLET ORAL at 17:32

## 2021-12-01 RX ADMIN — PROCHLORPERAZINE EDISYLATE 5 MG: 5 INJECTION INTRAMUSCULAR; INTRAVENOUS at 16:56

## 2021-12-01 RX ADMIN — ONDANSETRON 4 MG: 2 INJECTION INTRAMUSCULAR; INTRAVENOUS at 11:24

## 2021-12-01 RX ADMIN — DEXAMETHASONE 6 MG: 2 TABLET ORAL at 14:53

## 2021-12-01 RX ADMIN — ONDANSETRON 4 MG: 2 INJECTION INTRAMUSCULAR; INTRAVENOUS at 09:05

## 2021-12-01 RX ADMIN — ONDANSETRON 4 MG: 2 INJECTION INTRAMUSCULAR; INTRAVENOUS at 20:45

## 2021-12-01 RX ADMIN — ONDANSETRON 4 MG: 2 INJECTION INTRAMUSCULAR; INTRAVENOUS at 14:53

## 2021-12-01 NOTE — TELEPHONE ENCOUNTER
Patient's mother called to let you know that she is currently in our ER. She was diagnosed with COVID on Saturday and developed COVID associated pneumonia on Monday.

## 2021-12-01 NOTE — H&P
[facility] Medicine Services  HISTORY AND PHYSICAL    Patient Name: Cristina Haines  : 1985  MRN: [id number]  Primary Care Physician: Rahat Mercedes MD  Date of admission: 2021      Subjective   Subjective     Chief Complaint:  Syncope    HPI:  Cristina Haines is a 36 y.o. female recently diagnosed with COVID-19 (fully vaccinated but has not had booster yet) who presented to the ED due to complaint of syncope and not feeling well.  She reports that she tested positive for COVID  which is about when her symptoms began.  She has had fevers, chills, nausea, vomiting, cough and dyspnea.  She was in our ED two days ago and had CTA chest that was negative for PE.  She was discharged home.  Today she has had two episodes of losing consciousness briefly after vomiting.  She states that she will vomit, then briefly have a staring spell, then pass out.  She does have a history of atrial fibrillation s/p remote ablation and follows with Dr. Gentile.  She has had vasovagal syncope since then but never at this frequency.  She was found to have low blood pressure initially in the ED and oxygen saturations were 90-93% on room air.  Admission was requested for further management.      COVID Details:    Symptoms:    [] NONE [x] Fever [x]  Cough [x] Shortness of breath [x] Change in taste/smell     She is vaccinated except for booster    Review of Systems   Gen- + fevers, chills  CV- Occasional sharp pain in left side of chest  Resp- + cough, dyspnea  GI- +nausea/vomiting    All other systems reviewed and are negative.     Personal History     Past Medical History:   Diagnosis Date   • Abnormal uterine bleeding (AUB)    • Atrial fibrillation (HCC)    • Calcium deposits of brain     on MRI   • Cardiac abnormality    • Depression 2016   • Dysplasia of cervix    • Fibromyalgia 2016    probable   • Gestational diabetes     Class A1-Diet Controlled   •  Hemochromatosis    • Hypertension 12/16/2016   • Inappropriate sinus tachycardia 12/16/2016    Electrophysiology study with radiofrequency ablation, June 2011 at [facility] in [place], [place], no data.   Echocardiogram February 2016: EF 60% to 65%, impaired relaxation noted. Trace MR, trace TR.    • Kidney stone    • Migraines    • Monospot test positive     History of positive Monospot.     • Nephrolithiasis    • Neurocardiogenic syncope     with tachycardia. Pt reports she had cardiac ablation in 2011.   • Steatohepatitis        Past Surgical History:   Procedure Laterality Date   • CARDIAC ABLATION  2011    bypass tract ablation   • CERVICAL CONIZATION, LEEP  2005    MODERATE DYSPLASIA   • CYSTOSCOPY URETEROSCOPY Left 7/10/2016    Procedure: CYSTOSCOPY, LEFT URETEROSCOPY, STONE EXTRACTION, LEFT URETERAL STENT INSERTION UNDER FLUROSCOPY;  Surgeon: Bernardo Simental MD;  Location: [facility];  Service:    • DILATATION AND CURETTAGE  2010   • TOTAL LAPAROSCOPIC HYSTERECTOMY Bilateral 08/19/2013    SALPINGECTOMY       Family History:  family history includes Bradycardia in her brother; Colon cancer in her paternal grandmother; Depression in her mother; Diabetes in her father; Heart disease in her mother; Hemochromatosis in her father; Hyperlipidemia in her father; Hypertension in her father; Migraines in her mother. Otherwise pertinent FHx was reviewed and unremarkable.     Social History:  reports that she has never smoked. She has never used smokeless tobacco. She reports that she does not drink alcohol and does not use drugs.  Social History     Social History Narrative   • Not on file       Medications:  Available home medication information reviewed.  (Not in a hospital admission)      No Known Allergies    Objective   Objective     Vital Signs:   Temp:  [97.6 °F (36.4 °C)] 97.6 °F (36.4 °C)  Heart Rate:  [] 109  Resp:  [16-19] 18  BP: ()/(55-95) 105/65       Physical Exam    Constitutional: Awake, alert, sitting up in bed, ill appearing  Eyes: Sclerae anicteric, no conjunctival injection  HENT: NCAT, mucous membranes moist  Neck: Supple, trachea midline  Respiratory: Crackles bilaterally, nonlabored respirations   Cardiovascular: Mildly tachycardic, no murmurs, rubs, or gallops  Gastrointestinal: Positive bowel sounds, soft, nontender, nondistended  Musculoskeletal: No bilateral ankle edema, no clubbing or cyanosis to extremities  Psychiatric: Appropriate affect, cooperative  Neurologic: Moving all extremities equally, speech clear  Skin: No rashes on exposed surfaces    Result Review:  I have personally reviewed the results from the time of this admission to 12/1/2021 16:01 EST and agree with these findings:  [x]  Laboratory  []  Microbiology  [x]  Radiology  []  EKG/Telemetry   []  Cardiology/Vascular   []  Pathology  []  Old records  []  Other:  Most notable findings include:       LAB RESULTS:      Lab 12/01/21  1434 12/01/21  0836 11/29/21  1047   WBC  --   --  4.29   HEMOGLOBIN  --   --  15.0   HEMATOCRIT  --   --  44.5   PLATELETS  --   --  127*   NEUTROS ABS  --   --  1.92   IMMATURE GRANS (ABS)  --   --  0.01   LYMPHS ABS  --   --  1.71   MONOS ABS  --   --  0.58   EOS ABS  --   --  0.05   MCV  --   --  89.4   SED RATE  --   --  24*   CRP  --  2.62* 1.12*   PROCALCITONIN  --  0.04 0.07   LACTATE  --  1.0 2.0   LDH  --   --  167   D DIMER QUANT 0.80*  --  0.75*         Lab 12/01/21  0836 11/29/21  1047   SODIUM 135* 138   POTASSIUM 4.4 3.9   CHLORIDE 102 102   CO2 22.0 22.0   ANION GAP 11.0 14.0   BUN 9 10   CREATININE 0.79 0.76   GLUCOSE 104* 123*   CALCIUM 8.1* 9.1   MAGNESIUM 1.7 1.9         Lab 12/01/21  0836 11/29/21  1047   TOTAL PROTEIN 6.2 7.3   ALBUMIN 3.70 4.10   GLOBULIN 2.5 3.2   ALT (SGPT) 26 35*   AST (SGOT) 29 31   BILIRUBIN 0.2 <0.2   ALK PHOS 86 90         Lab 12/01/21  0836 11/29/21  1047   PROBNP  --  5.7   TROPONIN T <0.010 <0.010                 UA     Urinalysis 11/29/21   Specific Gravity, UA 1.018   Ketones, UA Negative   Blood, UA Negative   Leukocytes, UA Negative   Nitrite, UA Negative             Microbiology Results (last 10 days)     Procedure Component Value - Date/Time    Blood Culture - Blood, Arm, Left [[id number]]  (Normal) Collected: 11/29/21 1047    Lab Status: Preliminary result Specimen: Blood from Arm, Left Updated: 12/01/21 1130     Blood Culture No growth at 2 days    Blood Culture - Blood, Arm, Right [[id number]]  (Normal) Collected: 11/29/21 1047    Lab Status: Preliminary result Specimen: Blood from Arm, Right Updated: 12/01/21 1130     Blood Culture No growth at 2 days          XR Chest 1 View    Result Date: 12/1/2021  EXAMINATION: XR CHEST 1 VW-12/01/2021:  INDICATION: c19, hypoxia, recent pneumonia dx; R11.2-Nausea with vomiting, unspecified; U07.1-COVID-19; R09.02-Hypoxemia; R55-Syncope and collapse.  COMPARISON: 11/29/2021.  FINDINGS: Portable chest reveals mild increased markings seen in the lung bases bilaterally slightly worsening in the interval. Lung volumes are low. Cardiac and mediastinal silhouettes are within normal limits. The bony structures are unremarkable.      Impression: Mild increased markings identified in the lung bases bilaterally. Findings have slightly worsened in the interval.  D:  12/01/2021 E:  12/01/2021              Assessment/Plan   Assessment & Plan     Active Hospital Problems    Diagnosis  POA   • **Syncope [R55]  Yes   • Non-intractable vomiting [R11.10]  Yes   • Pneumonia due to COVID-19 virus [U07.1, J12.82]  Yes   • Atrial fibrillation (HCC) [I48.91]  Yes     Pneumonia due to COVID-19 with mild hypoxia  -Start dexamethasone 6mg daily x 10 days  -Start remdesivir daily x 5 doses, fewer if she improves quickly  -PRN antitussives, antiemetics, antipyretics, albuterol inhaler    Syncope  History of Atrial fibrillation and syncope  -Suspect vasovagal related to vomiting and also hypovolemia but given her  cardiac history and multiple episodes of syncope today, will have cardiology see her  -Gentle IVF x 500mL    Elevated D-dimer, LLE pain  -Family history of DVT in her father  -CTA chest did not show PE 11/29.  Low threshold to repeat with any change in status  -Obtain LLE venous duplex    DVT prophylaxis:  Lovenox      CODE STATUS:    Code Status and Medical Interventions:   Ordered at: 12/01/21 1401     Code Status (Patient has no pulse and is not breathing):    CPR (Attempt to Resuscitate)     Medical Interventions (Patient has pulse or is breathing):    Full Support       Admission Status:  I believe this patient meets OBSERVATION status, however if further evaluation or treatment plans warrant, status may change.  Based upon current information, I predict patient's care encounter to be less than or equal to 2 midnights.    Nancy Ham MD  12/01/21

## 2021-12-01 NOTE — ED PROVIDER NOTES
Subjective   This patient is a 36-year-old  female with a recent diagnosis of COVID-19 made last weekend.  She was seen here on Monday and diagnosed with Covid associated pneumonia.  Please refer to past medical documentation including but not limited to this last visit for details.  Patient tells me she was vaccinated for COVID-19 in the spring.  She started feeling sick last Wednesday, exactly 1 week ago.  She went to an urgent treatment center last Saturday and was diagnosed with COVID-19 and came in here Monday where she was diagnosed with pneumonia.  She was not sent home on antibiotics but tells me she received steroids.  Patient tells me she has developed nausea and vomiting since that time and had an episode of syncope today that was anxiety provoking for the patient.  Patient had a blood pressure of 70/40 according to EMS and the syncopal episode was witnessed by EMS.  Evidently, patient had another episode here while being triaged according to the patient.  Patient denies any chest pain or chest pressure.  She tells me she has been checking her oxygen levels at home with a pulse oximeter that she acquired in the last week and her oxygen levels have been in the 90% range.  Patient was found to have oxygen saturations of 90% here on room air, up to 96% on 2 L nasal cannula oxygenation.  Patient denies any hemoptysis or hematemesis.  Patient had a CT angiogram performed 2 days ago when she was here which was negative for pulmonary embolus.  Patient denies any headache or vision changes now.  She tells me she has fevers, chills, myalgias and multiple other associated COVID-19 symptoms.  She is pleasant, oriented x4, articulate and in no acute distress at the bedside other than appearing somewhat anxious/nervous.  She is unaccompanied.    Past medical history  Fibromyalgia, depression, history of cardiac ablation 10 years ago with unknown rhythm per patient.  Past medical documentation indicates atrial  fibrillation but patient states that this diagnosis is not familiar to her.  Positive hypertension.    Family history  DVT in father          Review of Systems   Constitutional: Positive for chills and fever. Negative for fatigue and unexpected weight change.   HENT: Negative for dental problem, ear pain, hearing loss and sinus pressure.    Eyes: Negative for pain and visual disturbance.   Respiratory: Negative for chest tightness and shortness of breath.    Cardiovascular: Negative for chest pain, palpitations and leg swelling.   Gastrointestinal: Positive for nausea and vomiting. Negative for blood in stool and diarrhea.   Genitourinary: Negative for difficulty urinating, dysuria, frequency, hematuria and urgency.   Musculoskeletal: Positive for arthralgias and myalgias. Negative for neck pain and neck stiffness.   Neurological: Positive for syncope. Negative for seizures, speech difficulty, light-headedness and headaches.   Psychiatric/Behavioral: Negative for confusion.   All other systems reviewed and are negative.      Past Medical History:   Diagnosis Date   • Abnormal uterine bleeding (AUB)    • Atrial fibrillation (HCC)    • Calcium deposits of brain     on MRI   • Cardiac abnormality    • Depression 12/16/2016   • Dysplasia of cervix    • Fibromyalgia 12/16/2016    probable   • Gestational diabetes     Class A1-Diet Controlled   • Hemochromatosis    • Hypertension 12/16/2016   • Inappropriate sinus tachycardia 12/16/2016    Electrophysiology study with radiofrequency ablation, June 2011 at [facility] in [place], [place], no data.   Echocardiogram February 2016: EF 60% to 65%, impaired relaxation noted. Trace MR, trace TR.    • Kidney stone    • Migraines    • Monospot test positive     History of positive Monospot.     • Nephrolithiasis    • Neurocardiogenic syncope     with tachycardia. Pt reports she had cardiac ablation in 2011.   • Steatohepatitis        No Known Allergies    Past Surgical  History:   Procedure Laterality Date   • CARDIAC ABLATION  2011    bypass tract ablation   • CERVICAL CONIZATION, LEEP  2005    MODERATE DYSPLASIA   • CYSTOSCOPY URETEROSCOPY Left 7/10/2016    Procedure: CYSTOSCOPY, LEFT URETEROSCOPY, STONE EXTRACTION, LEFT URETERAL STENT INSERTION UNDER FLUROSCOPY;  Surgeon: Bernardo Simental MD;  Location: [facility];  Service:    • DILATATION AND CURETTAGE  2010   • TOTAL LAPAROSCOPIC HYSTERECTOMY Bilateral 08/19/2013    SALPINGECTOMY       Family History   Problem Relation Age of Onset   • Colon cancer Paternal Grandmother    • Heart disease Mother    • Depression Mother    • Migraines Mother    • Hypertension Father    • Diabetes Father    • Hemochromatosis Father    • Hyperlipidemia Father    • Bradycardia Brother        Social History     Socioeconomic History   • Marital status:    Tobacco Use   • Smoking status: Never Smoker   • Smokeless tobacco: Never Used   Vaping Use   • Vaping Use: Never used   Substance and Sexual Activity   • Alcohol use: No   • Drug use: No   • Sexual activity: Yes     Partners: Male     Birth control/protection: Surgical           Objective   Physical Exam  Vitals and nursing note reviewed.   Constitutional:       General: She is not in acute distress.     Appearance: Normal appearance. She is normal weight. She is ill-appearing. She is not toxic-appearing.   HENT:      Head: Normocephalic and atraumatic.      Right Ear: External ear normal.      Left Ear: External ear normal.      Nose: Nose normal.      Mouth/Throat:      Mouth: Mucous membranes are dry.      Pharynx: Oropharynx is clear. No oropharyngeal exudate.   Eyes:      General:         Right eye: No discharge.         Left eye: No discharge.      Extraocular Movements: Extraocular movements intact.      Conjunctiva/sclera: Conjunctivae normal.      Pupils: Pupils are equal, round, and reactive to light.   Cardiovascular:      Rate and Rhythm: Normal rate and regular rhythm.       Pulses: Normal pulses.      Heart sounds: No murmur heard.      Pulmonary:      Effort: Pulmonary effort is normal.      Breath sounds: No wheezing.   Abdominal:      General: Abdomen is flat. Bowel sounds are normal. There is no distension.      Palpations: Abdomen is soft. There is no mass.      Tenderness: There is no abdominal tenderness.      Hernia: No hernia is present.   Musculoskeletal:         General: No deformity. Normal range of motion.      Cervical back: Normal range of motion and neck supple.      Right lower leg: No edema.      Left lower leg: No edema.   Skin:     Capillary Refill: Capillary refill takes less than 2 seconds.      Coloration: Skin is not jaundiced.      Findings: No bruising.   Neurological:      General: No focal deficit present.      Mental Status: She is alert and oriented to person, place, and time.   Psychiatric:         Mood and Affect: Mood normal.         Behavior: Behavior normal.         Thought Content: Thought content normal.         Procedures           ED Course  ED Course as of 12/01/21 1500   Wed Dec 01, 2021   0902 I started IV fluids on the patient and also ordered Zofran.  I also ordered supplemental oxygen for the patient to keep her oxygen saturations above 92% as she was approximately 90% when I was in the room.  We have talked about her syncopal episodes.  Patient is a little dehydrated.  I have started rehydration as previously mentioned.  Given the hypoxia, history of recent pneumonia, and now syncopal episodes x2 in the setting of prior ablation, I do believe patient would benefit from inpatient/acute level care and will discuss with the hospitalist.  No believe she needs a cardiologist acutely at this point.  We will get an EKG and assess it as well as labs including but not limited to troponin.  With the patient's low blood pressure, history of vomiting, I believe hypovolemia or perhaps a vasovagal etiology to her syncope is a possibility.  Have discussed  the differential diagnosis including cardiac dysrhythmia with the patient.  Patient is resting comfortably and agreeable to the plan.  Final impression and plan following completion of work-up. [CEFERINO]   0922 Blood pressure has come up nicely to 106/81 from 86/62.  Heart rate 96 with respirations of 18 and saturations 94% on room air. [CEFERINO]   0951 Chest x-ray has been reviewed independently by me and also read by radiology.  Official read/impression has been cut/pasted below.    IMPRESSION:  Mild increased markings identified lung bases bilaterally.  Findings of slightly worsened in the interval. [CEFERINO]   0952 CBC is pending but other labs have been resulted.  Magnesium is unremarkable at 1.7.  Troponin is less than 0.010.  CMP unremarkable with exception of a sodium of 135, calcium 8.1 and glucose 104. [CEFERINO]   0952 IV fluid rehydration continues.  Oxygen saturations are stable at this time.  I have ordered Rocephin and blood cultures on the patient given the worsening chest x-ray history of Covid associated pneumonia without antibiotics been provided previously.  I have paged the hospitalist, Dr. Ham for admission will get the patient admitted for further care.  Patient is appreciative for care and without question or complaint will be admitted accordingly. [CEFERINO]   0959 I discussed the case personally with Dr. Ham at approximately 9:58 AM Eastern time.  We discussed antibiotic regimen, lab results, and patient's recent medical course.  Patient will be admitted accordingly. [CEFERINO]      ED Course User Index  [CEFERINO] Shivam Reyes MD      Recent Results (from the past 24 hour(s))   Comprehensive Metabolic Panel    Collection Time: 12/01/21  8:36 AM    Specimen: Blood   Result Value Ref Range    Glucose 104 (H) 65 - 99 mg/dL    BUN 9 6 - 20 mg/dL    Creatinine 0.79 0.57 - 1.00 mg/dL    Sodium 135 (L) 136 - 145 mmol/L    Potassium 4.4 3.5 - 5.2 mmol/L    Chloride 102 98 - 107 mmol/L    CO2 22.0 22.0 - 29.0 mmol/L    Calcium  8.1 (L) 8.6 - 10.5 mg/dL    Total Protein 6.2 6.0 - 8.5 g/dL    Albumin 3.70 3.50 - 5.20 g/dL    ALT (SGPT) 26 1 - 33 U/L    AST (SGOT) 29 1 - 32 U/L    Alkaline Phosphatase 86 39 - 117 U/L    Total Bilirubin 0.2 0.0 - 1.2 mg/dL    eGFR Non African Amer 82 >60 mL/min/1.73    Globulin 2.5 gm/dL    A/G Ratio 1.5 g/dL    BUN/Creatinine Ratio 11.4 7.0 - 25.0    Anion Gap 11.0 5.0 - 15.0 mmol/L   Magnesium    Collection Time: 12/01/21  8:36 AM    Specimen: Blood   Result Value Ref Range    Magnesium 1.7 1.6 - 2.6 mg/dL   Troponin    Collection Time: 12/01/21  8:36 AM    Specimen: Blood   Result Value Ref Range    Troponin T <0.010 0.000 - 0.030 ng/mL   Green Top (Gel)    Collection Time: 12/01/21  8:36 AM   Result Value Ref Range    Extra Tube Hold for add-ons.    Gold Top - SST    Collection Time: 12/01/21  8:36 AM   Result Value Ref Range    Extra Tube Hold for add-ons.    Gray Top    Collection Time: 12/01/21  8:36 AM   Result Value Ref Range    Extra Tube Hold for add-ons.    Light Blue Top    Collection Time: 12/01/21  8:36 AM   Result Value Ref Range    Extra Tube hold for add-on    Lactic Acid, Plasma    Collection Time: 12/01/21  8:36 AM    Specimen: Blood   Result Value Ref Range    Lactate 1.0 0.5 - 2.0 mmol/L   Procalcitonin    Collection Time: 12/01/21  8:36 AM    Specimen: Blood   Result Value Ref Range    Procalcitonin 0.04 0.00 - 0.25 ng/mL   C-reactive Protein    Collection Time: 12/01/21  8:36 AM    Specimen: Blood   Result Value Ref Range    C-Reactive Protein 2.62 (H) 0.00 - 0.50 mg/dL     Note: In addition to lab results from this visit, the labs listed above may include labs taken at another facility or during a different encounter within the last 24 hours. Please correlate lab times with ED admission and discharge times for further clarification of the services performed during this visit.    XR Chest 1 View   Preliminary Result   Mild increased markings identified in the lung bases   bilaterally.  Findings have slightly worsened in the interval.       D:  12/01/2021   E:  12/01/2021                    Vitals:    12/01/21 1230 12/01/21 1245 12/01/21 1302 12/01/21 1329   BP: 108/68 108/68 119/80 105/65   Pulse:  100 101 109   Resp:  18     Temp:       TempSrc:       SpO2:  96% 92% 94%   Weight:       Height:         Medications   sodium chloride 0.9 % flush 10 mL (has no administration in time range)   desvenlafaxine (PRISTIQ) 24 hr tablet 100 mg (has no administration in time range)   bisoprolol (ZEBeta) tablet 10 mg (has no administration in time range)   melatonin tablet 10 mg (has no administration in time range)   pantoprazole (PROTONIX) EC tablet 40 mg (has no administration in time range)   enoxaparin (LOVENOX) syringe 40 mg (40 mg Subcutaneous Given 12/1/21 1453)   dexamethasone (DECADRON) tablet 6 mg (6 mg Oral Given 12/1/21 1453)     Or   dexamethasone (DECADRON) injection 6 mg ( Intravenous Not Given:  See Alt 12/1/21 1453)   dextromethorphan polistirex ER (DELSYM) 30 MG/5ML oral suspension 60 mg (has no administration in time range)   benzonatate (TESSALON) capsule 100 mg (has no administration in time range)   albuterol sulfate HFA (PROVENTIL HFA;VENTOLIN HFA;PROAIR HFA) inhaler 2 puff (has no administration in time range)   acetaminophen (TYLENOL) tablet 650 mg (has no administration in time range)     Or   acetaminophen (TYLENOL) suppository 650 mg (has no administration in time range)   ondansetron (ZOFRAN) injection 4 mg (4 mg Intravenous Given 12/1/21 1453)   ketorolac (TORADOL) injection 30 mg (has no administration in time range)   promethazine (PHENERGAN) tablet 12.5 mg (has no administration in time range)     Or   promethazine (PHENERGAN) suppository 12.5 mg (has no administration in time range)   remdesivir 200 mg in sodium chloride 0.9 % 290 mL IVPB (powder vial) (has no administration in time range)     Followed by   remdesivir 100 mg in sodium chloride 0.9 % 270 mL IVPB (powder vial)  (has no administration in time range)   Pharmacy Consult - Remdesivir (has no administration in time range)   prochlorperazine (COMPAZINE) injection 5 mg (has no administration in time range)     Or   prochlorperazine (COMPAZINE) tablet 5 mg (has no administration in time range)     Or   prochlorperazine (COMPAZINE) suppository 25 mg (has no administration in time range)   sodium chloride 0.9 % infusion (has no administration in time range)   prochlorperazine (COMPAZINE) injection 5 mg (has no administration in time range)   ondansetron (ZOFRAN) injection 4 mg (4 mg Intravenous Given 12/1/21 0905)   desvenlafaxine (PRISTIQ) 24 hr tablet 100 mg (100 mg Oral Given 12/1/21 0953)   cefTRIAXone (ROCEPHIN) 1 g/100 mL 0.9% NS (MBP) (0 g Intravenous Stopped 12/1/21 1212)   ondansetron (ZOFRAN) injection 4 mg (4 mg Intravenous Given 12/1/21 1124)   ketorolac (TORADOL) injection 30 mg (30 mg Intravenous Given 12/1/21 1452)     ECG/EMG Results (last 24 hours)     Procedure Component Value Units Date/Time    ECG 12 Lead [[id number]] Collected: 12/01/21 0950     Updated: 12/01/21 1052        ECG 12 Lead                                                          MDM    Final diagnoses:   Non-intractable vomiting with nausea, unspecified vomiting type   COVID-19   Hypoxia   Syncope and collapse   Pneumonia due to COVID-19 virus   Abnormal chest x-ray   Hypocalcemia       ED Disposition  ED Disposition     ED Disposition Condition Comment    Decision to Admit  Level of Care: Telemetry [5]   Diagnosis: Non-intractable vomiting with nausea, unspecified vomiting type [[id number]]   Bed Request Comments: Needs COVID isolation, could go to 5F   Certification: I Certify That Inpatient Hospital Services Are Medically Necessary For Greater Than 2 Midnights            No follow-up provider specified.       Medication List      No changes were made to your prescriptions during this visit.          Shivam Reyes MD  12/01/21 7803

## 2021-12-01 NOTE — CASE MANAGEMENT/SOCIAL WORK
Discharge Planning Assessment  [facility]     Patient Name: Cristina Haines  MRN: [id number]  Today's Date: 12/1/2021    Admit Date: 12/1/2021     Discharge Needs Assessment     Row Name 12/01/21 1529       Living Environment    Lives With spouse    Name(s) of Who Lives With Patient Lives with spouse, Sriram Haines, in [facility]    Current Living Arrangements home/apartment/condo    Primary Care Provided by self    Provides Primary Care For no one    Caregiving Concerns Reports she is independent with ADL's    Family Caregiver if Needed parent(s); spouse    Family Caregiver Names Mother listed as emergency contact, Janna Nice @ [phone]; lives with spouse    Quality of Family Relationships unable to assess    Able to Return to Prior Arrangements yes    Living Arrangement Comments Resides in private residence with spouse       Resource/Environmental Concerns    Resource/Environmental Concerns none    Transportation Concerns car, none       Transition Planning    Patient/Family Anticipates Transition to home    Patient/Family Anticipated Services at Transition     Transportation Anticipated family or friend will provide       Discharge Needs Assessment    Readmission Within the Last 30 Days no previous admission in last 30 days    Equipment Currently Used at Home pulse ox    Concerns to be Addressed no discharge needs identified    Concerns Comments No needs in ED, Case Management following    Anticipated Changes Related to Illness none    Discharge Coordination/Progress Anticipate patient will return home when medically stable               Discharge Plan     Row Name 12/01/21 1534       Plan    Plan Home    Patient/Family in Agreement with Plan yes  Patient    Plan Comments Planning hospital admission, met with patient at bedside.  Reports she lives in [place] with spouse, independent with ADL's with no DME use or home health involvement.  Made aware Case Management following,  appreciative.    Final Discharge Disposition Code 01 - home or self-care              Continued Care and Services - Admitted Since 12/1/2021    Coordination has not been started for this encounter.          Demographic Summary     Row Name 12/01/21 1528       General Information    Arrived From emergency department; home    Referral Source admission list; emergency department    Reason for Consult discharge planning    Preferred Language English     Used During This Interaction no               Functional Status    No documentation.                Psychosocial    No documentation.                Abuse/Neglect    No documentation.                Legal    No documentation.                Substance Abuse    No documentation.                Patient Forms    No documentation.                   DALJIT Mc

## 2021-12-02 ENCOUNTER — APPOINTMENT (OUTPATIENT)
Dept: CARDIOLOGY | Facility: HOSPITAL | Age: 36
End: 2021-12-02

## 2021-12-02 ENCOUNTER — APPOINTMENT (OUTPATIENT)
Dept: GENERAL RADIOLOGY | Facility: HOSPITAL | Age: 36
End: 2021-12-02

## 2021-12-02 LAB
ALBUMIN SERPL-MCNC: 3.6 G/DL (ref 3.5–5.2)
ALBUMIN SERPL-MCNC: 3.8 G/DL (ref 3.5–5.2)
ALBUMIN/GLOB SERPL: 1.3 G/DL
ALBUMIN/GLOB SERPL: 1.4 G/DL
ALP SERPL-CCNC: 75 U/L (ref 39–117)
ALP SERPL-CCNC: 81 U/L (ref 39–117)
ALT SERPL W P-5'-P-CCNC: 19 U/L (ref 1–33)
ALT SERPL W P-5'-P-CCNC: 22 U/L (ref 1–33)
ANION GAP SERPL CALCULATED.3IONS-SCNC: 11 MMOL/L (ref 5–15)
ANION GAP SERPL CALCULATED.3IONS-SCNC: 13 MMOL/L (ref 5–15)
ASCENDING AORTA: 3.2 CM
AST SERPL-CCNC: 19 U/L (ref 1–32)
AST SERPL-CCNC: 22 U/L (ref 1–32)
BASOPHILS # BLD AUTO: 0 10*3/MM3 (ref 0–0.2)
BASOPHILS # BLD AUTO: 0 10*3/MM3 (ref 0–0.2)
BASOPHILS NFR BLD AUTO: 0 % (ref 0–1.5)
BASOPHILS NFR BLD AUTO: 0 % (ref 0–1.5)
BH CV ECHO MEAS - AO MAX PG (FULL): 1.8 MMHG
BH CV ECHO MEAS - AO MAX PG: 6 MMHG
BH CV ECHO MEAS - AO MEAN PG (FULL): 2 MMHG
BH CV ECHO MEAS - AO MEAN PG: 4 MMHG
BH CV ECHO MEAS - AO ROOT AREA (BSA CORRECTED): 1.9
BH CV ECHO MEAS - AO ROOT AREA: 10.2 CM^2
BH CV ECHO MEAS - AO ROOT DIAM: 3.6 CM
BH CV ECHO MEAS - AO V2 MAX: 123 CM/SEC
BH CV ECHO MEAS - AO V2 MEAN: 91.5 CM/SEC
BH CV ECHO MEAS - AO V2 VTI: 27 CM
BH CV ECHO MEAS - ASC AORTA: 3.2 CM
BH CV ECHO MEAS - AVA(I,A): 2.7 CM^2
BH CV ECHO MEAS - AVA(I,D): 2.7 CM^2
BH CV ECHO MEAS - AVA(V,A): 2.6 CM^2
BH CV ECHO MEAS - AVA(V,D): 2.6 CM^2
BH CV ECHO MEAS - BSA(HAYCOCK): 2.1 M^2
BH CV ECHO MEAS - BSA: 1.9 M^2
BH CV ECHO MEAS - BZI_BMI: 38.7 KILOGRAMS/M^2
BH CV ECHO MEAS - BZI_METRIC_HEIGHT: 154.9 CM
BH CV ECHO MEAS - BZI_METRIC_WEIGHT: 93 KG
BH CV ECHO MEAS - EDV(CUBED): 54.9 ML
BH CV ECHO MEAS - EDV(MOD-SP2): 66.5 ML
BH CV ECHO MEAS - EDV(MOD-SP4): 68.4 ML
BH CV ECHO MEAS - EDV(TEICH): 62 ML
BH CV ECHO MEAS - EF(CUBED): 73.2 %
BH CV ECHO MEAS - EF(MOD-BP): 64 %
BH CV ECHO MEAS - EF(MOD-SP2): 67.4 %
BH CV ECHO MEAS - EF(MOD-SP4): 63.6 %
BH CV ECHO MEAS - EF(TEICH): 65.7 %
BH CV ECHO MEAS - ESV(CUBED): 14.7 ML
BH CV ECHO MEAS - ESV(MOD-SP2): 21.7 ML
BH CV ECHO MEAS - ESV(MOD-SP4): 24.9 ML
BH CV ECHO MEAS - ESV(TEICH): 21.2 ML
BH CV ECHO MEAS - FS: 35.5 %
BH CV ECHO MEAS - IVS/LVPW: 1
BH CV ECHO MEAS - IVSD: 0.9 CM
BH CV ECHO MEAS - LA DIMENSION: 4 CM
BH CV ECHO MEAS - LA/AO: 1.1
BH CV ECHO MEAS - LAD MAJOR: 5.5 CM
BH CV ECHO MEAS - LAT PEAK E' VEL: 9.1 CM/SEC
BH CV ECHO MEAS - LATERAL E/E' RATIO: 9.3
BH CV ECHO MEAS - LV DIASTOLIC VOL/BSA (35-75): 35.8 ML/M^2
BH CV ECHO MEAS - LV IVRT: 0.08 SEC
BH CV ECHO MEAS - LV MASS(C)D: 101.1 GRAMS
BH CV ECHO MEAS - LV MASS(C)DI: 52.9 GRAMS/M^2
BH CV ECHO MEAS - LV MAX PG: 4.2 MMHG
BH CV ECHO MEAS - LV MEAN PG: 2 MMHG
BH CV ECHO MEAS - LV SYSTOLIC VOL/BSA (12-30): 13 ML/M^2
BH CV ECHO MEAS - LV V1 MAX: 102 CM/SEC
BH CV ECHO MEAS - LV V1 MEAN: 73.4 CM/SEC
BH CV ECHO MEAS - LV V1 VTI: 23.4 CM
BH CV ECHO MEAS - LVIDD: 3.8 CM
BH CV ECHO MEAS - LVIDS: 2.5 CM
BH CV ECHO MEAS - LVLD AP2: 8.4 CM
BH CV ECHO MEAS - LVLD AP4: 8.6 CM
BH CV ECHO MEAS - LVLS AP2: 6.8 CM
BH CV ECHO MEAS - LVLS AP4: 7.1 CM
BH CV ECHO MEAS - LVOT AREA (M): 3.1 CM^2
BH CV ECHO MEAS - LVOT AREA: 3.1 CM^2
BH CV ECHO MEAS - LVOT DIAM: 2 CM
BH CV ECHO MEAS - LVPWD: 0.9 CM
BH CV ECHO MEAS - MED PEAK E' VEL: 7.5 CM/SEC
BH CV ECHO MEAS - MEDIAL E/E' RATIO: 11.3
BH CV ECHO MEAS - MV A MAX VEL: 57.8 CM/SEC
BH CV ECHO MEAS - MV DEC SLOPE: 477 CM/SEC^2
BH CV ECHO MEAS - MV DEC TIME: 0.2 SEC
BH CV ECHO MEAS - MV E MAX VEL: 84.8 CM/SEC
BH CV ECHO MEAS - MV E/A: 1.5
BH CV ECHO MEAS - MV MAX PG: 3.4 MMHG
BH CV ECHO MEAS - MV MEAN PG: 1 MMHG
BH CV ECHO MEAS - MV P1/2T MAX VEL: 89.5 CM/SEC
BH CV ECHO MEAS - MV P1/2T: 55 MSEC
BH CV ECHO MEAS - MV V2 MAX: 91.8 CM/SEC
BH CV ECHO MEAS - MV V2 MEAN: 52.7 CM/SEC
BH CV ECHO MEAS - MV V2 VTI: 23.5 CM
BH CV ECHO MEAS - MVA P1/2T LCG: 2.5 CM^2
BH CV ECHO MEAS - MVA(P1/2T): 4 CM^2
BH CV ECHO MEAS - MVA(VTI): 3.1 CM^2
BH CV ECHO MEAS - PA ACC TIME: 0.18 SEC
BH CV ECHO MEAS - PA MAX PG: 3.5 MMHG
BH CV ECHO MEAS - PA PR(ACCEL): -3.4 MMHG
BH CV ECHO MEAS - PA V2 MAX: 93.1 CM/SEC
BH CV ECHO MEAS - RAP SYSTOLE: 8 MMHG
BH CV ECHO MEAS - RVSP: 28 MMHG
BH CV ECHO MEAS - SI(AO): 144 ML/M^2
BH CV ECHO MEAS - SI(CUBED): 21 ML/M^2
BH CV ECHO MEAS - SI(LVOT): 38.5 ML/M^2
BH CV ECHO MEAS - SI(MOD-SP2): 23.5 ML/M^2
BH CV ECHO MEAS - SI(MOD-SP4): 22.8 ML/M^2
BH CV ECHO MEAS - SI(TEICH): 21.3 ML/M^2
BH CV ECHO MEAS - SV(AO): 274.8 ML
BH CV ECHO MEAS - SV(CUBED): 40.2 ML
BH CV ECHO MEAS - SV(LVOT): 73.5 ML
BH CV ECHO MEAS - SV(MOD-SP2): 44.8 ML
BH CV ECHO MEAS - SV(MOD-SP4): 43.5 ML
BH CV ECHO MEAS - SV(TEICH): 40.7 ML
BH CV ECHO MEAS - TAPSE (>1.6): 1.7 CM
BH CV ECHO MEAS - TR MAX PG: 20 MMHG
BH CV ECHO MEAS - TR MAX VEL: 219 CM/SEC
BH CV ECHO MEASUREMENTS AVERAGE E/E' RATIO: 10.22
BH CV LOWER VASCULAR LEFT COMMON FEMORAL AUGMENT: NORMAL
BH CV LOWER VASCULAR LEFT COMMON FEMORAL COMPRESS: NORMAL
BH CV LOWER VASCULAR LEFT COMMON FEMORAL PHASIC: NORMAL
BH CV LOWER VASCULAR LEFT COMMON FEMORAL SPONT: NORMAL
BH CV LOWER VASCULAR LEFT DISTAL FEMORAL COMPRESS: NORMAL
BH CV LOWER VASCULAR LEFT GASTRONEMIUS COMPRESS: NORMAL
BH CV LOWER VASCULAR LEFT GREATER SAPH AK COMPRESS: NORMAL
BH CV LOWER VASCULAR LEFT GREATER SAPH BK COMPRESS: NORMAL
BH CV LOWER VASCULAR LEFT LESSER SAPH COMPRESS: NORMAL
BH CV LOWER VASCULAR LEFT MID FEMORAL AUGMENT: NORMAL
BH CV LOWER VASCULAR LEFT MID FEMORAL COMPRESS: NORMAL
BH CV LOWER VASCULAR LEFT MID FEMORAL PHASIC: NORMAL
BH CV LOWER VASCULAR LEFT MID FEMORAL SPONT: NORMAL
BH CV LOWER VASCULAR LEFT PERONEAL COMPRESS: NORMAL
BH CV LOWER VASCULAR LEFT POPLITEAL AUGMENT: NORMAL
BH CV LOWER VASCULAR LEFT POPLITEAL COMPRESS: NORMAL
BH CV LOWER VASCULAR LEFT POPLITEAL PHASIC: NORMAL
BH CV LOWER VASCULAR LEFT POPLITEAL SPONT: NORMAL
BH CV LOWER VASCULAR LEFT POSTERIOR TIBIAL COMPRESS: NORMAL
BH CV LOWER VASCULAR LEFT PROFUNDA FEMORAL COMPRESS: NORMAL
BH CV LOWER VASCULAR LEFT PROXIMAL FEMORAL COMPRESS: NORMAL
BH CV LOWER VASCULAR LEFT SAPHENOFEMORAL JUNCTION COMPRESS: NORMAL
BH CV LOWER VASCULAR RIGHT COMMON FEMORAL AUGMENT: NORMAL
BH CV LOWER VASCULAR RIGHT COMMON FEMORAL COMPRESS: NORMAL
BH CV LOWER VASCULAR RIGHT COMMON FEMORAL PHASIC: NORMAL
BH CV LOWER VASCULAR RIGHT COMMON FEMORAL SPONT: NORMAL
BH CV VAS BP LEFT ARM: NORMAL MMHG
BH CV XLRA - RV BASE: 3.5 CM
BH CV XLRA - RV LENGTH: 6.9 CM
BH CV XLRA - RV MID: 2.5 CM
BH CV XLRA - TDI S': 12.5 CM/SEC
BILIRUB SERPL-MCNC: 0.2 MG/DL (ref 0–1.2)
BILIRUB SERPL-MCNC: <0.2 MG/DL (ref 0–1.2)
BUN SERPL-MCNC: 10 MG/DL (ref 6–20)
BUN SERPL-MCNC: 13 MG/DL (ref 6–20)
BUN/CREAT SERPL: 14.3 (ref 7–25)
BUN/CREAT SERPL: 18.3 (ref 7–25)
CALCIUM SPEC-SCNC: 8 MG/DL (ref 8.6–10.5)
CALCIUM SPEC-SCNC: 8.1 MG/DL (ref 8.6–10.5)
CHLORIDE SERPL-SCNC: 105 MMOL/L (ref 98–107)
CHLORIDE SERPL-SCNC: 109 MMOL/L (ref 98–107)
CO2 SERPL-SCNC: 21 MMOL/L (ref 22–29)
CO2 SERPL-SCNC: 22 MMOL/L (ref 22–29)
CREAT SERPL-MCNC: 0.7 MG/DL (ref 0.57–1)
CREAT SERPL-MCNC: 0.71 MG/DL (ref 0.57–1)
CRP SERPL-MCNC: 3.45 MG/DL (ref 0–0.5)
DEPRECATED RDW RBC AUTO: 39.7 FL (ref 37–54)
DEPRECATED RDW RBC AUTO: 40.4 FL (ref 37–54)
EOSINOPHIL # BLD AUTO: 0 10*3/MM3 (ref 0–0.4)
EOSINOPHIL # BLD AUTO: 0 10*3/MM3 (ref 0–0.4)
EOSINOPHIL NFR BLD AUTO: 0 % (ref 0.3–6.2)
EOSINOPHIL NFR BLD AUTO: 0 % (ref 0.3–6.2)
ERYTHROCYTE [DISTWIDTH] IN BLOOD BY AUTOMATED COUNT: 12.3 % (ref 12.3–15.4)
ERYTHROCYTE [DISTWIDTH] IN BLOOD BY AUTOMATED COUNT: 12.3 % (ref 12.3–15.4)
GFR SERPL CREATININE-BSD FRML MDRD: 93 ML/MIN/1.73
GFR SERPL CREATININE-BSD FRML MDRD: 94 ML/MIN/1.73
GLOBULIN UR ELPH-MCNC: 2.7 GM/DL
GLOBULIN UR ELPH-MCNC: 2.8 GM/DL
GLUCOSE BLDC GLUCOMTR-MCNC: 111 MG/DL (ref 70–130)
GLUCOSE SERPL-MCNC: 115 MG/DL (ref 65–99)
GLUCOSE SERPL-MCNC: 122 MG/DL (ref 65–99)
HCT VFR BLD AUTO: 37.7 % (ref 34–46.6)
HCT VFR BLD AUTO: 39.7 % (ref 34–46.6)
HGB BLD-MCNC: 12.8 G/DL (ref 12–15.9)
HGB BLD-MCNC: 13.6 G/DL (ref 12–15.9)
IMM GRANULOCYTES # BLD AUTO: 0.01 10*3/MM3 (ref 0–0.05)
IMM GRANULOCYTES # BLD AUTO: 0.01 10*3/MM3 (ref 0–0.05)
IMM GRANULOCYTES NFR BLD AUTO: 0.2 % (ref 0–0.5)
IMM GRANULOCYTES NFR BLD AUTO: 0.2 % (ref 0–0.5)
LEFT ATRIUM VOLUME INDEX: 21.1 ML/M^2
LEFT ATRIUM VOLUME: 40.3 ML
LYMPHOCYTES # BLD AUTO: 1.24 10*3/MM3 (ref 0.7–3.1)
LYMPHOCYTES # BLD AUTO: 1.94 10*3/MM3 (ref 0.7–3.1)
LYMPHOCYTES NFR BLD AUTO: 25.9 % (ref 19.6–45.3)
LYMPHOCYTES NFR BLD AUTO: 35.9 % (ref 19.6–45.3)
MAGNESIUM SERPL-MCNC: 1.9 MG/DL (ref 1.6–2.6)
MAGNESIUM SERPL-MCNC: 2.6 MG/DL (ref 1.6–2.6)
MCH RBC QN AUTO: 30.1 PG (ref 26.6–33)
MCH RBC QN AUTO: 30.3 PG (ref 26.6–33)
MCHC RBC AUTO-ENTMCNC: 34 G/DL (ref 31.5–35.7)
MCHC RBC AUTO-ENTMCNC: 34.3 G/DL (ref 31.5–35.7)
MCV RBC AUTO: 87.8 FL (ref 79–97)
MCV RBC AUTO: 89.1 FL (ref 79–97)
MONOCYTES # BLD AUTO: 0.33 10*3/MM3 (ref 0.1–0.9)
MONOCYTES # BLD AUTO: 0.43 10*3/MM3 (ref 0.1–0.9)
MONOCYTES NFR BLD AUTO: 6.9 % (ref 5–12)
MONOCYTES NFR BLD AUTO: 8 % (ref 5–12)
NEUTROPHILS NFR BLD AUTO: 3.02 10*3/MM3 (ref 1.7–7)
NEUTROPHILS NFR BLD AUTO: 3.2 10*3/MM3 (ref 1.7–7)
NEUTROPHILS NFR BLD AUTO: 55.9 % (ref 42.7–76)
NEUTROPHILS NFR BLD AUTO: 67 % (ref 42.7–76)
NRBC BLD AUTO-RTO: 0 /100 WBC (ref 0–0.2)
NRBC BLD AUTO-RTO: 0 /100 WBC (ref 0–0.2)
NT-PROBNP SERPL-MCNC: 162.3 PG/ML (ref 0–450)
PLATELET # BLD AUTO: 127 10*3/MM3 (ref 140–450)
PLATELET # BLD AUTO: 146 10*3/MM3 (ref 140–450)
PMV BLD AUTO: 10.2 FL (ref 6–12)
PMV BLD AUTO: 9.7 FL (ref 6–12)
POTASSIUM SERPL-SCNC: 4.1 MMOL/L (ref 3.5–5.2)
POTASSIUM SERPL-SCNC: 4.1 MMOL/L (ref 3.5–5.2)
PROT SERPL-MCNC: 6.4 G/DL (ref 6–8.5)
PROT SERPL-MCNC: 6.5 G/DL (ref 6–8.5)
RBC # BLD AUTO: 4.23 10*6/MM3 (ref 3.77–5.28)
RBC # BLD AUTO: 4.52 10*6/MM3 (ref 3.77–5.28)
SODIUM SERPL-SCNC: 139 MMOL/L (ref 136–145)
SODIUM SERPL-SCNC: 142 MMOL/L (ref 136–145)
TROPONIN T SERPL-MCNC: <0.01 NG/ML (ref 0–0.03)
WBC NRBC COR # BLD: 4.78 10*3/MM3 (ref 3.4–10.8)
WBC NRBC COR # BLD: 5.4 10*3/MM3 (ref 3.4–10.8)

## 2021-12-02 PROCEDURE — 93306 TTE W/DOPPLER COMPLETE: CPT

## 2021-12-02 PROCEDURE — 93005 ELECTROCARDIOGRAM TRACING: CPT | Performed by: INTERNAL MEDICINE

## 2021-12-02 PROCEDURE — 25010000002 KETOROLAC TROMETHAMINE PER 15 MG: Performed by: INTERNAL MEDICINE

## 2021-12-02 PROCEDURE — 25010000002 ENOXAPARIN PER 10 MG: Performed by: INTERNAL MEDICINE

## 2021-12-02 PROCEDURE — 85025 COMPLETE CBC W/AUTO DIFF WBC: CPT | Performed by: INTERNAL MEDICINE

## 2021-12-02 PROCEDURE — 83735 ASSAY OF MAGNESIUM: CPT

## 2021-12-02 PROCEDURE — 93306 TTE W/DOPPLER COMPLETE: CPT | Performed by: INTERNAL MEDICINE

## 2021-12-02 PROCEDURE — 82962 GLUCOSE BLOOD TEST: CPT

## 2021-12-02 PROCEDURE — 25010000002 MORPHINE PER 10 MG: Performed by: INTERNAL MEDICINE

## 2021-12-02 PROCEDURE — 0 MAGNESIUM SULFATE 4 GM/100ML SOLUTION: Performed by: INTERNAL MEDICINE

## 2021-12-02 PROCEDURE — 94799 UNLISTED PULMONARY SVC/PX: CPT

## 2021-12-02 PROCEDURE — 99232 SBSQ HOSP IP/OBS MODERATE 35: CPT | Performed by: INTERNAL MEDICINE

## 2021-12-02 PROCEDURE — 80053 COMPREHEN METABOLIC PANEL: CPT | Performed by: INTERNAL MEDICINE

## 2021-12-02 PROCEDURE — 71045 X-RAY EXAM CHEST 1 VIEW: CPT

## 2021-12-02 PROCEDURE — 83880 ASSAY OF NATRIURETIC PEPTIDE: CPT | Performed by: INTERNAL MEDICINE

## 2021-12-02 PROCEDURE — 25010000002 DEXAMETHASONE PER 1 MG: Performed by: INTERNAL MEDICINE

## 2021-12-02 PROCEDURE — 83735 ASSAY OF MAGNESIUM: CPT | Performed by: INTERNAL MEDICINE

## 2021-12-02 PROCEDURE — 84484 ASSAY OF TROPONIN QUANT: CPT | Performed by: INTERNAL MEDICINE

## 2021-12-02 PROCEDURE — 86140 C-REACTIVE PROTEIN: CPT | Performed by: INTERNAL MEDICINE

## 2021-12-02 PROCEDURE — 93010 ELECTROCARDIOGRAM REPORT: CPT | Performed by: INTERNAL MEDICINE

## 2021-12-02 PROCEDURE — 99222 1ST HOSP IP/OBS MODERATE 55: CPT | Performed by: INTERNAL MEDICINE

## 2021-12-02 PROCEDURE — 25010000002 PROCHLORPERAZINE 10 MG/2ML SOLUTION: Performed by: INTERNAL MEDICINE

## 2021-12-02 PROCEDURE — 63710000001 PROMETHAZINE PER 12.5 MG: Performed by: INTERNAL MEDICINE

## 2021-12-02 PROCEDURE — 93971 EXTREMITY STUDY: CPT

## 2021-12-02 PROCEDURE — 25010000002 ONDANSETRON PER 1 MG: Performed by: INTERNAL MEDICINE

## 2021-12-02 RX ORDER — MAGNESIUM SULFATE HEPTAHYDRATE 40 MG/ML
4 INJECTION, SOLUTION INTRAVENOUS AS NEEDED
Status: DISCONTINUED | OUTPATIENT
Start: 2021-12-02 | End: 2021-12-05 | Stop reason: HOSPADM

## 2021-12-02 RX ORDER — MAGNESIUM SULFATE HEPTAHYDRATE 40 MG/ML
2 INJECTION, SOLUTION INTRAVENOUS AS NEEDED
Status: DISCONTINUED | OUTPATIENT
Start: 2021-12-02 | End: 2021-12-05 | Stop reason: HOSPADM

## 2021-12-02 RX ORDER — SODIUM CHLORIDE, SODIUM LACTATE, POTASSIUM CHLORIDE, CALCIUM CHLORIDE 600; 310; 30; 20 MG/100ML; MG/100ML; MG/100ML; MG/100ML
100 INJECTION, SOLUTION INTRAVENOUS CONTINUOUS
Status: DISCONTINUED | OUTPATIENT
Start: 2021-12-02 | End: 2021-12-03

## 2021-12-02 RX ORDER — MORPHINE SULFATE 2 MG/ML
1 INJECTION, SOLUTION INTRAMUSCULAR; INTRAVENOUS ONCE AS NEEDED
Status: COMPLETED | OUTPATIENT
Start: 2021-12-02 | End: 2021-12-02

## 2021-12-02 RX ADMIN — MORPHINE SULFATE 1 MG: 2 INJECTION, SOLUTION INTRAMUSCULAR; INTRAVENOUS at 23:33

## 2021-12-02 RX ADMIN — MAGNESIUM SULFATE HEPTAHYDRATE 4 G: 40 INJECTION, SOLUTION INTRAVENOUS at 14:12

## 2021-12-02 RX ADMIN — PROCHLORPERAZINE EDISYLATE 5 MG: 5 INJECTION INTRAMUSCULAR; INTRAVENOUS at 09:41

## 2021-12-02 RX ADMIN — SODIUM CHLORIDE, POTASSIUM CHLORIDE, SODIUM LACTATE AND CALCIUM CHLORIDE 75 ML/HR: 600; 310; 30; 20 INJECTION, SOLUTION INTRAVENOUS at 14:11

## 2021-12-02 RX ADMIN — SODIUM CHLORIDE, PRESERVATIVE FREE 10 ML: 5 INJECTION INTRAVENOUS at 20:42

## 2021-12-02 RX ADMIN — PROMETHAZINE HYDROCHLORIDE 12.5 MG: 12.5 TABLET ORAL at 22:30

## 2021-12-02 RX ADMIN — PROMETHAZINE HYDROCHLORIDE 12.5 MG: 12.5 TABLET ORAL at 14:12

## 2021-12-02 RX ADMIN — PANTOPRAZOLE SODIUM 40 MG: 40 TABLET, DELAYED RELEASE ORAL at 03:09

## 2021-12-02 RX ADMIN — REMDESIVIR 100 MG: 100 INJECTION, POWDER, LYOPHILIZED, FOR SOLUTION INTRAVENOUS at 12:56

## 2021-12-02 RX ADMIN — KETOROLAC TROMETHAMINE 30 MG: 30 INJECTION, SOLUTION INTRAMUSCULAR; INTRAVENOUS at 14:11

## 2021-12-02 RX ADMIN — DESVENLAFAXINE SUCCINATE 100 MG: 50 TABLET, EXTENDED RELEASE ORAL at 09:42

## 2021-12-02 RX ADMIN — DEXAMETHASONE SODIUM PHOSPHATE 6 MG: 4 INJECTION, SOLUTION INTRA-ARTICULAR; INTRALESIONAL; INTRAMUSCULAR; INTRAVENOUS; SOFT TISSUE at 09:41

## 2021-12-02 RX ADMIN — PROMETHAZINE HYDROCHLORIDE 12.5 MG: 12.5 TABLET ORAL at 03:38

## 2021-12-02 RX ADMIN — BENZONATATE 100 MG: 100 CAPSULE ORAL at 20:41

## 2021-12-02 RX ADMIN — PROCHLORPERAZINE EDISYLATE 5 MG: 5 INJECTION INTRAMUSCULAR; INTRAVENOUS at 20:42

## 2021-12-02 RX ADMIN — KETOROLAC TROMETHAMINE 30 MG: 30 INJECTION, SOLUTION INTRAMUSCULAR; INTRAVENOUS at 03:38

## 2021-12-02 RX ADMIN — ACETAMINOPHEN 650 MG: 325 TABLET, FILM COATED ORAL at 03:09

## 2021-12-02 RX ADMIN — ENOXAPARIN SODIUM 40 MG: 40 INJECTION SUBCUTANEOUS at 14:11

## 2021-12-02 RX ADMIN — KETOROLAC TROMETHAMINE 30 MG: 30 INJECTION, SOLUTION INTRAMUSCULAR; INTRAVENOUS at 21:14

## 2021-12-02 RX ADMIN — Medication 10 MG: at 20:41

## 2021-12-02 RX ADMIN — BENZONATATE 100 MG: 100 CAPSULE ORAL at 12:55

## 2021-12-02 NOTE — PROGRESS NOTES
[facility] Medicine Services  PROGRESS NOTE    Patient Name: Cristina Haines  : 1985  MRN: [id number]    Date of Admission: 2021  Primary Care Physician: Rahat Mercedes MD    Subjective   Subjective     CC:  Syncope    HPI:  I have seen and evaluated the patient this morning.  Chart reviewed and events noted.  Comfortable in bed.  Mildly hypoxic requiring oxygen supplementation at 1 L/min.  There is concern about worsening respiratory status ongoing on the ventilator but I assured her that her symptoms are mild.  No further syncopal episodes.  No nausea or vomiting.    ROS:  10 point review of systems is negative except for what is mentioned in HPI    Objective   Objective     Vital Signs:   Temp:  [97.8 °F (36.6 °C)-100 °F (37.8 °C)] 97.8 °F (36.6 °C)  Heart Rate:  [] 77  Resp:  [16-18] 18  BP: ()/(55-95) 98/77  Flow (L/min):  [2-3] 3     Physical Exam:  General: Comfortable, not in any acute distress, appears stated age, conversant and cooperative  Head: Atraumatic and normocephalic, without obvious abnormality  Eyes:   Conjunctivae and sclerae normal, no Icterus. No pallor  Ears:  Ears appear intact with no abnormalities noted  Throat: No oral lesions, no thrush, oral mucosa moist  Neck: Supple, trachea midline, no thyromegaly  Back:   No kyphoscoliosis present. No tenderness to palpation,   no sacral edema  Lungs: Clear to auscultation bilaterally, equal air entry, no wheezing or crackles  Heart:  Normal S1 and S2, no murmur, no gallop, No JVD, no lower extremity swelling  Abdomen:  Soft, no tenderness, no organomegaly, normal bowel sounds  Normal bowel sounds, no masses, no organomegaly. Soft, nontender, nondistended, no guarding, no rebound tenderness.  Extremities: No gross abnormalities, no clubbing, pulses palpable and equal bilaterally  Skin: No bleeding, bruising or rash, normal skin turgor and elasticity  Neurologic: Cranial nerves appear  intact with no evidence of facial asymmetry, normal motor and sensory functions in all 4 extremities  Psych: Alert and oriented x 3, normal mood    Results Reviewed:  LAB RESULTS:      Lab 12/02/21  0431 12/02/21  0357 12/01/21  1705 12/01/21  1434 12/01/21  0836 11/29/21  1047   WBC  --  4.78 4.19  --   --  4.29   HEMOGLOBIN  --  12.8 13.6  --   --  15.0   HEMATOCRIT  --  37.7 39.2  --   --  44.5   PLATELETS  --  127* 109*  --   --  127*   NEUTROS ABS  --  3.20 2.56  --   --  1.92   IMMATURE GRANS (ABS)  --  0.01 0.01  --   --  0.01   LYMPHS ABS  --  1.24 1.29  --   --  1.71   MONOS ABS  --  0.33 0.32  --   --  0.58   EOS ABS  --  0.00 0.00  --   --  0.05   MCV  --  89.1 87.9  --   --  89.4   SED RATE  --   --   --   --   --  24*   CRP 3.45*  --   --   --  2.62* 1.12*   PROCALCITONIN  --   --   --   --  0.04 0.07   LACTATE  --   --   --   --  1.0 2.0   LDH  --   --   --   --   --  167   D DIMER QUANT  --   --   --  0.80*  --  0.75*         Lab 12/02/21  0431 12/01/21  0836 11/29/21  1047   SODIUM 139 135* 138   POTASSIUM 4.1 4.4 3.9   CHLORIDE 105 102 102   CO2 21.0* 22.0 22.0   ANION GAP 13.0 11.0 14.0   BUN 10 9 10   CREATININE 0.70 0.79 0.76   GLUCOSE 122* 104* 123*   CALCIUM 8.1* 8.1* 9.1   MAGNESIUM  --  1.7 1.9         Lab 12/02/21  0431 12/01/21  0836 11/29/21  1047   TOTAL PROTEIN 6.5 6.2 7.3   ALBUMIN 3.80 3.70 4.10   GLOBULIN 2.7 2.5 3.2   ALT (SGPT) 22 26 35*   AST (SGOT) 22 29 31   BILIRUBIN <0.2 0.2 <0.2   ALK PHOS 81 86 90         Lab 12/01/21  0836 11/29/21  1047   PROBNP  --  5.7   TROPONIN T <0.010 <0.010                 Brief Urine Lab Results  (Last result in the past 365 days)      Color   Clarity   Blood   Leuk Est   Nitrite   Protein   CREAT   Urine HCG        11/29/21 1132               Negative             Microbiology Results Abnormal     None          XR Chest 1 View    Result Date: 12/1/2021  EXAMINATION: XR CHEST 1 VW-12/01/2021:  INDICATION: c19, hypoxia, recent pneumonia dx;  R11.2-Nausea with vomiting, unspecified; U07.1-COVID-19; R09.02-Hypoxemia; R55-Syncope and collapse.  COMPARISON: 11/29/2021.  FINDINGS: Portable chest reveals mild increased markings seen in the lung bases bilaterally slightly worsening in the interval. Lung volumes are low. Cardiac and mediastinal silhouettes are within normal limits. The bony structures are unremarkable.      Impression: Mild increased markings identified in the lung bases bilaterally. Findings have slightly worsened in the interval.  D:  12/01/2021 E:  12/01/2021  This report was finalized on 12/1/2021 4:53 PM by Dr. Marguerite Alexander MD.            I have reviewed the medications:  Scheduled Meds:bisoprolol, 10 mg, Oral, Nightly  desvenlafaxine, 100 mg, Oral, Daily  dexamethasone, 6 mg, Oral, Daily   Or  dexamethasone, 6 mg, Intravenous, Daily  enoxaparin, 40 mg, Subcutaneous, Q24H  melatonin, 10 mg, Oral, Nightly  pantoprazole, 40 mg, Oral, Q AM  remdesivir, 100 mg, Intravenous, Q24H      Continuous Infusions:Pharmacy Consult - Remdesivir,   sodium chloride, 100 mL/hr, Last Rate: 100 mL/hr (12/01/21 1743)      PRN Meds:.•  acetaminophen **OR** acetaminophen  •  albuterol sulfate HFA  •  benzonatate  •  dextromethorphan polistirex ER  •  ketorolac  •  ondansetron  •  Pharmacy Consult - Remdesivir  •  prochlorperazine **OR** prochlorperazine **OR** prochlorperazine  •  promethazine **OR** promethazine  •  sodium chloride    Assessment/Plan   Assessment & Plan     Active Hospital Problems    Diagnosis  POA   • **Syncope [R55]  Yes   • Non-intractable vomiting [R11.10]  Yes   • Pneumonia due to COVID-19 virus [U07.1, J12.82]  Yes   • Atrial fibrillation (HCC) [I48.91]  Yes      Resolved Hospital Problems   No resolved problems to display.        Brief Hospital Course to date:  Cristina Haines is a 36 y.o. female patient with past medical history of atrial fibrillation, status post remote ablationfFollowing with Dr. Gentile, and chronic  recurrent vasovagal syncopal episodes presented to the hospital with shortness of breath and recurrent syncopal episodes related to excessive nausea and vomiting and was found to have COVID-19 pneumonia.  She disposed for COVID-19 on 11/27/2021 despite being vaccinated with [employer] vaccine 6 months ago.    Assessment and plan:  Severe COVID-19 infection with bilateral pneumonia  · Continue dexamethasone  · Continue remdesivir  · Continue oxygen supplementation to target oxygen saturation above 90%  · Duo nebs  · Incentive spirometry  · Antitussives    Recurrent syncopal episodes  History of atrial fibrillation status post remote ablation  · They are chronic and vasovagal related to vomiting  · Was slightly hypovolemic in the ER which might aggravated her symptoms.  Continue IV fluids and encourage p.o. intake  · Cardiology following.  Appreciate help and recommendations    Elevated D-dimer  Rule out left lower extremity DVT  · D-dimer is likely related to her COVID-19 pneumonia  · CTA chest with no evidence of PE  · Duplex study left lower extremity is pending    Obesity, BMI 38.8  · Complicates all aspect of management plan      DVT prophylaxis:  Medical DVT prophylaxis orders are present.            Disposition: I expect the patient to be discharged to be determined    CODE STATUS:   Code Status and Medical Interventions:   Ordered at: 12/01/21 1401     Code Status (Patient has no pulse and is not breathing):    CPR (Attempt to Resuscitate)     Medical Interventions (Patient has pulse or is breathing):    Full Support       Jorge Maldonado MD  12/02/21

## 2021-12-02 NOTE — CONSULTS
[facility]  Cardiovascular Consultation Note    Reason for consultation: Syncope    History of Present Illness:  36-year-old female with prior inappropriate sinus tachycardia status post ablation in [place] in 2011, history of vasovagal syncope, hemochromatosis, depression, kidney stones recently diagnosed with Covid.  She came into the hospital after 2 syncopal episodes.  The first syncopal episode occurred while her and her  are on route to get antibiotic treatment.  She became quite nauseated and pulled over the side of the road and vomited them when she was in the car she passed out.  She had an episode of syncope here after intense heaving and vomiting.  She states otherwise she does pretty well.  Her heart occasionally races but no big deal.  She denies any dyspnea on exertion no exertional chest pain.  She has been dealing with vasovagal syncope since she was 19 years ago.    Cardiac risk factors: None    Past medical and surgical history, social and family history reviewed in EMR.    REVIEW OF SYSTEMS:     Past Medical History:   Diagnosis Date   • Abnormal uterine bleeding (AUB)    • Atrial fibrillation (HCC)    • Calcium deposits of brain     on MRI   • Cardiac abnormality    • Depression 12/16/2016   • Dysplasia of cervix    • Fibromyalgia 12/16/2016    probable   • Gestational diabetes     Class A1-Diet Controlled   • Hemochromatosis    • Hypertension 12/16/2016   • Inappropriate sinus tachycardia 12/16/2016    Electrophysiology study with radiofrequency ablation, June 2011 at [facility] in [place], [place], no data.   Echocardiogram February 2016: EF 60% to 65%, impaired relaxation noted. Trace MR, trace TR.    • Kidney stone    • Migraines    • Monospot test positive     History of positive Monospot.     • Nephrolithiasis    • Neurocardiogenic syncope     with tachycardia. Pt reports she had cardiac ablation in 2011.   • Steatohepatitis      Past  Surgical History:   Procedure Laterality Date   • CARDIAC ABLATION  2011    bypass tract ablation   • CERVICAL CONIZATION, LEEP  2005    MODERATE DYSPLASIA   • CYSTOSCOPY URETEROSCOPY Left 7/10/2016    Procedure: CYSTOSCOPY, LEFT URETEROSCOPY, STONE EXTRACTION, LEFT URETERAL STENT INSERTION UNDER FLUROSCOPY;  Surgeon: Bernardo Simental MD;  [facility];  Service:    • DILATATION AND CURETTAGE  2010   • TOTAL LAPAROSCOPIC HYSTERECTOMY Bilateral 08/19/2013    SALPINGECTOMY     Social History     Socioeconomic History   • Marital status:    Tobacco Use   • Smoking status: Never Smoker   • Smokeless tobacco: Never Used   Vaping Use   • Vaping Use: Never used   Substance and Sexual Activity   • Alcohol use: No   • Drug use: No   • Sexual activity: Yes     Partners: Male     Birth control/protection: Surgical     Family History   Problem Relation Age of Onset   • Colon cancer Paternal Grandmother    • Heart disease Mother    • Depression Mother    • Migraines Mother    • Hypertension Father    • Diabetes Father    • Hemochromatosis Father    • Hyperlipidemia Father    • Bradycardia Brother        H&P ROS reviewed and pertinent CV ROS as noted in HPI.    Cardiac: Has had inappropriate sinus tachycardia and successful ablation 2011  Respiratory: No dyspnea no wheezing.  However with the Covid symptoms she has noticed some dyspnea on exertion  Lower Extremities: No swelling or lesions  GI: Complains of nausea and vomiting  Neuro: No stroke TIA or neurologic event  Hematology: No bleeding diathesis ecchymosis or petechia  Renal: No hematuria or dysuria  Musculoskeletal: No joint pain  Endocrine: Does not have diabetes or hypothyroidism  Constitutional: Has had cough shortness of breath fever  Psych: No depression or suicidal ideation      Physical Exam: General Pleasant obese female in bed receiving a lower extremity Doppler dyspneic not tachypneic current heart rate 95       HEENT: No JVP or bruits        Respiratory: Clear anteriorly       Cardiovascular: Regular rate and rhythm without murmur gallop or click and no edema to palpation       GI: Soft       Lower Extremities: No lesions       Neuro: Facial expressions are symmetrical       Skin: Warm and dry       Psych: Pleasant affect    Results Review: Review of telemetry shows no bradycardia.  She is having episodes of sinus tachycardia but no supraventricular ventricular arrhythmias.  She has no normal prior ejection fraction blood work is significant for an elevated CRP, D-dimer.  EKG shows no ischemia           Vital Sign Min/Max for last 24 hours  Temp  Min: 97.8 °F (36.6 °C)  Max: 100 °F (37.8 °C)   BP  Min: 85/69  Max: 119/80   Pulse  Min: 72  Max: 112   Resp  Min: 16  Max: 18   SpO2  Min: 91 %  Max: 97 %   No data recorded      Intake/Output Summary (Last 24 hours) at 12/2/2021 0952  Last data filed at 12/1/2021 1212  Gross per 24 hour   Intake 100 ml   Output --   Net 100 ml             Current Facility-Administered Medications:   •  acetaminophen (TYLENOL) tablet 650 mg, 650 mg, Oral, Q4H PRN, 650 mg at 12/02/21 0309 **OR** acetaminophen (TYLENOL) suppository 650 mg, 650 mg, Rectal, Q4H PRN, Nancy Ham MD  •  albuterol sulfate HFA (PROVENTIL HFA;VENTOLIN HFA;PROAIR HFA) inhaler 2 puff, 2 puff, Inhalation, Q6H PRN, Nancy Ham MD  •  benzonatate (TESSALON) capsule 100 mg, 100 mg, Oral, TID PRN, Nancy Ham MD  •  bisoprolol (ZEBeta) tablet 10 mg, 10 mg, Oral, Nightly, Nancy Ham MD, 10 mg at 12/01/21 2044  •  desvenlafaxine (PRISTIQ) 24 hr tablet 100 mg, 100 mg, Oral, Daily, Nancy Ham MD, 100 mg at 12/02/21 0942  •  dexamethasone (DECADRON) tablet 6 mg, 6 mg, Oral, Daily, 6 mg at 12/01/21 1453 **OR** dexamethasone (DECADRON) injection 6 mg, 6 mg, Intravenous, Daily, Nancy Ham MD, 6 mg at 12/02/21 0941  •  dextromethorphan polistirex ER (DELSYM) 30 MG/5ML oral suspension 60 mg, 60 mg, Oral, Q12H PRN, Nancy Ham MD  •  enoxaparin  (LOVENOX) syringe 40 mg, 40 mg, Subcutaneous, Q24H, Nancy Ham MD, 40 mg at 12/01/21 1453  •  ketorolac (TORADOL) injection 30 mg, 30 mg, Intravenous, Q6H PRN, Nancy Ham MD, 30 mg at 12/02/21 0338  •  melatonin tablet 10 mg, 10 mg, Oral, Nightly, Nancy Ham MD, 10 mg at 12/01/21 2045  •  ondansetron (ZOFRAN) injection 4 mg, 4 mg, Intravenous, Q6H PRN, Nancy Ham MD, 4 mg at 12/01/21 2045  •  pantoprazole (PROTONIX) EC tablet 40 mg, 40 mg, Oral, Q AM, Nancy Ham MD, 40 mg at 12/02/21 0309  •  Pharmacy Consult - Remdesivir, , Does not apply, Continuous PRN, Nancy Ham MD  •  prochlorperazine (COMPAZINE) injection 5 mg, 5 mg, Intravenous, Q6H PRN, 5 mg at 12/02/21 0941 **OR** prochlorperazine (COMPAZINE) tablet 5 mg, 5 mg, Oral, Q6H PRN **OR** prochlorperazine (COMPAZINE) suppository 25 mg, 25 mg, Rectal, Q12H PRN, Nancy Ham MD  •  promethazine (PHENERGAN) tablet 12.5 mg, 12.5 mg, Oral, Q6H PRN, 12.5 mg at 12/02/21 0338 **OR** promethazine (PHENERGAN) suppository 12.5 mg, 12.5 mg, Rectal, Q6H PRN, Nancy Ham MD  •  [COMPLETED] remdesivir 200 mg in sodium chloride 0.9 % 290 mL IVPB (powder vial), 200 mg, Intravenous, Q24H, 200 mg at 12/01/21 1656 **FOLLOWED BY** remdesivir 100 mg in sodium chloride 0.9 % 270 mL IVPB (powder vial), 100 mg, Intravenous, Q24H, Nancy Ham MD  •  sodium chloride 0.9 % flush 10 mL, 10 mL, Intravenous, PRN, Shivam Reyes MD  •  sodium chloride 0.9 % infusion, 100 mL/hr, Intravenous, Continuous, Nancy Ham MD, Last Rate: 100 mL/hr at 12/01/21 1743, 100 mL/hr at 12/01/21 1743    Lab Review:   Results from last 7 days   Lab Units 12/02/21  0357 12/01/21  1705 11/29/21  1047   WBC 10*3/mm3 4.78 4.19 4.29   HEMOGLOBIN g/dL 12.8 13.6 15.0   PLATELETS 10*3/mm3 127* 109* 127*     Results from last 7 days   Lab Units 12/02/21  0431 12/01/21  0836 11/29/21  1047   SODIUM mmol/L 139 135* 138   POTASSIUM mmol/L 4.1 4.4 3.9   CO2 mmol/L 21.0* 22.0 22.0   BUN  mg/dL 10 9 10   CREATININE mg/dL 0.70 0.79 0.76   MAGNESIUM mg/dL  --  1.7 1.9   GLUCOSE mg/dL 122* 104* 123*     Estimated Creatinine Clearance: 114.6 mL/min (by C-G formula based on SCr of 0.7 mg/dL).        .lipd  Lab Results   Component Value Date    TRIG 126 01/22/2021    HDL 59 01/22/2021    AST 22 12/02/2021    ALT 22 12/02/2021       Radiology Reports:  Imaging Results (Last 72 Hours)     Procedure Component Value Units Date/Time    XR Chest 1 View [[id number]] Collected: 12/01/21 0924     Updated: 12/01/21 1656    Narrative:      EXAMINATION: XR CHEST 1 VW-12/01/2021:      INDICATION: c19, hypoxia, recent pneumonia dx; R11.2-Nausea with  vomiting, unspecified; U07.1-COVID-19; R09.02-Hypoxemia; R55-Syncope and  collapse.      COMPARISON: 11/29/2021.     FINDINGS: Portable chest reveals mild increased markings seen in the  lung bases bilaterally slightly worsening in the interval. Lung volumes  are low. Cardiac and mediastinal silhouettes are within normal limits.  The bony structures are unremarkable.       Impression:      Mild increased markings identified in the lung bases  bilaterally. Findings have slightly worsened in the interval.     D:  12/01/2021  E:  12/01/2021     This report was finalized on 12/1/2021 4:53 PM by Dr. Marguerite Alexander MD.             Assessment/Plan: 1 vasovagal syncopal episodes x2 following profound nausea and vomiting with presignificant retching during the vomiting.  She is not had any other episodes leading up to this acute Covid infection.  We will check an echocardiogram to make sure there is no involvement of the myocardium with Covid but otherwise there is nothing else to add.  2 tachycardia-resume beta-blocker if blood pressure allows      Chris Jeff MD  12/02/21  09:52 EST   Will read the echocardiogram today and if normal will sign off the case.  Please contact me over the weekend for any issues.  Recommend good control of nausea

## 2021-12-02 NOTE — PAYOR COMM NOTE
"Ainsley Russell RN   Phone [phone]  Fax [phone]    Cristina Haines (36 y.o. Female)             Date of Birth Social Security Number Address Home Phone MRN    1985  [address] [place] [address] [phone] [id number]    Shinto Marital Status             None        Admission Date Admission Type Admitting Provider Attending Provider Department, Room/Bed    21 Emergency Jorge Maldonado MD Younis, Mohamed Ahmed, MD [facility] 5F, S524/    Discharge Date Discharge Disposition Discharge Destination                         Attending Provider: Jorge Maldonado MD    Allergies: No Known Allergies    Isolation: Contact Air   Infection: COVID (confirmed) (21)   Code Status: CPR   Advance Care Planning Activity    Ht: 154.9 cm (61\")   Wt: 93.4 kg (205 lb 12.8 oz)    Admission Cmt: None   Principal Problem: Syncope [R55]                 Active Insurance as of 2021     Primary Coverage     Payor Plan Insurance Group Employer/Plan Group    [employer] [place] [employer] [place]ICA[place]      Payor Plan Address Payor Plan Phone Number Payor Plan Fax Number Effective Dates    [address] [address] [phone]  2021 - None Entered    [place] [address]       Subscriber Name Subscriber Birth Date Member ID       CRISTINA HAINES 1985 [id number]                 Emergency Contacts      (Rel.) Home Phone Work Phone Mobile Phone    Janna Nice (Mother) [phone] -- [phone]               History & Physical      Nancy Ham MD at 21 [address] [place] [address] Medicine Services  HISTORY AND PHYSICAL    Patient Name: Cristina Haines  : 1985  MRN: [id number]  Primary Care Physician: Rahat Mercedes MD  Date of admission: 2021      Subjective   Subjective     Chief Complaint:  Syncope    HPI:  Cristina Haines is a 36 y.o. female recently diagnosed with COVID-19 (fully " vaccinated but has not had booster yet) who presented to the ED due to complaint of syncope and not feeling well.  She reports that she tested positive for COVID 11/27 which is about when her symptoms began.  She has had fevers, chills, nausea, vomiting, cough and dyspnea.  She was in our ED two days ago and had CTA chest that was negative for PE.  She was discharged home.  Today she has had two episodes of losing consciousness briefly after vomiting.  She states that she will vomit, then briefly have a staring spell, then pass out.  She does have a history of atrial fibrillation s/p remote ablation and follows with Dr. Gentile.  She has had vasovagal syncope since then but never at this frequency.  She was found to have low blood pressure initially in the ED and oxygen saturations were 90-93% on room air.  Admission was requested for further management.      COVID Details:    Symptoms:    [] NONE [x] Fever [x]  Cough [x] Shortness of breath [x] Change in taste/smell     She is vaccinated except for booster    Review of Systems   Gen- + fevers, chills  CV- Occasional sharp pain in left side of chest  Resp- + cough, dyspnea  GI- +nausea/vomiting    All other systems reviewed and are negative.     Personal History     Past Medical History:   Diagnosis Date   • Abnormal uterine bleeding (AUB)    • Atrial fibrillation (HCC)    • Calcium deposits of brain     on MRI   • Cardiac abnormality    • Depression 12/16/2016   • Dysplasia of cervix    • Fibromyalgia 12/16/2016    probable   • Gestational diabetes     Class A1-Diet Controlled   • Hemochromatosis    • Hypertension 12/16/2016   • Inappropriate sinus tachycardia 12/16/2016    Electrophysiology study with radiofrequency ablation, June 2011 at [facility] in [place], [place], no data.   Echocardiogram February 2016: EF 60% to 65%, impaired relaxation noted. Trace MR, trace TR.    • Kidney stone    • Migraines    • Monospot test positive     History of  positive Monospot.     • Nephrolithiasis    • Neurocardiogenic syncope     with tachycardia. Pt reports she had cardiac ablation in 2011.   • Steatohepatitis        Past Surgical History:   Procedure Laterality Date   • CARDIAC ABLATION  2011    bypass tract ablation   • CERVICAL CONIZATION, LEEP  2005    MODERATE DYSPLASIA   • CYSTOSCOPY URETEROSCOPY Left 7/10/2016    Procedure: CYSTOSCOPY, LEFT URETEROSCOPY, STONE EXTRACTION, LEFT URETERAL STENT INSERTION UNDER FLUROSCOPY;  Surgeon: Bernardo Simental MD;  Location: [facility];  Service:    • DILATATION AND CURETTAGE  2010   • TOTAL LAPAROSCOPIC HYSTERECTOMY Bilateral 08/19/2013    SALPINGECTOMY       Family History:  family history includes Bradycardia in her brother; Colon cancer in her paternal grandmother; Depression in her mother; Diabetes in her father; Heart disease in her mother; Hemochromatosis in her father; Hyperlipidemia in her father; Hypertension in her father; Migraines in her mother. Otherwise pertinent FHx was reviewed and unremarkable.     Social History:  reports that she has never smoked. She has never used smokeless tobacco. She reports that she does not drink alcohol and does not use drugs.  Social History     Social History Narrative   • Not on file       Medications:  Available home medication information reviewed.  (Not in a hospital admission)      No Known Allergies    Objective   Objective     Vital Signs:   Temp:  [97.6 °F (36.4 °C)] 97.6 °F (36.4 °C)  Heart Rate:  [] 109  Resp:  [16-19] 18  BP: ()/(55-95) 105/65       Physical Exam   Constitutional: Awake, alert, sitting up in bed, ill appearing  Eyes: Sclerae anicteric, no conjunctival injection  HENT: NCAT, mucous membranes moist  Neck: Supple, trachea midline  Respiratory: Crackles bilaterally, nonlabored respirations   Cardiovascular: Mildly tachycardic, no murmurs, rubs, or gallops  Gastrointestinal: Positive bowel sounds, soft, nontender,  nondistended  Musculoskeletal: No bilateral ankle edema, no clubbing or cyanosis to extremities  Psychiatric: Appropriate affect, cooperative  Neurologic: Moving all extremities equally, speech clear  Skin: No rashes on exposed surfaces    Result Review:  I have personally reviewed the results from the time of this admission to 12/1/2021 16:01 EST and agree with these findings:  [x]  Laboratory  []  Microbiology  [x]  Radiology  []  EKG/Telemetry   []  Cardiology/Vascular   []  Pathology  []  Old records  []  Other:  Most notable findings include:       LAB RESULTS:      Lab 12/01/21  1434 12/01/21  0836 11/29/21  1047   WBC  --   --  4.29   HEMOGLOBIN  --   --  15.0   HEMATOCRIT  --   --  44.5   PLATELETS  --   --  127*   NEUTROS ABS  --   --  1.92   IMMATURE GRANS (ABS)  --   --  0.01   LYMPHS ABS  --   --  1.71   MONOS ABS  --   --  0.58   EOS ABS  --   --  0.05   MCV  --   --  89.4   SED RATE  --   --  24*   CRP  --  2.62* 1.12*   PROCALCITONIN  --  0.04 0.07   LACTATE  --  1.0 2.0   LDH  --   --  167   D DIMER QUANT 0.80*  --  0.75*         Lab 12/01/21  0836 11/29/21  1047   SODIUM 135* 138   POTASSIUM 4.4 3.9   CHLORIDE 102 102   CO2 22.0 22.0   ANION GAP 11.0 14.0   BUN 9 10   CREATININE 0.79 0.76   GLUCOSE 104* 123*   CALCIUM 8.1* 9.1   MAGNESIUM 1.7 1.9         Lab 12/01/21  0836 11/29/21  1047   TOTAL PROTEIN 6.2 7.3   ALBUMIN 3.70 4.10   GLOBULIN 2.5 3.2   ALT (SGPT) 26 35*   AST (SGOT) 29 31   BILIRUBIN 0.2 <0.2   ALK PHOS 86 90         Lab 12/01/21  0836 11/29/21  1047   PROBNP  --  5.7   TROPONIN T <0.010 <0.010                 UA    Urinalysis 11/29/21   Specific Gravity, UA 1.018   Ketones, UA Negative   Blood, UA Negative   Leukocytes, UA Negative   Nitrite, UA Negative             Microbiology Results (last 10 days)     Procedure Component Value - Date/Time    Blood Culture - Blood, Arm, Left [[id number]]  (Normal) Collected: 11/29/21 1047    Lab Status: Preliminary result Specimen: Blood from Arm,  Left Updated: 12/01/21 1130     Blood Culture No growth at 2 days    Blood Culture - Blood, Arm, Right [[id number]]  (Normal) Collected: 11/29/21 1047    Lab Status: Preliminary result Specimen: Blood from Arm, Right Updated: 12/01/21 1130     Blood Culture No growth at 2 days          XR Chest 1 View    Result Date: 12/1/2021  EXAMINATION: XR CHEST 1 VW-12/01/2021:  INDICATION: c19, hypoxia, recent pneumonia dx; R11.2-Nausea with vomiting, unspecified; U07.1-COVID-19; R09.02-Hypoxemia; R55-Syncope and collapse.  COMPARISON: 11/29/2021.  FINDINGS: Portable chest reveals mild increased markings seen in the lung bases bilaterally slightly worsening in the interval. Lung volumes are low. Cardiac and mediastinal silhouettes are within normal limits. The bony structures are unremarkable.      Impression: Mild increased markings identified in the lung bases bilaterally. Findings have slightly worsened in the interval.  D:  12/01/2021 E:  12/01/2021              Assessment/Plan   Assessment & Plan     Active Hospital Problems    Diagnosis  POA   • **Syncope [R55]  Yes   • Non-intractable vomiting [R11.10]  Yes   • Pneumonia due to COVID-19 virus [U07.1, J12.82]  Yes   • Atrial fibrillation (HCC) [I48.91]  Yes     Pneumonia due to COVID-19 with mild hypoxia  -Start dexamethasone 6mg daily x 10 days  -Start remdesivir daily x 5 doses, fewer if she improves quickly  -PRN antitussives, antiemetics, antipyretics, albuterol inhaler    Syncope  History of Atrial fibrillation and syncope  -Suspect vasovagal related to vomiting and also hypovolemia but given her cardiac history and multiple episodes of syncope today, will have cardiology see her  -Gentle IVF x 500mL    Elevated D-dimer, LLE pain  -Family history of DVT in her father  -CTA chest did not show PE 11/29.  Low threshold to repeat with any change in status  -Obtain LLE venous duplex    DVT prophylaxis:  Lovenox      CODE STATUS:    Code Status and Medical Interventions:    Ordered at: 12/01/21 1401     Code Status (Patient has no pulse and is not breathing):    CPR (Attempt to Resuscitate)     Medical Interventions (Patient has pulse or is breathing):    Full Support       Admission Status:  I believe this patient meets OBSERVATION status, however if further evaluation or treatment plans warrant, status may change.  Based upon current information, I predict patient's care encounter to be less than or equal to 2 midnights.    Nancy Ham MD  12/01/21    Electronically signed by Nancy Ham MD at 12/01/21 1612          Emergency Department Notes      Shivam Reyes MD at 12/01/21 0904          Subjective   This patient is a 36-year-old  female with a recent diagnosis of COVID-19 made last weekend.  She was seen here on Monday and diagnosed with Covid associated pneumonia.  Please refer to past medical documentation including but not limited to this last visit for details.  Patient tells me she was vaccinated for COVID-19 in the spring.  She started feeling sick last Wednesday, exactly 1 week ago.  She went to an urgent treatment center last Saturday and was diagnosed with COVID-19 and came in here Monday where she was diagnosed with pneumonia.  She was not sent home on antibiotics but tells me she received steroids.  Patient tells me she has developed nausea and vomiting since that time and had an episode of syncope today that was anxiety provoking for the patient.  Patient had a blood pressure of 70/40 according to EMS and the syncopal episode was witnessed by EMS.  Evidently, patient had another episode here while being triaged according to the patient.  Patient denies any chest pain or chest pressure.  She tells me she has been checking her oxygen levels at home with a pulse oximeter that she acquired in the last week and her oxygen levels have been in the 90% range.  Patient was found to have oxygen saturations of 90% here on room air, up to 96% on 2 L nasal  cannula oxygenation.  Patient denies any hemoptysis or hematemesis.  Patient had a CT angiogram performed 2 days ago when she was here which was negative for pulmonary embolus.  Patient denies any headache or vision changes now.  She tells me she has fevers, chills, myalgias and multiple other associated COVID-19 symptoms.  She is pleasant, oriented x4, articulate and in no acute distress at the bedside other than appearing somewhat anxious/nervous.  She is unaccompanied.    Past medical history  Fibromyalgia, depression, history of cardiac ablation 10 years ago with unknown rhythm per patient.  Past medical documentation indicates atrial fibrillation but patient states that this diagnosis is not familiar to her.  Positive hypertension.    Family history  DVT in father          Review of Systems   Constitutional: Positive for chills and fever. Negative for fatigue and unexpected weight change.   HENT: Negative for dental problem, ear pain, hearing loss and sinus pressure.    Eyes: Negative for pain and visual disturbance.   Respiratory: Negative for chest tightness and shortness of breath.    Cardiovascular: Negative for chest pain, palpitations and leg swelling.   Gastrointestinal: Positive for nausea and vomiting. Negative for blood in stool and diarrhea.   Genitourinary: Negative for difficulty urinating, dysuria, frequency, hematuria and urgency.   Musculoskeletal: Positive for arthralgias and myalgias. Negative for neck pain and neck stiffness.   Neurological: Positive for syncope. Negative for seizures, speech difficulty, light-headedness and headaches.   Psychiatric/Behavioral: Negative for confusion.   All other systems reviewed and are negative.      Past Medical History:   Diagnosis Date   • Abnormal uterine bleeding (AUB)    • Atrial fibrillation (HCC)    • Calcium deposits of brain     on MRI   • Cardiac abnormality    • Depression 12/16/2016   • Dysplasia of cervix    • Fibromyalgia 12/16/2016     probable   • Gestational diabetes     Class A1-Diet Controlled   • Hemochromatosis    • Hypertension 12/16/2016   • Inappropriate sinus tachycardia 12/16/2016    Electrophysiology study with radiofrequency ablation, June 2011 at [facility] in [place], [place], no data.   Echocardiogram February 2016: EF 60% to 65%, impaired relaxation noted. Trace MR, trace TR.    • Kidney stone    • Migraines    • Monospot test positive     History of positive Monospot.     • Nephrolithiasis    • Neurocardiogenic syncope     with tachycardia. Pt reports she had cardiac ablation in 2011.   • Steatohepatitis        No Known Allergies    Past Surgical History:   Procedure Laterality Date   • CARDIAC ABLATION  2011    bypass tract ablation   • CERVICAL CONIZATION, LEEP  2005    MODERATE DYSPLASIA   • CYSTOSCOPY URETEROSCOPY Left 7/10/2016    Procedure: CYSTOSCOPY, LEFT URETEROSCOPY, STONE EXTRACTION, LEFT URETERAL STENT INSERTION UNDER FLUROSCOPY;  Surgeon: Bernardo Simental MD;  Location: [facility];  Service:    • DILATATION AND CURETTAGE  2010   • TOTAL LAPAROSCOPIC HYSTERECTOMY Bilateral 08/19/2013    SALPINGECTOMY       Family History   Problem Relation Age of Onset   • Colon cancer Paternal Grandmother    • Heart disease Mother    • Depression Mother    • Migraines Mother    • Hypertension Father    • Diabetes Father    • Hemochromatosis Father    • Hyperlipidemia Father    • Bradycardia Brother        Social History     Socioeconomic History   • Marital status:    Tobacco Use   • Smoking status: Never Smoker   • Smokeless tobacco: Never Used   Vaping Use   • Vaping Use: Never used   Substance and Sexual Activity   • Alcohol use: No   • Drug use: No   • Sexual activity: Yes     Partners: Male     Birth control/protection: Surgical           Objective   Physical Exam  Vitals and nursing note reviewed.   Constitutional:       General: She is not in acute distress.     Appearance: Normal appearance. She is normal  weight. She is ill-appearing. She is not toxic-appearing.   HENT:      Head: Normocephalic and atraumatic.      Right Ear: External ear normal.      Left Ear: External ear normal.      Nose: Nose normal.      Mouth/Throat:      Mouth: Mucous membranes are dry.      Pharynx: Oropharynx is clear. No oropharyngeal exudate.   Eyes:      General:         Right eye: No discharge.         Left eye: No discharge.      Extraocular Movements: Extraocular movements intact.      Conjunctiva/sclera: Conjunctivae normal.      Pupils: Pupils are equal, round, and reactive to light.   Cardiovascular:      Rate and Rhythm: Normal rate and regular rhythm.      Pulses: Normal pulses.      Heart sounds: No murmur heard.      Pulmonary:      Effort: Pulmonary effort is normal.      Breath sounds: No wheezing.   Abdominal:      General: Abdomen is flat. Bowel sounds are normal. There is no distension.      Palpations: Abdomen is soft. There is no mass.      Tenderness: There is no abdominal tenderness.      Hernia: No hernia is present.   Musculoskeletal:         General: No deformity. Normal range of motion.      Cervical back: Normal range of motion and neck supple.      Right lower leg: No edema.      Left lower leg: No edema.   Skin:     Capillary Refill: Capillary refill takes less than 2 seconds.      Coloration: Skin is not jaundiced.      Findings: No bruising.   Neurological:      General: No focal deficit present.      Mental Status: She is alert and oriented to person, place, and time.   Psychiatric:         Mood and Affect: Mood normal.         Behavior: Behavior normal.         Thought Content: Thought content normal.         Procedures          ED Course  ED Course as of 12/01/21 1500   Wed Dec 01, 2021   0902 I started IV fluids on the patient and also ordered Zofran.  I also ordered supplemental oxygen for the patient to keep her oxygen saturations above 92% as she was approximately 90% when I was in the room.  We have  talked about her syncopal episodes.  Patient is a little dehydrated.  I have started rehydration as previously mentioned.  Given the hypoxia, history of recent pneumonia, and now syncopal episodes x2 in the setting of prior ablation, I do believe patient would benefit from inpatient/acute level care and will discuss with the hospitalist.  No believe she needs a cardiologist acutely at this point.  We will get an EKG and assess it as well as labs including but not limited to troponin.  With the patient's low blood pressure, history of vomiting, I believe hypovolemia or perhaps a vasovagal etiology to her syncope is a possibility.  Have discussed the differential diagnosis including cardiac dysrhythmia with the patient.  Patient is resting comfortably and agreeable to the plan.  Final impression and plan following completion of work-up. [CEFERINO]   0922 Blood pressure has come up nicely to 106/81 from 86/62.  Heart rate 96 with respirations of 18 and saturations 94% on room air. [CEFERINO]   0951 Chest x-ray has been reviewed independently by me and also read by radiology.  Official read/impression has been cut/pasted below.    IMPRESSION:  Mild increased markings identified lung bases bilaterally.  Findings of slightly worsened in the interval. [CEFERINO]   0952 CBC is pending but other labs have been resulted.  Magnesium is unremarkable at 1.7.  Troponin is less than 0.010.  CMP unremarkable with exception of a sodium of 135, calcium 8.1 and glucose 104. [CEFERINO]   0952 IV fluid rehydration continues.  Oxygen saturations are stable at this time.  I have ordered Rocephin and blood cultures on the patient given the worsening chest x-ray history of Covid associated pneumonia without antibiotics been provided previously.  I have paged the hospitalist, Dr. Ham for admission will get the patient admitted for further care.  Patient is appreciative for care and without question or complaint will be admitted accordingly. [CEFERINO]   0959 I  discussed the case personally with Dr. Ham at approximately 9:58 AM Eastern time.  We discussed antibiotic regimen, lab results, and patient's recent medical course.  Patient will be admitted accordingly. [CEFERINO]      ED Course User Index  [CEFERINO] Shivam Reyes MD      Recent Results (from the past 24 hour(s))   Comprehensive Metabolic Panel    Collection Time: 12/01/21  8:36 AM    Specimen: Blood   Result Value Ref Range    Glucose 104 (H) 65 - 99 mg/dL    BUN 9 6 - 20 mg/dL    Creatinine 0.79 0.57 - 1.00 mg/dL    Sodium 135 (L) 136 - 145 mmol/L    Potassium 4.4 3.5 - 5.2 mmol/L    Chloride 102 98 - 107 mmol/L    CO2 22.0 22.0 - 29.0 mmol/L    Calcium 8.1 (L) 8.6 - 10.5 mg/dL    Total Protein 6.2 6.0 - 8.5 g/dL    Albumin 3.70 3.50 - 5.20 g/dL    ALT (SGPT) 26 1 - 33 U/L    AST (SGOT) 29 1 - 32 U/L    Alkaline Phosphatase 86 39 - 117 U/L    Total Bilirubin 0.2 0.0 - 1.2 mg/dL    eGFR Non African Amer 82 >60 mL/min/1.73    Globulin 2.5 gm/dL    A/G Ratio 1.5 g/dL    BUN/Creatinine Ratio 11.4 7.0 - 25.0    Anion Gap 11.0 5.0 - 15.0 mmol/L   Magnesium    Collection Time: 12/01/21  8:36 AM    Specimen: Blood   Result Value Ref Range    Magnesium 1.7 1.6 - 2.6 mg/dL   Troponin    Collection Time: 12/01/21  8:36 AM    Specimen: Blood   Result Value Ref Range    Troponin T <0.010 0.000 - 0.030 ng/mL   Green Top (Gel)    Collection Time: 12/01/21  8:36 AM   Result Value Ref Range    Extra Tube Hold for add-ons.    Gold Top - SST    Collection Time: 12/01/21  8:36 AM   Result Value Ref Range    Extra Tube Hold for add-ons.    Gray Top    Collection Time: 12/01/21  8:36 AM   Result Value Ref Range    Extra Tube Hold for add-ons.    Light Blue Top    Collection Time: 12/01/21  8:36 AM   Result Value Ref Range    Extra Tube hold for add-on    Lactic Acid, Plasma    Collection Time: 12/01/21  8:36 AM    Specimen: Blood   Result Value Ref Range    Lactate 1.0 0.5 - 2.0 mmol/L   Procalcitonin    Collection Time: 12/01/21  8:36  AM    Specimen: Blood   Result Value Ref Range    Procalcitonin 0.04 0.00 - 0.25 ng/mL   C-reactive Protein    Collection Time: 12/01/21  8:36 AM    Specimen: Blood   Result Value Ref Range    C-Reactive Protein 2.62 (H) 0.00 - 0.50 mg/dL     Note: In addition to lab results from this visit, the labs listed above may include labs taken at another facility or during a different encounter within the last 24 hours. Please correlate lab times with ED admission and discharge times for further clarification of the services performed during this visit.    XR Chest 1 View   Preliminary Result   Mild increased markings identified in the lung bases   bilaterally. Findings have slightly worsened in the interval.       D:  12/01/2021   E:  12/01/2021                    Vitals:    12/01/21 1230 12/01/21 1245 12/01/21 1302 12/01/21 1329   BP: 108/68 108/68 119/80 105/65   Pulse:  100 101 109   Resp:  18     Temp:       TempSrc:       SpO2:  96% 92% 94%   Weight:       Height:         Medications   sodium chloride 0.9 % flush 10 mL (has no administration in time range)   desvenlafaxine (PRISTIQ) 24 hr tablet 100 mg (has no administration in time range)   bisoprolol (ZEBeta) tablet 10 mg (has no administration in time range)   melatonin tablet 10 mg (has no administration in time range)   pantoprazole (PROTONIX) EC tablet 40 mg (has no administration in time range)   enoxaparin (LOVENOX) syringe 40 mg (40 mg Subcutaneous Given 12/1/21 1453)   dexamethasone (DECADRON) tablet 6 mg (6 mg Oral Given 12/1/21 1453)     Or   dexamethasone (DECADRON) injection 6 mg ( Intravenous Not Given:  See Alt 12/1/21 1453)   dextromethorphan polistirex ER (DELSYM) 30 MG/5ML oral suspension 60 mg (has no administration in time range)   benzonatate (TESSALON) capsule 100 mg (has no administration in time range)   albuterol sulfate HFA (PROVENTIL HFA;VENTOLIN HFA;PROAIR HFA) inhaler 2 puff (has no administration in time range)   acetaminophen (TYLENOL)  tablet 650 mg (has no administration in time range)     Or   acetaminophen (TYLENOL) suppository 650 mg (has no administration in time range)   ondansetron (ZOFRAN) injection 4 mg (4 mg Intravenous Given 12/1/21 1453)   ketorolac (TORADOL) injection 30 mg (has no administration in time range)   promethazine (PHENERGAN) tablet 12.5 mg (has no administration in time range)     Or   promethazine (PHENERGAN) suppository 12.5 mg (has no administration in time range)   remdesivir 200 mg in sodium chloride 0.9 % 290 mL IVPB (powder vial) (has no administration in time range)     Followed by   remdesivir 100 mg in sodium chloride 0.9 % 270 mL IVPB (powder vial) (has no administration in time range)   Pharmacy Consult - Remdesivir (has no administration in time range)   prochlorperazine (COMPAZINE) injection 5 mg (has no administration in time range)     Or   prochlorperazine (COMPAZINE) tablet 5 mg (has no administration in time range)     Or   prochlorperazine (COMPAZINE) suppository 25 mg (has no administration in time range)   sodium chloride 0.9 % infusion (has no administration in time range)   prochlorperazine (COMPAZINE) injection 5 mg (has no administration in time range)   ondansetron (ZOFRAN) injection 4 mg (4 mg Intravenous Given 12/1/21 0905)   desvenlafaxine (PRISTIQ) 24 hr tablet 100 mg (100 mg Oral Given 12/1/21 0953)   cefTRIAXone (ROCEPHIN) 1 g/100 mL 0.9% NS (MBP) (0 g Intravenous Stopped 12/1/21 1212)   ondansetron (ZOFRAN) injection 4 mg (4 mg Intravenous Given 12/1/21 1124)   ketorolac (TORADOL) injection 30 mg (30 mg Intravenous Given 12/1/21 1452)     ECG/EMG Results (last 24 hours)     Procedure Component Value Units Date/Time    ECG 12 Lead [[id number]] Collected: 12/01/21 0950     Updated: 12/01/21 1052        ECG 12 Lead                                                          MDM    Final diagnoses:   Non-intractable vomiting with nausea, unspecified vomiting type   COVID-19   Hypoxia   Syncope  and collapse   Pneumonia due to COVID-19 virus   Abnormal chest x-ray   Hypocalcemia       ED Disposition  ED Disposition     ED Disposition Condition Comment    Decision to Admit  Level of Care: Telemetry [5]   Diagnosis: Non-intractable vomiting with nausea, unspecified vomiting type [[id number]]   Bed Request Comments: Needs COVID isolation, could go to    Certification: I Certify That Inpatient Hospital Services Are Medically Necessary For Greater Than 2 Midnights            No follow-up provider specified.       Medication List      No changes were made to your prescriptions during this visit.          Shivam Reyes MD  12/01/21 1501      Electronically signed by Shivam Reyes MD at 12/01/21 1500       Vital Signs (last 2 days)     Date/Time Temp Temp src Pulse Resp BP Patient Position SpO2    12/02/21 0749 -- -- 77 -- 98/77 Standing 96    12/02/21 0748 -- -- -- -- 104/77 Sitting 96    12/02/21 0747 -- -- 76 -- 99/71 Lying 96    12/02/21 0744 97.8 (36.6) Oral 72 18 85/69 Lying 96    12/02/21 0323 100 (37.8) Oral 97 18 105/72 Lying 94    12/01/21 2352 98.4 (36.9) Oral 82 18 103/63 Lying 95    12/01/21 2233 -- -- 85 -- -- -- 93    12/01/21 2044 -- -- 94 -- 106/69 -- --    12/01/21 1906 98.2 (36.8) Oral 99 16 98/64 Lying 94    12/01/21 1851 98 (36.7) -- 97 18 109/83 -- 94    12/01/21 1642 -- -- 112 18 -- -- 91    12/01/21 1329 -- -- 109 -- 105/65 -- 94    12/01/21 1302 -- -- 101 -- 119/80 -- 92    12/01/21 1245 -- -- 100 18 108/68 -- 96    12/01/21 1230 -- -- -- -- 108/68 -- --    12/01/21 1200 -- -- -- -- 108/79 -- --    12/01/21 11:54:31 -- -- 101 17 97/67 -- 97    12/01/21 1130 -- -- -- -- 97/67 -- --    12/01/21 1101 -- -- -- -- 102/72 -- --    12/01/21 10:33:02 -- -- 106 18 96/95 -- 95    12/01/21 1030 -- -- 97 -- 96/65 -- 96    12/01/21 1000 -- -- -- -- 89/55 -- --    12/01/21 0930 -- -- 96 16 90/64 -- 95    12/01/21 0925 -- -- -- -- 109/75 -- --    12/01/21 09:07:37 -- -- 96 18 106/81 -- 94     12/01/21 08:39:37 -- -- -- -- 86/62 -- --    12/01/21 0830 -- -- 96 -- 97/73 -- 93    12/01/21 0828 -- -- 96 19 -- -- 97    12/01/21 0823 97.6 (36.4) Oral 98 19 104/72 -- 95        Oxygen Therapy (last 2 days)     Date/Time SpO2 Device (Oxygen Therapy) Flow (L/min) Oxygen Concentration (%) ETCO2 (mmHg)    12/02/21 1000 -- nasal cannula 2 -- --    12/02/21 0800 -- nasal cannula 2 -- --    12/02/21 0749 96 -- -- -- --    12/02/21 0748 96 -- -- -- --    12/02/21 0747 96 -- -- -- --    12/02/21 0744 96 -- -- -- --    12/02/21 0620 -- nasal cannula 3 -- --    12/02/21 0323 94 -- -- -- --    12/02/21 0001 -- nasal cannula 3 -- --    12/01/21 2352 95 -- -- -- --    12/01/21 2233 93 -- -- -- --    12/01/21 2200 -- nasal cannula 3 -- --    12/01/21 2015 -- nasal cannula 3 -- --    12/01/21 1906 94 -- -- -- --    12/01/21 1851 94 nasal cannula 2 -- --    12/01/21 1800 -- nasal cannula 2 -- --    12/01/21 1642 91 room air -- -- --    12/01/21 1329 94 -- -- -- --    12/01/21 1302 92 -- -- -- --    12/01/21 1245 96 room air -- -- --    12/01/21 11:54:31 97 room air -- -- --    12/01/21 10:33:02 95 room air -- -- --    12/01/21 1030 96 -- -- -- --    12/01/21 0930 95 room air -- -- --    12/01/21 09:07:37 94 room air -- -- --    12/01/21 0830 93 -- -- -- --    12/01/21 0828 97 room air -- -- --    12/01/21 0823 95 room air -- -- --          Facility-Administered Medications as of 12/2/2021   Medication Dose Route Frequency Provider Last Rate Last Admin   • acetaminophen (TYLENOL) tablet 650 mg  650 mg Oral Q4H PRN Nancy Ham MD   650 mg at 12/02/21 0309    Or   • acetaminophen (TYLENOL) suppository 650 mg  650 mg Rectal Q4H PRN Nancy Ham MD       • albuterol sulfate HFA (PROVENTIL HFA;VENTOLIN HFA;PROAIR HFA) inhaler 2 puff  2 puff Inhalation Q6H PRNancy Jensen MD       • benzonatate (TESSALON) capsule 100 mg  100 mg Oral TID PRNancy Jensen MD       • bisoprolol (ZEBeta) tablet 10 mg  10 mg Oral Braxtonly Jitendra  MD Nancy   10 mg at 12/01/21 2044   • [COMPLETED] cefTRIAXone (ROCEPHIN) 1 g/100 mL 0.9% NS (MBP)  1 g Intravenous Once Shivam Reyes MD   Stopped at 12/01/21 1212   • [COMPLETED] desvenlafaxine (PRISTIQ) 24 hr tablet 100 mg  100 mg Oral Daily Shivam Reyes MD   100 mg at 12/01/21 0953   • desvenlafaxine (PRISTIQ) 24 hr tablet 100 mg  100 mg Oral Daily Nancy Ham MD   100 mg at 12/02/21 0942   • dexamethasone (DECADRON) tablet 6 mg  6 mg Oral Daily Nancy Ham MD   6 mg at 12/01/21 1453    Or   • dexamethasone (DECADRON) injection 6 mg  6 mg Intravenous Daily Nancy Ham MD   6 mg at 12/02/21 0941   • dextromethorphan polistirex ER (DELSYM) 30 MG/5ML oral suspension 60 mg  60 mg Oral Q12H PRN Nancy Ham MD       • enoxaparin (LOVENOX) syringe 40 mg  40 mg Subcutaneous Q24H Nancy Ham MD   40 mg at 12/01/21 1453   • ketorolac (TORADOL) injection 30 mg  30 mg Intravenous Q6H PRN Nancy Ham MD   30 mg at 12/02/21 0338   • [COMPLETED] ketorolac (TORADOL) injection 30 mg  30 mg Intravenous Once Nancy Ham MD   30 mg at 12/01/21 1452   • Magnesium Sulfate 2 gram Bolus, followed by 8 gram infusion (total Mg dose 10 grams)- Mg less than or equal to 1mg/dL  2 g Intravenous PRN Jorge Maldonado MD        Or   • Magnesium Sulfate 2 gram / 50mL Infusion (GIVE X 3 BAGS TO EQUAL 6GM TOTAL DOSE) - Mg 1.1 - 1.5 mg/dl  2 g Intravenous PRN Jorge Maldonado MD        Or   • Magnesium Sulfate 4 gram infusion- Mg 1.6-1.9 mg/dL  4 g Intravenous PRN Jorge Maldonado MD       • melatonin tablet 10 mg  10 mg Oral Nightly Nancy Ham MD   10 mg at 12/01/21 2045   • [COMPLETED] ondansetron (ZOFRAN) injection 4 mg  4 mg Intravenous Once Shivam Reyes MD   4 mg at 12/01/21 0905   • [COMPLETED] ondansetron (ZOFRAN) injection 4 mg  4 mg Intravenous Once Shivam Reyes MD   4 mg at 12/01/21 1124   • ondansetron (ZOFRAN) injection 4 mg  4 mg Intravenous Q6H PRN Nancy Ham MD    4 mg at 12/01/21 2045   • pantoprazole (PROTONIX) EC tablet 40 mg  40 mg Oral Q AM Nancy Ham MD   40 mg at 12/02/21 0309   • Pharmacy Consult - Remdesivir   Does not apply Continuous PRN Nancy Ham MD       • prochlorperazine (COMPAZINE) injection 5 mg  5 mg Intravenous Q6H PRN Nancy Ham MD   5 mg at 12/02/21 0941    Or   • prochlorperazine (COMPAZINE) tablet 5 mg  5 mg Oral Q6H PRN Nancy Ham MD        Or   • prochlorperazine (COMPAZINE) suppository 25 mg  25 mg Rectal Q12H PRN Nancy Ham MD       • [COMPLETED] prochlorperazine (COMPAZINE) injection 5 mg  5 mg Intravenous Once Nancy Ham MD   5 mg at 12/01/21 1656   • promethazine (PHENERGAN) tablet 12.5 mg  12.5 mg Oral Q6H PRN Nancy Ham MD   12.5 mg at 12/02/21 0338    Or   • promethazine (PHENERGAN) suppository 12.5 mg  12.5 mg Rectal Q6H PRN Nancy Ham MD       • [COMPLETED] remdesivir 200 mg in sodium chloride 0.9 % 290 mL IVPB (powder vial)  200 mg Intravenous Q24H Nancy Ham MD   200 mg at 12/01/21 1656    Followed by   • remdesivir 100 mg in sodium chloride 0.9 % 270 mL IVPB (powder vial)  100 mg Intravenous Q24H Nancy Ham MD       • sodium chloride 0.9 % flush 10 mL  10 mL Intravenous PRN Shivam Reyes MD       • sodium chloride 0.9 % infusion  100 mL/hr Intravenous Continuous Nancy Ham  mL/hr at 12/01/21 1743 100 mL/hr at 12/01/21 1743     Lab Results (last 48 hours)     Procedure Component Value Units Date/Time    Blood Culture - Blood, Hand, Left [[id number]]  (Normal) Collected: 12/01/21 1118    Specimen: Blood from Hand, Left Updated: 12/02/21 1145     Blood Culture No growth at 24 hours    Blood Culture - Blood, Arm, Left [[id number]]  (Normal) Collected: 12/01/21 1100    Specimen: Blood from Arm, Left Updated: 12/02/21 1145     Blood Culture No growth at 24 hours    Magnesium [[id number]] Collected: 12/02/21 0431    Specimen: Blood Updated: 12/02/21 1119    Comprehensive Metabolic Panel  [[id number]]  (Abnormal) Collected: 12/02/21 0431    Specimen: Blood Updated: 12/02/21 0506     Glucose 122 mg/dL      BUN 10 mg/dL      Creatinine 0.70 mg/dL      Sodium 139 mmol/L      Potassium 4.1 mmol/L      Comment: Slight hemolysis detected by analyzer. Results may be affected.        Chloride 105 mmol/L      CO2 21.0 mmol/L      Calcium 8.1 mg/dL      Total Protein 6.5 g/dL      Albumin 3.80 g/dL      ALT (SGPT) 22 U/L      AST (SGOT) 22 U/L      Alkaline Phosphatase 81 U/L      Total Bilirubin <0.2 mg/dL      eGFR Non African Amer 94 mL/min/1.73      Globulin 2.7 gm/dL      Comment: Calculated Result        A/G Ratio 1.4 g/dL      BUN/Creatinine Ratio 14.3     Anion Gap 13.0 mmol/L     Narrative:      GFR Normal >60  Chronic Kidney Disease <60  Kidney Failure <15      C-reactive Protein [[id number]]  (Abnormal) Collected: 12/02/21 0431    Specimen: Blood Updated: 12/02/21 0506     C-Reactive Protein 3.45 mg/dL     CBC & Differential [[id number]]  (Abnormal) Collected: 12/02/21 0357    Specimen: Blood Updated: 12/02/21 0413    Narrative:      The following orders were created for panel order CBC & Differential.  Procedure                               Abnormality         Status                     ---------                               -----------         ------                     CBC Auto Differential[[id number]]        Abnormal            Final result               Scan Slide[[id number]]                                                                    Please view results for these tests on the individual orders.    CBC Auto Differential [[id number]]  (Abnormal) Collected: 12/02/21 0357    Specimen: Blood Updated: 12/02/21 0413     WBC 4.78 10*3/mm3      RBC 4.23 10*6/mm3      Hemoglobin 12.8 g/dL      Hematocrit 37.7 %      MCV 89.1 fL      MCH 30.3 pg      MCHC 34.0 g/dL      RDW 12.3 %      RDW-SD 40.4 fl      MPV 10.2 fL      Platelets 127 10*3/mm3      Neutrophil % 67.0 %      Lymphocyte % 25.9 %       Monocyte % 6.9 %      Eosinophil % 0.0 %      Basophil % 0.0 %      Immature Grans % 0.2 %      Neutrophils, Absolute 3.20 10*3/mm3      Lymphocytes, Absolute 1.24 10*3/mm3      Monocytes, Absolute 0.33 10*3/mm3      Eosinophils, Absolute 0.00 10*3/mm3      Basophils, Absolute 0.00 10*3/mm3      Immature Grans, Absolute 0.01 10*3/mm3      nRBC 0.0 /100 WBC     COVID-19, [facility] IN-HOUSE NP/OP SWAB IN UTM/VTM/SALINE TRANSPORT MEDIA 24HR TAT - Swab, Nasopharynx [[id number]]  (Abnormal) Collected: 12/01/21 1413    Specimen: Swab from Nasopharynx Updated: 12/01/21 2158     COVID19 Detected    Narrative:      Fact sheet for providers: https://www.fda.gov/media/553738/download     Fact sheet for patients: https://www.fda.gov/media/731582/download    Test performed by RT PCR.    [place] Draw [[id number]] Collected: 12/01/21 0836    Specimen: Blood Updated: 12/01/21 1815    Narrative:      The following orders were created for panel order [place] Draw.  Procedure                               Abnormality         Status                     ---------                               -----------         ------                     Green Top (Gel)[[id number]]                                  Final result               Lavender Top[[id number]]                                     Final result               Gold Top - SST[[id number]]                                   Final result               Gray Top[[id number]]                                         Final result               Light Blue Top[[id number]]                                   Final result                 Please view results for these tests on the individual orders.    Lavender Top [[id number]] Collected: 12/01/21 1705    Specimen: Blood Updated: 12/01/21 1815     Extra Tube hold for add-on     Comment: Auto resulted       CBC & Differential [[id number]]  (Abnormal) Collected: 12/01/21 1705    Specimen: Blood Updated: 12/01/21 1734    Narrative:      The following  orders were created for panel order CBC & Differential.  Procedure                               Abnormality         Status                     ---------                               -----------         ------                     CBC Auto Differential[[id number]]        Abnormal            Final result               Scan Slide[[id number]]                   Normal              Final result                 Please view results for these tests on the individual orders.    Scan Slide [[id number]]  (Normal) Collected: 12/01/21 1705    Specimen: Blood Updated: 12/01/21 1734     RBC Morphology Normal     WBC Morphology Normal     Platelet Morphology Normal    CBC Auto Differential [[id number]]  (Abnormal) Collected: 12/01/21 1705    Specimen: Blood Updated: 12/01/21 1734     WBC 4.19 10*3/mm3      RBC 4.46 10*6/mm3      Hemoglobin 13.6 g/dL      Hematocrit 39.2 %      MCV 87.9 fL      MCH 30.5 pg      MCHC 34.7 g/dL      RDW 12.3 %      RDW-SD 39.8 fl      MPV 9.9 fL      Platelets 109 10*3/mm3      Neutrophil % 61.2 %      Lymphocyte % 30.8 %      Monocyte % 7.6 %      Eosinophil % 0.0 %      Basophil % 0.2 %      Immature Grans % 0.2 %      Neutrophils, Absolute 2.56 10*3/mm3      Lymphocytes, Absolute 1.29 10*3/mm3      Monocytes, Absolute 0.32 10*3/mm3      Eosinophils, Absolute 0.00 10*3/mm3      Basophils, Absolute 0.01 10*3/mm3      Immature Grans, Absolute 0.01 10*3/mm3      nRBC 0.0 /100 WBC     D-dimer, Quantitative [[id number]]  (Abnormal) Collected: 12/01/21 1434    Specimen: Blood Updated: 12/01/21 1504     D-Dimer, Quantitative 0.80 MCGFEU/mL     Narrative:      The D-Dimer assay test is to be used in conjunction with a clinical pretest probability (PTP) assessment model, and has been approved by the FDA to exclude pulmonary embolism (PE) and deep venous thrombosis (DVT) in outpatients suspected of PE or DVT, with a cutoff of 0.5 MCGFEU/mL.    C-reactive Protein [[id number]]  (Abnormal) Collected: 12/01/21 0889     "Specimen: Blood Updated: 12/01/21 1421     C-Reactive Protein 2.62 mg/dL     Procalcitonin [[id number]]  (Normal) Collected: 12/01/21 0836    Specimen: Blood Updated: 12/01/21 1351     Procalcitonin 0.04 ng/mL     Narrative:      As a Marker for Sepsis (Non-Neonates):     1. <0.5 ng/mL represents a low risk of severe sepsis and/or septic shock.  2. >2 ng/mL represents a high risk of severe sepsis and/or septic shock.    As a Marker for Lower Respiratory Tract Infections that require antibiotic therapy:  PCT on Admission     Antibiotic Therapy             6-12 Hrs later  >0.5                          Strongly Recommended            >0.25 - <0.5             Recommended  0.1 - 0.25                  Discouraged                       Remeasure/reassess PCT  <0.1                         Strongly Discouraged         Remeasure/reassess PCT      As 28 day mortality risk marker: \"Change in Procalcitonin Result\" (>80% or <=80%) if Day 0 (or Day 1) and Day 4 values are available. Refer to http://www.[employer]TO[employer]-pct-calculator.com/    Change in PCT <=80 %   A decrease of PCT levels below or equal to 80% defines a positive change in PCT test result representing a higher risk for 28-day all-cause mortality of patients diagnosed with severe sepsis or septic shock.    Change in PCT >80 %   A decrease of PCT levels of more than 80% defines a negative change in PCT result representing a lower risk for 28-day all-cause mortality of patients diagnosed with severe sepsis or septic shock.                Kingsley Top [[id number]] Collected: 12/01/21 0836    Specimen: Blood Updated: 12/01/21 1245     Extra Tube Hold for add-ons.     Comment: Auto resulted.       Lactic Acid, Plasma [[id number]]  (Normal) Collected: 12/01/21 0836    Specimen: Blood Updated: 12/01/21 1009     Lactate 1.0 mmol/L      Comment: Falsely depressed results may occur on samples drawn from patients receiving N-Acetylcysteine (NAC) or Metamizole.       Green Top (Gel) [[id number]] " Collected: 12/01/21 0836    Specimen: Blood Updated: 12/01/21 0945     Extra Tube Hold for add-ons.     Comment: Auto resulted.       Gold Top - SST [[id number]] Collected: 12/01/21 0836    Specimen: Blood Updated: 12/01/21 0945     Extra Tube Hold for add-ons.     Comment: Auto resulted.       Light Blue Top [[id number]] Collected: 12/01/21 0836    Specimen: Blood Updated: 12/01/21 0945     Extra Tube hold for add-on     Comment: Auto resulted       Comprehensive Metabolic Panel [[id number]]  (Abnormal) Collected: 12/01/21 0836    Specimen: Blood Updated: 12/01/21 0941     Glucose 104 mg/dL      BUN 9 mg/dL      Creatinine 0.79 mg/dL      Sodium 135 mmol/L      Potassium 4.4 mmol/L      Comment: Slight hemolysis detected by analyzer. Results may be affected.        Chloride 102 mmol/L      CO2 22.0 mmol/L      Calcium 8.1 mg/dL      Total Protein 6.2 g/dL      Albumin 3.70 g/dL      ALT (SGPT) 26 U/L      AST (SGOT) 29 U/L      Alkaline Phosphatase 86 U/L      Total Bilirubin 0.2 mg/dL      eGFR Non African Amer 82 mL/min/1.73      Globulin 2.5 gm/dL      Comment: Calculated Result        A/G Ratio 1.5 g/dL      BUN/Creatinine Ratio 11.4     Anion Gap 11.0 mmol/L     Narrative:      GFR Normal >60  Chronic Kidney Disease <60  Kidney Failure <15      Magnesium [[id number]]  (Normal) Collected: 12/01/21 0836    Specimen: Blood Updated: 12/01/21 0937     Magnesium 1.7 mg/dL     Troponin [[id number]]  (Normal) Collected: 12/01/21 0836    Specimen: Blood Updated: 12/01/21 0912     Troponin T <0.010 ng/mL     Narrative:      Troponin T Reference Range:  <= 0.03 ng/mL-   Negative for AMI  >0.03 ng/mL-     Abnormal for myocardial necrosis.  Clinicians would have to utilize clinical acumen, EKG, Troponin and serial changes to determine if it is an Acute Myocardial Infarction or myocardial injury due to an underlying chronic condition.       Results may be falsely decreased if patient taking Biotin.            Imaging Results  (Last 48 Hours)     Procedure Component Value Units Date/Time    XR Chest 1 View [[id number]] Collected: 12/01/21 0924     Updated: 12/01/21 1656    Narrative:      EXAMINATION: XR CHEST 1 VW-12/01/2021:      INDICATION: c19, hypoxia, recent pneumonia dx; R11.2-Nausea with  vomiting, unspecified; U07.1-COVID-19; R09.02-Hypoxemia; R55-Syncope and  collapse.      COMPARISON: 11/29/2021.     FINDINGS: Portable chest reveals mild increased markings seen in the  lung bases bilaterally slightly worsening in the interval. Lung volumes  are low. Cardiac and mediastinal silhouettes are within normal limits.  The bony structures are unremarkable.       Impression:      Mild increased markings identified in the lung bases  bilaterally. Findings have slightly worsened in the interval.     D:  12/01/2021  E:  12/01/2021     This report was finalized on 12/1/2021 4:53 PM by Dr. Marguerite Alexander MD.             Physician Progress Notes (last 48 hours)  Notes from 11/30/21 1154 through 12/02/21 1154   No notes of this type exist for this encounter.          Consult Notes (last 48 hours)      Chris Jeff MD at 12/02/21 0952      Consult Orders    1. Inpatient Cardiology Consult [[id number]] ordered by Nancy Ham MD at 12/01/21 1346               [facility]  Cardiovascular Consultation Note    Reason for consultation: Syncope    History of Present Illness:  36-year-old female with prior inappropriate sinus tachycardia status post ablation in [place] in 2011, history of vasovagal syncope, hemochromatosis, depression, kidney stones recently diagnosed with Covid.  She came into the hospital after 2 syncopal episodes.  The first syncopal episode occurred while her and her  are on route to get antibiotic treatment.  She became quite nauseated and pulled over the side of the road and vomited them when she was in the car she passed out.  She had an episode of syncope here after  intense heaving and vomiting.  She states otherwise she does pretty well.  Her heart occasionally races but no big deal.  She denies any dyspnea on exertion no exertional chest pain.  She has been dealing with vasovagal syncope since she was 19 years ago.    Cardiac risk factors: None    Past medical and surgical history, social and family history reviewed in EMR.    REVIEW OF SYSTEMS:     Past Medical History:   Diagnosis Date   • Abnormal uterine bleeding (AUB)    • Atrial fibrillation (HCC)    • Calcium deposits of brain     on MRI   • Cardiac abnormality    • Depression 12/16/2016   • Dysplasia of cervix    • Fibromyalgia 12/16/2016    probable   • Gestational diabetes     Class A1-Diet Controlled   • Hemochromatosis    • Hypertension 12/16/2016   • Inappropriate sinus tachycardia 12/16/2016    Electrophysiology study with radiofrequency ablation, June 2011 at [facility] in [place], [place], no data.   Echocardiogram February 2016: EF 60% to 65%, impaired relaxation noted. Trace MR, trace TR.    • Kidney stone    • Migraines    • Monospot test positive     History of positive Monospot.     • Nephrolithiasis    • Neurocardiogenic syncope     with tachycardia. Pt reports she had cardiac ablation in 2011.   • Steatohepatitis      Past Surgical History:   Procedure Laterality Date   • CARDIAC ABLATION  2011    bypass tract ablation   • CERVICAL CONIZATION, LEEP  2005    MODERATE DYSPLASIA   • CYSTOSCOPY URETEROSCOPY Left 7/10/2016    Procedure: CYSTOSCOPY, LEFT URETEROSCOPY, STONE EXTRACTION, LEFT URETERAL STENT INSERTION UNDER FLUROSCOPY;  Surgeon: Bernardo Simental MD;  Location: [facility];  Service:    • DILATATION AND CURETTAGE  2010   • TOTAL LAPAROSCOPIC HYSTERECTOMY Bilateral 08/19/2013    SALPINGECTOMY     Social History     Socioeconomic History   • Marital status:    Tobacco Use   • Smoking status: Never Smoker   • Smokeless tobacco: Never Used   Vaping Use   • Vaping Use: Never used    Substance and Sexual Activity   • Alcohol use: No   • Drug use: No   • Sexual activity: Yes     Partners: Male     Birth control/protection: Surgical     Family History   Problem Relation Age of Onset   • Colon cancer Paternal Grandmother    • Heart disease Mother    • Depression Mother    • Migraines Mother    • Hypertension Father    • Diabetes Father    • Hemochromatosis Father    • Hyperlipidemia Father    • Bradycardia Brother        H&P ROS reviewed and pertinent CV ROS as noted in HPI.    Cardiac: Has had inappropriate sinus tachycardia and successful ablation 2011  Respiratory: No dyspnea no wheezing.  However with the Covid symptoms she has noticed some dyspnea on exertion  Lower Extremities: No swelling or lesions  GI: Complains of nausea and vomiting  Neuro: No stroke TIA or neurologic event  Hematology: No bleeding diathesis ecchymosis or petechia  Renal: No hematuria or dysuria  Musculoskeletal: No joint pain  Endocrine: Does not have diabetes or hypothyroidism  Constitutional: Has had cough shortness of breath fever  Psych: No depression or suicidal ideation      Physical Exam: General Pleasant obese female in bed receiving a lower extremity Doppler dyspneic not tachypneic current heart rate 95       HEENT: No JVP or bruits       Respiratory: Clear anteriorly       Cardiovascular: Regular rate and rhythm without murmur gallop or click and no edema to palpation       GI: Soft       Lower Extremities: No lesions       Neuro: Facial expressions are symmetrical       Skin: Warm and dry       Psych: Pleasant affect    Results Review: Review of telemetry shows no bradycardia.  She is having episodes of sinus tachycardia but no supraventricular ventricular arrhythmias.  She has no normal prior ejection fraction blood work is significant for an elevated CRP, D-dimer.  EKG shows no ischemia      Objective     Vital Sign Min/Max for last 24 hours  Temp  Min: 97.8 °F (36.6 °C)  Max: 100 °F (37.8 °C)   BP  Min:  85/69  Max: 119/80   Pulse  Min: 72  Max: 112   Resp  Min: 16  Max: 18   SpO2  Min: 91 %  Max: 97 %   No data recorded      Intake/Output Summary (Last 24 hours) at 12/2/2021 0952  Last data filed at 12/1/2021 1212  Gross per 24 hour   Intake 100 ml   Output --   Net 100 ml             Current Facility-Administered Medications:   •  acetaminophen (TYLENOL) tablet 650 mg, 650 mg, Oral, Q4H PRN, 650 mg at 12/02/21 0309 **OR** acetaminophen (TYLENOL) suppository 650 mg, 650 mg, Rectal, Q4H PRN, Nancy Ham MD  •  albuterol sulfate HFA (PROVENTIL HFA;VENTOLIN HFA;PROAIR HFA) inhaler 2 puff, 2 puff, Inhalation, Q6H PRN, Nancy Ham MD  •  benzonatate (TESSALON) capsule 100 mg, 100 mg, Oral, TID PRN, Nancy Ham MD  •  bisoprolol (ZEBeta) tablet 10 mg, 10 mg, Oral, Nightly, Nancy Ham MD, 10 mg at 12/01/21 2044  •  desvenlafaxine (PRISTIQ) 24 hr tablet 100 mg, 100 mg, Oral, Daily, Nancy Ham MD, 100 mg at 12/02/21 0942  •  dexamethasone (DECADRON) tablet 6 mg, 6 mg, Oral, Daily, 6 mg at 12/01/21 1453 **OR** dexamethasone (DECADRON) injection 6 mg, 6 mg, Intravenous, Daily, Nancy Ham MD, 6 mg at 12/02/21 0941  •  dextromethorphan polistirex ER (DELSYM) 30 MG/5ML oral suspension 60 mg, 60 mg, Oral, Q12H PRN, Nancy Ham MD  •  enoxaparin (LOVENOX) syringe 40 mg, 40 mg, Subcutaneous, Q24H, Nancy Ham MD, 40 mg at 12/01/21 1453  •  ketorolac (TORADOL) injection 30 mg, 30 mg, Intravenous, Q6H PRN, Nancy Ham MD, 30 mg at 12/02/21 0338  •  melatonin tablet 10 mg, 10 mg, Oral, Nightly, Nancy Ham MD, 10 mg at 12/01/21 2045  •  ondansetron (ZOFRAN) injection 4 mg, 4 mg, Intravenous, Q6H PRN, Nancy Ham MD, 4 mg at 12/01/21 2045  •  pantoprazole (PROTONIX) EC tablet 40 mg, 40 mg, Oral, Q AM, Nancy Ham MD, 40 mg at 12/02/21 0309  •  Pharmacy Consult - Remdesivir, , Does not apply, Continuous PRN, Nancy Ham MD  •  prochlorperazine (COMPAZINE) injection 5 mg, 5 mg, Intravenous, Q6H  PRN, 5 mg at 12/02/21 0941 **OR** prochlorperazine (COMPAZINE) tablet 5 mg, 5 mg, Oral, Q6H PRN **OR** prochlorperazine (COMPAZINE) suppository 25 mg, 25 mg, Rectal, Q12H PRN, Nancy Ham MD  •  promethazine (PHENERGAN) tablet 12.5 mg, 12.5 mg, Oral, Q6H PRN, 12.5 mg at 12/02/21 0338 **OR** promethazine (PHENERGAN) suppository 12.5 mg, 12.5 mg, Rectal, Q6H PRN, Nancy Ham MD  •  [COMPLETED] remdesivir 200 mg in sodium chloride 0.9 % 290 mL IVPB (powder vial), 200 mg, Intravenous, Q24H, 200 mg at 12/01/21 1656 **FOLLOWED BY** remdesivir 100 mg in sodium chloride 0.9 % 270 mL IVPB (powder vial), 100 mg, Intravenous, Q24H, Nancy Ham MD  •  sodium chloride 0.9 % flush 10 mL, 10 mL, Intravenous, PRN, Shivam Reyes MD  •  sodium chloride 0.9 % infusion, 100 mL/hr, Intravenous, Continuous, Nancy Ham MD, Last Rate: 100 mL/hr at 12/01/21 1743, 100 mL/hr at 12/01/21 1743    Lab Review:   Results from last 7 days   Lab Units 12/02/21  0357 12/01/21  1705 11/29/21  1047   WBC 10*3/mm3 4.78 4.19 4.29   HEMOGLOBIN g/dL 12.8 13.6 15.0   PLATELETS 10*3/mm3 127* 109* 127*     Results from last 7 days   Lab Units 12/02/21  0431 12/01/21  0836 11/29/21  1047   SODIUM mmol/L 139 135* 138   POTASSIUM mmol/L 4.1 4.4 3.9   CO2 mmol/L 21.0* 22.0 22.0   BUN mg/dL 10 9 10   CREATININE mg/dL 0.70 0.79 0.76   MAGNESIUM mg/dL  --  1.7 1.9   GLUCOSE mg/dL 122* 104* 123*     Estimated Creatinine Clearance: 114.6 mL/min (by C-G formula based on SCr of 0.7 mg/dL).        .lipd  Lab Results   Component Value Date    TRIG 126 01/22/2021    HDL 59 01/22/2021    AST 22 12/02/2021    ALT 22 12/02/2021       Radiology Reports:  Imaging Results (Last 72 Hours)     Procedure Component Value Units Date/Time    XR Chest 1 View [[id number]] Collected: 12/01/21 0924     Updated: 12/01/21 1656    Narrative:      EXAMINATION: XR CHEST 1 VW-12/01/2021:      INDICATION: c19, hypoxia, recent pneumonia dx; R11.2-Nausea with  vomiting,  unspecified; U07.1-COVID-19; R09.02-Hypoxemia; R55-Syncope and  collapse.      COMPARISON: 11/29/2021.     FINDINGS: Portable chest reveals mild increased markings seen in the  lung bases bilaterally slightly worsening in the interval. Lung volumes  are low. Cardiac and mediastinal silhouettes are within normal limits.  The bony structures are unremarkable.       Impression:      Mild increased markings identified in the lung bases  bilaterally. Findings have slightly worsened in the interval.     D:  12/01/2021  E:  12/01/2021     This report was finalized on 12/1/2021 4:53 PM by Dr. Marguerite Alexander MD.             Assessment/Plan: 1 vasovagal syncopal episodes x2 following profound nausea and vomiting with presignificant retching during the vomiting.  She is not had any other episodes leading up to this acute Covid infection.  We will check an echocardiogram to make sure there is no involvement of the myocardium with Covid but otherwise there is nothing else to add.  2 tachycardia-resume beta-blocker if blood pressure allows      Chris Jeff MD  12/02/21  09:52 EST      Electronically signed by Chris Jeff MD at 12/02/21 1027

## 2021-12-02 NOTE — PLAN OF CARE
Goal Outcome Evaluation:    Pt on RA, Pt had uneventful evening rested peacefully, No voiced complaints at this time.

## 2021-12-03 LAB
CRP SERPL-MCNC: 1.71 MG/DL (ref 0–0.5)
PHOSPHATE SERPL-MCNC: 2.3 MG/DL (ref 2.5–4.5)
PROCALCITONIN SERPL-MCNC: 0.06 NG/ML (ref 0–0.25)
QT INTERVAL: 372 MS
QT INTERVAL: 380 MS
QTC INTERVAL: 432 MS
QTC INTERVAL: 457 MS
TROPONIN T SERPL-MCNC: <0.01 NG/ML (ref 0–0.03)

## 2021-12-03 PROCEDURE — 63710000001 DEXAMETHASONE PER 0.25 MG: Performed by: INTERNAL MEDICINE

## 2021-12-03 PROCEDURE — 63710000001 PROMETHAZINE PER 12.5 MG: Performed by: INTERNAL MEDICINE

## 2021-12-03 PROCEDURE — 84484 ASSAY OF TROPONIN QUANT: CPT | Performed by: INTERNAL MEDICINE

## 2021-12-03 PROCEDURE — 93010 ELECTROCARDIOGRAM REPORT: CPT | Performed by: INTERNAL MEDICINE

## 2021-12-03 PROCEDURE — 25010000002 ONDANSETRON PER 1 MG: Performed by: INTERNAL MEDICINE

## 2021-12-03 PROCEDURE — 94640 AIRWAY INHALATION TREATMENT: CPT

## 2021-12-03 PROCEDURE — 86140 C-REACTIVE PROTEIN: CPT | Performed by: INTERNAL MEDICINE

## 2021-12-03 PROCEDURE — 84145 PROCALCITONIN (PCT): CPT | Performed by: INTERNAL MEDICINE

## 2021-12-03 PROCEDURE — 93005 ELECTROCARDIOGRAM TRACING: CPT | Performed by: INTERNAL MEDICINE

## 2021-12-03 PROCEDURE — 99232 SBSQ HOSP IP/OBS MODERATE 35: CPT | Performed by: INTERNAL MEDICINE

## 2021-12-03 PROCEDURE — 25010000002 ENOXAPARIN PER 10 MG: Performed by: INTERNAL MEDICINE

## 2021-12-03 PROCEDURE — 84100 ASSAY OF PHOSPHORUS: CPT | Performed by: INTERNAL MEDICINE

## 2021-12-03 PROCEDURE — 25010000002 MORPHINE PER 10 MG: Performed by: INTERNAL MEDICINE

## 2021-12-03 RX ORDER — MORPHINE SULFATE 2 MG/ML
1 INJECTION, SOLUTION INTRAMUSCULAR; INTRAVENOUS ONCE
Status: COMPLETED | OUTPATIENT
Start: 2021-12-03 | End: 2021-12-03

## 2021-12-03 RX ORDER — SODIUM CHLORIDE, SODIUM LACTATE, POTASSIUM CHLORIDE, CALCIUM CHLORIDE 600; 310; 30; 20 MG/100ML; MG/100ML; MG/100ML; MG/100ML
75 INJECTION, SOLUTION INTRAVENOUS CONTINUOUS
Status: ACTIVE | OUTPATIENT
Start: 2021-12-03 | End: 2021-12-04

## 2021-12-03 RX ORDER — ASPIRIN 81 MG/1
325 TABLET, CHEWABLE ORAL ONCE
Status: COMPLETED | OUTPATIENT
Start: 2021-12-03 | End: 2021-12-03

## 2021-12-03 RX ORDER — ASPIRIN 81 MG/1
325 TABLET, CHEWABLE ORAL DAILY
Status: DISCONTINUED | OUTPATIENT
Start: 2021-12-03 | End: 2021-12-03

## 2021-12-03 RX ORDER — MORPHINE SULFATE 2 MG/ML
1 INJECTION, SOLUTION INTRAMUSCULAR; INTRAVENOUS EVERY 4 HOURS PRN
Status: DISCONTINUED | OUTPATIENT
Start: 2021-12-03 | End: 2021-12-03

## 2021-12-03 RX ADMIN — PROMETHAZINE HYDROCHLORIDE 12.5 MG: 12.5 TABLET ORAL at 14:14

## 2021-12-03 RX ADMIN — ACETAMINOPHEN 650 MG: 325 TABLET, FILM COATED ORAL at 21:14

## 2021-12-03 RX ADMIN — SODIUM CHLORIDE, POTASSIUM CHLORIDE, SODIUM LACTATE AND CALCIUM CHLORIDE 75 ML/HR: 600; 310; 30; 20 INJECTION, SOLUTION INTRAVENOUS at 16:54

## 2021-12-03 RX ADMIN — ONDANSETRON 4 MG: 2 INJECTION INTRAMUSCULAR; INTRAVENOUS at 08:09

## 2021-12-03 RX ADMIN — PANTOPRAZOLE SODIUM 40 MG: 40 TABLET, DELAYED RELEASE ORAL at 05:09

## 2021-12-03 RX ADMIN — PROMETHAZINE HYDROCHLORIDE 12.5 MG: 12.5 TABLET ORAL at 21:11

## 2021-12-03 RX ADMIN — ENOXAPARIN SODIUM 40 MG: 40 INJECTION SUBCUTANEOUS at 14:15

## 2021-12-03 RX ADMIN — REMDESIVIR 100 MG: 100 INJECTION, POWDER, LYOPHILIZED, FOR SOLUTION INTRAVENOUS at 14:14

## 2021-12-03 RX ADMIN — ALBUTEROL SULFATE 2 PUFF: 90 AEROSOL, METERED RESPIRATORY (INHALATION) at 04:22

## 2021-12-03 RX ADMIN — DESVENLAFAXINE SUCCINATE 100 MG: 50 TABLET, EXTENDED RELEASE ORAL at 08:09

## 2021-12-03 RX ADMIN — Medication 10 MG: at 21:11

## 2021-12-03 RX ADMIN — DEXAMETHASONE 6 MG: 2 TABLET ORAL at 08:09

## 2021-12-03 RX ADMIN — MORPHINE SULFATE 1 MG: 2 INJECTION, SOLUTION INTRAMUSCULAR; INTRAVENOUS at 05:11

## 2021-12-03 RX ADMIN — ASPIRIN 325 MG: 81 TABLET, CHEWABLE ORAL at 05:09

## 2021-12-03 RX ADMIN — SODIUM CHLORIDE, POTASSIUM CHLORIDE, SODIUM LACTATE AND CALCIUM CHLORIDE 100 ML/HR: 600; 310; 30; 20 INJECTION, SOLUTION INTRAVENOUS at 03:02

## 2021-12-03 RX ADMIN — POTASSIUM PHOSPHATE, MONOBASIC POTASSIUM PHOSPHATE, DIBASIC 21 MMOL: 224; 236 INJECTION, SOLUTION, CONCENTRATE INTRAVENOUS at 09:27

## 2021-12-03 NOTE — CASE MANAGEMENT/SOCIAL WORK
Continued Stay Note  [facility]     Patient Name: Cristina Haiens  MRN: [id number]  Today's Date: 12/3/2021    Admit Date: 12/1/2021     Discharge Plan     Row Name 12/03/21 8558       Plan    Plan Comments Pt continues to have episodes of vomiting. She currently denies any discharge needs and plans on returning home at discharge. Cm to follow.               Discharge Codes    No documentation.                     Nabila Samayoa RN

## 2021-12-03 NOTE — NURSING NOTE
Patient c/o restless legs/ tingling 15 mins after administration of Toradol pt stated that she feels like she's having a reaction to the Toradol. Patient is also c/o dizziness and lightheadness. Provider notified and stated to keep patient in bed at this time.     Pt also c/o nausea and vomiting of which she states she has syncopal episodes following, provider also made aware of this.     After getting off the phone with the provider patient called out c/o the tingling/ restlessness that she had after the Toradol has moved up to her arms as well as her chest causing a squeezing/ pressure feeling rating it a 4/10, a rapid response was then called.     12 lead ekg showed NSR.    Labs and chest xray currently pending.

## 2021-12-03 NOTE — PROGRESS NOTES
[facility] Medicine Services  PROGRESS NOTE    Patient Name: Cristina Haines  : 1985  MRN: [id number]    Date of Admission: 2021  Primary Care Physician: Rahat Mercedes MD    Subjective   Subjective     CC:  Syncope and shortness of breath    HPI:  I have seen and evaluated the patient this morning.  Chart reviewed and events noted.  Reported that she is feeling better today.  Vomited only 3 times yesterday but did not have any syncopal episode in association with dose episodes.  Shortness of breath is improving.  Oxygen requirement slightly increased to 3 L/min and currently oxygen saturation is 95%.    ROS:  10 point review of systems is negative except for what is mentioned in HPI    Objective   Objective     Vital Signs:   Temp:  [97.7 °F (36.5 °C)-98.4 °F (36.9 °C)] 97.7 °F (36.5 °C)  Heart Rate:  [] 81  Resp:  [16-18] 18  BP: ()/(65-98) 105/83  Flow (L/min):  [2] 2     Physical Exam:  General: Comfortable, not in any acute distress, appears stated age, conversant and cooperative  Head: Atraumatic and normocephalic, without obvious abnormality  Eyes:   Conjunctivae and sclerae normal, no Icterus. No pallor  Ears:  Ears appear intact with no abnormalities noted  Throat: No oral lesions, no thrush, oral mucosa moist  Neck: Supple, trachea midline, no thyromegaly  Back:   No kyphoscoliosis present. No tenderness to palpation,   no sacral edema  Lungs: Clear to auscultation bilaterally, equal air entry, no wheezing or crackles  Heart:  Normal S1 and S2, no murmur, no gallop, No JVD, no lower extremity swelling  Abdomen:  Soft, no tenderness, no organomegaly, normal bowel sounds  Normal bowel sounds, no masses, no organomegaly. Soft, nontender, nondistended, no guarding, no rebound tenderness.  Extremities: No gross abnormalities, no clubbing, pulses palpable and equal bilaterally  Skin: No bleeding, bruising or rash, normal skin turgor and  elasticity  Neurologic: Cranial nerves appear intact with no evidence of facial asymmetry, normal motor and sensory functions in all 4 extremities  Psych: Alert and oriented x 3, normal mood    Results Reviewed:  LAB RESULTS:      Lab 12/03/21 0359 12/02/21 2308 12/02/21 0431 12/02/21 0357 12/01/21  1705 12/01/21  1434 12/01/21  0836 11/29/21  1047   WBC  --  5.40  --  4.78 4.19  --   --  4.29   HEMOGLOBIN  --  13.6  --  12.8 13.6  --   --  15.0   HEMATOCRIT  --  39.7  --  37.7 39.2  --   --  44.5   PLATELETS  --  146  --  127* 109*  --   --  127*   NEUTROS ABS  --  3.02  --  3.20 2.56  --   --  1.92   IMMATURE GRANS (ABS)  --  0.01  --  0.01 0.01  --   --  0.01   LYMPHS ABS  --  1.94  --  1.24 1.29  --   --  1.71   MONOS ABS  --  0.43  --  0.33 0.32  --   --  0.58   EOS ABS  --  0.00  --  0.00 0.00  --   --  0.05   MCV  --  87.8  --  89.1 87.9  --   --  89.4   SED RATE  --   --   --   --   --   --   --  24*   CRP 1.71*  --  3.45*  --   --   --  2.62* 1.12*   PROCALCITONIN 0.06  --   --   --   --   --  0.04 0.07   LACTATE  --   --   --   --   --   --  1.0 2.0   LDH  --   --   --   --   --   --   --  167   D DIMER QUANT  --   --   --   --   --  0.80*  --  0.75*         Lab 12/03/21 0359 12/02/21 2308 12/02/21 0431 12/01/21  0836 11/29/21  1047   SODIUM  --  142 139 135* 138   POTASSIUM  --  4.1 4.1 4.4 3.9   CHLORIDE  --  109* 105 102 102   CO2  --  22.0 21.0* 22.0 22.0   ANION GAP  --  11.0 13.0 11.0 14.0   BUN  --  13 10 9 10   CREATININE  --  0.71 0.70 0.79 0.76   GLUCOSE  --  115* 122* 104* 123*   CALCIUM  --  8.0* 8.1* 8.1* 9.1   MAGNESIUM  --  2.6 1.9 1.7 1.9   PHOSPHORUS 2.3*  --   --   --   --          Lab 12/02/21  2308 12/02/21  0431 12/01/21  0836 11/29/21  1047   TOTAL PROTEIN 6.4 6.5 6.2 7.3   ALBUMIN 3.60 3.80 3.70 4.10   GLOBULIN 2.8 2.7 2.5 3.2   ALT (SGPT) 19 22 26 35*   AST (SGOT) 19 22 29 31   BILIRUBIN 0.2 <0.2 0.2 <0.2   ALK PHOS 75 81 86 90         Lab 12/03/21  0512 12/03/21  0359  12/03/21  0146 12/02/21  2308 12/01/21  0836 11/29/21  1047 11/29/21  1047   PROBNP  --   --   --  162.3  --   --  5.7   TROPONIN T <0.010 <0.010  <0.010 <0.010 <0.010 <0.010   < > <0.010    < > = values in this interval not displayed.                 Brief Urine Lab Results  (Last result in the past 365 days)      Color   Clarity   Blood   Leuk Est   Nitrite   Protein   CREAT   Urine HCG        11/29/21 1132               Negative             Microbiology Results Abnormal     Procedure Component Value - Date/Time    Blood Culture - Blood, Hand, Left [[id number]]  (Normal) Collected: 12/01/21 1118    Lab Status: Preliminary result Specimen: Blood from Hand, Left Updated: 12/02/21 1145     Blood Culture No growth at 24 hours    Blood Culture - Blood, Arm, Left [[id number]]  (Normal) Collected: 12/01/21 1100    Lab Status: Preliminary result Specimen: Blood from Arm, Left Updated: 12/02/21 1145     Blood Culture No growth at 24 hours          Adult Transthoracic Echo Complete W/ Cont if Necessary Per Protocol    Result Date: 12/2/2021  · Left ventricular ejection fraction appears to be 61 - 65%. Left ventricular systolic function is normal. · Left ventricular diastolic function was normal. · Estimated right ventricular systolic pressure from tricuspid regurgitation is normal (<35 mmHg). Calculated right ventricular systolic pressure from tricuspid regurgitation is 28 mmHg.      XR Chest 1 View    Result Date: 12/2/2021  PROCEDURE: CR Chest 1 Vw COMPARISON:  12/1/2021 INDICATIONS: chest pressure; Nausea with vomiting, unspecified;COVID-19;Hypoxemia;Syncope and collapse;COVID-19;Pneumonia due to coronavirus disease 2019;Abnormal findings on diagnostic imaging of other specified body structures; Hypocalcemia TECHNIQUE: Single AP  view of the chest FINDINGS:  Cardiomediastinal silhouette is stable in lung volumes remain low. However increasing bilateral lower lobe infiltrates are present. Osseous structures are intact.      Impression: Bilateral lower lobe infiltrates.  Signer Name: Treva Parish MD  Signed: 12/2/2021 11:40 PM  Workstation Name: [facility]-  [facility]    XR Chest 1 View    Result Date: 12/1/2021  EXAMINATION: XR CHEST 1 VW-12/01/2021:  INDICATION: c19, hypoxia, recent pneumonia dx; R11.2-Nausea with vomiting, unspecified; U07.1-COVID-19; R09.02-Hypoxemia; R55-Syncope and collapse.  COMPARISON: 11/29/2021.  FINDINGS: Portable chest reveals mild increased markings seen in the lung bases bilaterally slightly worsening in the interval. Lung volumes are low. Cardiac and mediastinal silhouettes are within normal limits. The bony structures are unremarkable.      Impression: Mild increased markings identified in the lung bases bilaterally. Findings have slightly worsened in the interval.  D:  12/01/2021 E:  12/01/2021  This report was finalized on 12/1/2021 4:53 PM by Dr. Marguerite Alexander MD.      Duplex Venous Lower Extremity - Left CAR    Result Date: 12/2/2021  · Normal left lower extremity venous duplex scan.        Results for orders placed during the hospital encounter of 12/01/21    Adult Transthoracic Echo Complete W/ Cont if Necessary Per Protocol    Interpretation Summary  · Left ventricular ejection fraction appears to be 61 - 65%. Left ventricular systolic function is normal.  · Left ventricular diastolic function was normal.  · Estimated right ventricular systolic pressure from tricuspid regurgitation is normal (<35 mmHg). Calculated right ventricular systolic pressure from tricuspid regurgitation is 28 mmHg.      I have reviewed the medications:  Scheduled Meds:bisoprolol, 10 mg, Oral, Nightly  desvenlafaxine, 100 mg, Oral, Daily  dexamethasone, 6 mg, Oral, Daily   Or  dexamethasone, 6 mg, Intravenous, Daily  enoxaparin, 40 mg, Subcutaneous, Q24H  melatonin, 10 mg, Oral, Nightly  pantoprazole, 40 mg, Oral, Q AM  remdesivir, 100 mg, Intravenous, Q24H      Continuous  Infusions:lactated ringers, 100 mL/hr, Last Rate: 100 mL/hr (12/03/21 0302)  Pharmacy Consult - Remdesivir, , Last Rate: Stopped (12/02/21 1412)      PRN Meds:.•  acetaminophen **OR** acetaminophen  •  albuterol sulfate HFA  •  benzonatate  •  dextromethorphan polistirex ER  •  ketorolac  •  magnesium sulfate **OR** magnesium sulfate **OR** magnesium sulfate  •  ondansetron  •  Pharmacy Consult - Remdesivir  •  prochlorperazine **OR** prochlorperazine **OR** prochlorperazine  •  promethazine **OR** promethazine  •  sodium chloride    Assessment/Plan   Assessment & Plan     Active Hospital Problems    Diagnosis  POA   • **Syncope [R55]  Yes   • Non-intractable vomiting [R11.10]  Yes   • Pneumonia due to COVID-19 virus [U07.1, J12.82]  Yes   • Atrial fibrillation (HCC) [I48.91]  Yes      Resolved Hospital Problems   No resolved problems to display.        Brief Hospital Course to date:  Cristina Haines is a 36 y.o. female patient with past medical history of atrial fibrillation, status post remote ablationfFollowing with Dr. Gentile, and chronic recurrent vasovagal syncopal episodes presented to the hospital with shortness of breath and recurrent syncopal episodes related to excessive nausea and vomiting and was found to have COVID-19 pneumonia.  She disposed for COVID-19 on 11/27/2021 despite being vaccinated with [employer] vaccine 6 months ago.    Assessment and plan:  Severe COVID-19 infection with bilateral pneumonia  Acute hypoxia secondary to bilateral pneumonia  · Continue IV dexamethasone  · Continue IV remdesivir  · Continue oxygen supplementation to target oxygen saturation above 90%  · Duo nebs  · Incentive spirometry  · Antitussives  · Encourage prone positioning    Recurrent syncopal episodes  History of atrial fibrillation status post remote ablation  · They are chronic and vasovagal related to vomiting  · Was slightly hypovolemic in the ER which might aggravated her symptoms.  Continue IV  fluids for another 24 hours  · Continue to encourage p.o. intake  · Cardiology following.  Appreciate help and recommendations    Elevated D-dimer  Rule out left lower extremity DVT  · D-dimer is likely related to her COVID-19 pneumonia  · CTA chest with no evidence of PE  · Duplex study left lower extremity with no evidence of DVT    Obesity, BMI 38.8  · Complicates all aspect of management plan    Hypophosphatemia  · Replace    DVT prophylaxis:  Medical DVT prophylaxis orders are present.            Disposition: I expect the patient to be discharged to be determined    CODE STATUS:   Code Status and Medical Interventions:   Ordered at: 12/01/21 1401     Code Status (Patient has no pulse and is not breathing):    CPR (Attempt to Resuscitate)     Medical Interventions (Patient has pulse or is breathing):    Full Support       Jorge Maldonado MD  12/03/21

## 2021-12-03 NOTE — PLAN OF CARE
Goal Outcome Evaluation:  Rapid response called this shift see previous note for details. Pt currently has no complaints. Pt on 2L NC.    Pt vomited several times this shift. PRN nausea medication administered.    Sinus rhythm per telemetry.

## 2021-12-03 NOTE — PLAN OF CARE
Goal Outcome Evaluation:    VSS. Patient currently requiring 2L nasal canula. Patient treated x1 with zofran and x1 with phenergan. Patient verbalized relief post medication administration. LR currently infusing at 75ml/hr. Will continue to monitor.       Problem: Adult Inpatient Plan of Care  Goal: Plan of Care Review  Outcome: Ongoing, Progressing  Goal: Patient-Specific Goal (Individualized)  Outcome: Ongoing, Progressing  Goal: Absence of Hospital-Acquired Illness or Injury  Outcome: Ongoing, Progressing  Intervention: Identify and Manage Fall Risk  Recent Flowsheet Documentation  Taken 12/3/2021 1600 by Derek Clayton RN  Safety Promotion/Fall Prevention:   activity supervised   assistive device/personal items within reach   clutter free environment maintained   safety round/check completed   room organization consistent  Taken 12/3/2021 1400 by Derek Clayton RN  Safety Promotion/Fall Prevention:   activity supervised   assistive device/personal items within reach   clutter free environment maintained   safety round/check completed   room organization consistent  Taken 12/3/2021 1200 by Derek Clayton RN  Safety Promotion/Fall Prevention:   activity supervised   assistive device/personal items within reach   clutter free environment maintained   safety round/check completed   room organization consistent  Taken 12/3/2021 1000 by Derek Clayton RN  Safety Promotion/Fall Prevention:   activity supervised   assistive device/personal items within reach   clutter free environment maintained   safety round/check completed   room organization consistent  Taken 12/3/2021 0800 by Derek Clayton RN  Safety Promotion/Fall Prevention:   activity supervised   assistive device/personal items within reach   clutter free environment maintained   safety round/check completed   room organization consistent  Intervention: Prevent Skin Injury  Recent Flowsheet Documentation  Taken 12/3/2021 1600 by Derek Clayton RN  Body  Position: position changed independently  Skin Protection: adhesive use limited  Taken 12/3/2021 1400 by Derek Clayton RN  Body Position: position changed independently  Skin Protection: adhesive use limited  Taken 12/3/2021 1200 by Derek Clayton RN  Body Position: position changed independently  Skin Protection: adhesive use limited  Taken 12/3/2021 1000 by Derek Clayton RN  Body Position: position changed independently  Skin Protection: adhesive use limited  Taken 12/3/2021 0800 by Derek Clayton RN  Body Position: position changed independently  Skin Protection: adhesive use limited  Intervention: Prevent and Manage VTE (venous thromboembolism) Risk  Recent Flowsheet Documentation  Taken 12/3/2021 0800 by Derek Clayton RN  VTE Prevention/Management: bleeding risk factor(s) identified  Goal: Optimal Comfort and Wellbeing  Outcome: Ongoing, Progressing  Intervention: Provide Person-Centered Care  Recent Flowsheet Documentation  Taken 12/3/2021 0800 by Derek Clayton RN  Trust Relationship/Rapport: care explained  Goal: Readiness for Transition of Care  Outcome: Ongoing, Progressing     Problem: Fall Injury Risk  Goal: Absence of Fall and Fall-Related Injury  Outcome: Ongoing, Progressing  Intervention: Promote Injury-Free Environment  Recent Flowsheet Documentation  Taken 12/3/2021 1600 by Derek Clayton RN  Safety Promotion/Fall Prevention:   activity supervised   assistive device/personal items within reach   clutter free environment maintained   safety round/check completed   room organization consistent  Taken 12/3/2021 1400 by Derek Clayton RN  Safety Promotion/Fall Prevention:   activity supervised   assistive device/personal items within reach   clutter free environment maintained   safety round/check completed   room organization consistent  Taken 12/3/2021 1200 by Derek Clayton RN  Safety Promotion/Fall Prevention:   activity supervised   assistive device/personal items within reach    clutter free environment maintained   safety round/check completed   room organization consistent  Taken 12/3/2021 1000 by Derek Clayton, RN  Safety Promotion/Fall Prevention:   activity supervised   assistive device/personal items within reach   clutter free environment maintained   safety round/check completed   room organization consistent  Taken 12/3/2021 0800 by Derek Clayton, RN  Safety Promotion/Fall Prevention:   activity supervised   assistive device/personal items within reach   clutter free environment maintained   safety round/check completed   room organization consistent     Problem: Asthma Comorbidity  Goal: Maintenance of Asthma Control  Outcome: Ongoing, Progressing     Problem: Hypertension Comorbidity  Goal: Blood Pressure in Desired Range  Outcome: Ongoing, Progressing

## 2021-12-04 LAB
ALBUMIN SERPL-MCNC: 3.5 G/DL (ref 3.5–5.2)
ALBUMIN/GLOB SERPL: 1.2 G/DL
ALP SERPL-CCNC: 64 U/L (ref 39–117)
ALT SERPL W P-5'-P-CCNC: 17 U/L (ref 1–33)
ANION GAP SERPL CALCULATED.3IONS-SCNC: 11 MMOL/L (ref 5–15)
AST SERPL-CCNC: 16 U/L (ref 1–32)
BACTERIA SPEC AEROBE CULT: NORMAL
BACTERIA SPEC AEROBE CULT: NORMAL
BASOPHILS # BLD AUTO: 0.01 10*3/MM3 (ref 0–0.2)
BASOPHILS NFR BLD AUTO: 0.2 % (ref 0–1.5)
BILIRUB SERPL-MCNC: 0.2 MG/DL (ref 0–1.2)
BUN SERPL-MCNC: 11 MG/DL (ref 6–20)
BUN/CREAT SERPL: 22.9 (ref 7–25)
CALCIUM SPEC-SCNC: 8.4 MG/DL (ref 8.6–10.5)
CHLORIDE SERPL-SCNC: 107 MMOL/L (ref 98–107)
CO2 SERPL-SCNC: 22 MMOL/L (ref 22–29)
CREAT SERPL-MCNC: 0.48 MG/DL (ref 0.57–1)
CRP SERPL-MCNC: 0.74 MG/DL (ref 0–0.5)
DEPRECATED RDW RBC AUTO: 38.5 FL (ref 37–54)
EOSINOPHIL # BLD AUTO: 0.11 10*3/MM3 (ref 0–0.4)
EOSINOPHIL NFR BLD AUTO: 1.9 % (ref 0.3–6.2)
ERYTHROCYTE [DISTWIDTH] IN BLOOD BY AUTOMATED COUNT: 12 % (ref 12.3–15.4)
GFR SERPL CREATININE-BSD FRML MDRD: 146 ML/MIN/1.73
GLOBULIN UR ELPH-MCNC: 2.9 GM/DL
GLUCOSE SERPL-MCNC: 109 MG/DL (ref 65–99)
HCT VFR BLD AUTO: 37.4 % (ref 34–46.6)
HGB BLD-MCNC: 13 G/DL (ref 12–15.9)
IMM GRANULOCYTES # BLD AUTO: 0.02 10*3/MM3 (ref 0–0.05)
IMM GRANULOCYTES NFR BLD AUTO: 0.4 % (ref 0–0.5)
LYMPHOCYTES # BLD AUTO: 2.45 10*3/MM3 (ref 0.7–3.1)
LYMPHOCYTES NFR BLD AUTO: 43 % (ref 19.6–45.3)
MAGNESIUM SERPL-MCNC: 1.9 MG/DL (ref 1.6–2.6)
MCH RBC QN AUTO: 30.1 PG (ref 26.6–33)
MCHC RBC AUTO-ENTMCNC: 34.8 G/DL (ref 31.5–35.7)
MCV RBC AUTO: 86.6 FL (ref 79–97)
MONOCYTES # BLD AUTO: 0.35 10*3/MM3 (ref 0.1–0.9)
MONOCYTES NFR BLD AUTO: 6.1 % (ref 5–12)
NEUTROPHILS NFR BLD AUTO: 2.76 10*3/MM3 (ref 1.7–7)
NEUTROPHILS NFR BLD AUTO: 48.4 % (ref 42.7–76)
NRBC BLD AUTO-RTO: 0 /100 WBC (ref 0–0.2)
PHOSPHATE SERPL-MCNC: 2.7 MG/DL (ref 2.5–4.5)
PLATELET # BLD AUTO: 157 10*3/MM3 (ref 140–450)
PMV BLD AUTO: 9.5 FL (ref 6–12)
POTASSIUM SERPL-SCNC: 3.8 MMOL/L (ref 3.5–5.2)
PROCALCITONIN SERPL-MCNC: 0.05 NG/ML (ref 0–0.25)
PROT SERPL-MCNC: 6.4 G/DL (ref 6–8.5)
RBC # BLD AUTO: 4.32 10*6/MM3 (ref 3.77–5.28)
SODIUM SERPL-SCNC: 140 MMOL/L (ref 136–145)
WBC NRBC COR # BLD: 5.7 10*3/MM3 (ref 3.4–10.8)

## 2021-12-04 PROCEDURE — 99232 SBSQ HOSP IP/OBS MODERATE 35: CPT | Performed by: INTERNAL MEDICINE

## 2021-12-04 PROCEDURE — 63710000001 PROCHLORPERAZINE MALEATE PER 5 MG: Performed by: INTERNAL MEDICINE

## 2021-12-04 PROCEDURE — 63710000001 DEXAMETHASONE PER 0.25 MG: Performed by: INTERNAL MEDICINE

## 2021-12-04 PROCEDURE — 86140 C-REACTIVE PROTEIN: CPT | Performed by: INTERNAL MEDICINE

## 2021-12-04 PROCEDURE — 85025 COMPLETE CBC W/AUTO DIFF WBC: CPT | Performed by: INTERNAL MEDICINE

## 2021-12-04 PROCEDURE — 84145 PROCALCITONIN (PCT): CPT | Performed by: INTERNAL MEDICINE

## 2021-12-04 PROCEDURE — 25010000002 ENOXAPARIN PER 10 MG: Performed by: INTERNAL MEDICINE

## 2021-12-04 PROCEDURE — 63710000001 PROMETHAZINE PER 12.5 MG: Performed by: INTERNAL MEDICINE

## 2021-12-04 PROCEDURE — 83735 ASSAY OF MAGNESIUM: CPT | Performed by: INTERNAL MEDICINE

## 2021-12-04 PROCEDURE — 0 MAGNESIUM SULFATE 4 GM/100ML SOLUTION: Performed by: INTERNAL MEDICINE

## 2021-12-04 PROCEDURE — 84100 ASSAY OF PHOSPHORUS: CPT | Performed by: PHYSICIAN ASSISTANT

## 2021-12-04 PROCEDURE — 80053 COMPREHEN METABOLIC PANEL: CPT | Performed by: INTERNAL MEDICINE

## 2021-12-04 RX ORDER — CLONAZEPAM 0.5 MG/1
0.5 TABLET ORAL 2 TIMES DAILY
Status: DISCONTINUED | OUTPATIENT
Start: 2021-12-04 | End: 2021-12-05 | Stop reason: HOSPADM

## 2021-12-04 RX ORDER — AMOXICILLIN 250 MG
2 CAPSULE ORAL 2 TIMES DAILY
Status: DISCONTINUED | OUTPATIENT
Start: 2021-12-04 | End: 2021-12-05 | Stop reason: HOSPADM

## 2021-12-04 RX ORDER — BISOPROLOL FUMARATE 5 MG/1
5 TABLET, FILM COATED ORAL NIGHTLY
Status: DISCONTINUED | OUTPATIENT
Start: 2021-12-04 | End: 2021-12-05 | Stop reason: HOSPADM

## 2021-12-04 RX ORDER — QUETIAPINE FUMARATE 25 MG/1
25 TABLET, FILM COATED ORAL EVERY 12 HOURS SCHEDULED
Status: DISCONTINUED | OUTPATIENT
Start: 2021-12-04 | End: 2021-12-05 | Stop reason: HOSPADM

## 2021-12-04 RX ORDER — POLYETHYLENE GLYCOL 3350 17 G/17G
17 POWDER, FOR SOLUTION ORAL DAILY PRN
Status: DISCONTINUED | OUTPATIENT
Start: 2021-12-04 | End: 2021-12-05 | Stop reason: HOSPADM

## 2021-12-04 RX ORDER — SODIUM CHLORIDE, SODIUM LACTATE, POTASSIUM CHLORIDE, CALCIUM CHLORIDE 600; 310; 30; 20 MG/100ML; MG/100ML; MG/100ML; MG/100ML
75 INJECTION, SOLUTION INTRAVENOUS CONTINUOUS
Status: DISCONTINUED | OUTPATIENT
Start: 2021-12-04 | End: 2021-12-05 | Stop reason: HOSPADM

## 2021-12-04 RX ADMIN — QUETIAPINE FUMARATE 25 MG: 25 TABLET ORAL at 12:20

## 2021-12-04 RX ADMIN — SODIUM CHLORIDE, PRESERVATIVE FREE 10 ML: 5 INJECTION INTRAVENOUS at 20:01

## 2021-12-04 RX ADMIN — QUETIAPINE FUMARATE 25 MG: 25 TABLET ORAL at 20:00

## 2021-12-04 RX ADMIN — ENOXAPARIN SODIUM 40 MG: 40 INJECTION SUBCUTANEOUS at 15:11

## 2021-12-04 RX ADMIN — DESVENLAFAXINE SUCCINATE 100 MG: 50 TABLET, EXTENDED RELEASE ORAL at 09:00

## 2021-12-04 RX ADMIN — SENNOSIDES AND DOCUSATE SODIUM 2 TABLET: 50; 8.6 TABLET ORAL at 20:00

## 2021-12-04 RX ADMIN — MAGNESIUM SULFATE HEPTAHYDRATE 4 G: 40 INJECTION, SOLUTION INTRAVENOUS at 05:54

## 2021-12-04 RX ADMIN — BISOPROLOL FUMARATE 5 MG: 5 TABLET, FILM COATED ORAL at 20:00

## 2021-12-04 RX ADMIN — CLONAZEPAM 0.5 MG: 0.5 TABLET ORAL at 10:03

## 2021-12-04 RX ADMIN — ACETAMINOPHEN 650 MG: 325 TABLET, FILM COATED ORAL at 09:00

## 2021-12-04 RX ADMIN — REMDESIVIR 100 MG: 100 INJECTION, POWDER, LYOPHILIZED, FOR SOLUTION INTRAVENOUS at 12:20

## 2021-12-04 RX ADMIN — SODIUM CHLORIDE, POTASSIUM CHLORIDE, SODIUM LACTATE AND CALCIUM CHLORIDE 75 ML/HR: 600; 310; 30; 20 INJECTION, SOLUTION INTRAVENOUS at 22:30

## 2021-12-04 RX ADMIN — CLONAZEPAM 0.5 MG: 0.5 TABLET ORAL at 20:00

## 2021-12-04 RX ADMIN — SODIUM CHLORIDE, POTASSIUM CHLORIDE, SODIUM LACTATE AND CALCIUM CHLORIDE 75 ML/HR: 600; 310; 30; 20 INJECTION, SOLUTION INTRAVENOUS at 04:51

## 2021-12-04 RX ADMIN — PROMETHAZINE HYDROCHLORIDE 12.5 MG: 12.5 TABLET ORAL at 04:48

## 2021-12-04 RX ADMIN — DEXAMETHASONE 6 MG: 2 TABLET ORAL at 09:00

## 2021-12-04 RX ADMIN — PANTOPRAZOLE SODIUM 40 MG: 40 TABLET, DELAYED RELEASE ORAL at 04:48

## 2021-12-04 RX ADMIN — PROCHLORPERAZINE MALEATE 5 MG: 5 TABLET ORAL at 01:34

## 2021-12-04 NOTE — PLAN OF CARE
Goal Outcome Evaluation:  Plan of Care Reviewed With: patient        Progress: improving  Outcome Summary: Pt VSS, 02 room air. 2L when resting after given Clonopine and Seroquel. No c/o pain. Dr. Maldonado has approved for patient to get up to bedside commode at this time. No episodes of dizziness. Remdesivir given. Mag replaced this AM- recheck in AM. Possible d/c tomorrow.

## 2021-12-04 NOTE — PROGRESS NOTES
[facility] Medicine Services  PROGRESS NOTE    Patient Name: Cristina Haines  : 1985  MRN: [id number]    Date of Admission: 2021  Primary Care Physician: Rahat Mercedes MD    Subjective   Subjective     CC:  Syncope and shortness of breath    HPI:  I have seen and evaluated the patient this morning.  Feeling much better today.  Oxygen saturation is 92 to 93% on room air.  Able to get out of the bed with no dizziness.  No chest pain.  No other acute complaints.    ROS:  10 point review of systems is negative except for what is mentioned in HPI    Objective   Objective     Vital Signs:   Temp:  [97.8 °F (36.6 °C)-98.2 °F (36.8 °C)] 98 °F (36.7 °C)  Heart Rate:  [] 73  Resp:  [16-18] 16  BP: (104-111)/(68-87) 111/86  Flow (L/min):  [1-2] 1     Physical Exam:  General: Comfortable, not in any acute distress, appears stated age, conversant and cooperative  Head: Atraumatic and normocephalic, without obvious abnormality  Eyes:   Conjunctivae and sclerae normal, no Icterus. No pallor  Ears:  Ears appear intact with no abnormalities noted  Throat: No oral lesions, no thrush, oral mucosa moist  Neck: Supple, trachea midline, no thyromegaly  Back:   No kyphoscoliosis present. No tenderness to palpation,   no sacral edema  Lungs: Clear to auscultation bilaterally, equal air entry, no wheezing or crackles  Heart:  Normal S1 and S2, no murmur, no gallop, No JVD, no lower extremity swelling  Abdomen:  Soft, no tenderness, no organomegaly, normal bowel sounds  Normal bowel sounds, no masses, no organomegaly. Soft, nontender, nondistended, no guarding, no rebound tenderness.  Extremities: No gross abnormalities, no clubbing, pulses palpable and equal bilaterally  Skin: No bleeding, bruising or rash, normal skin turgor and elasticity  Neurologic: Cranial nerves appear intact with no evidence of facial asymmetry, normal motor and sensory functions in all 4 extremities  Psych:  Alert and oriented x 3, normal mood    Results Reviewed:  LAB RESULTS:      Lab 12/04/21 0309 12/03/21 0359 12/02/21 2308 12/02/21 0431 12/02/21 0357 12/01/21  1705 12/01/21  1434 12/01/21  0836 11/29/21  1047   WBC 5.70  --  5.40  --  4.78 4.19  --   --  4.29   HEMOGLOBIN 13.0  --  13.6  --  12.8 13.6  --   --  15.0   HEMATOCRIT 37.4  --  39.7  --  37.7 39.2  --   --  44.5   PLATELETS 157  --  146  --  127* 109*  --   --  127*   NEUTROS ABS 2.76  --  3.02  --  3.20 2.56  --   --  1.92   IMMATURE GRANS (ABS) 0.02  --  0.01  --  0.01 0.01  --   --  0.01   LYMPHS ABS 2.45  --  1.94  --  1.24 1.29  --   --  1.71   MONOS ABS 0.35  --  0.43  --  0.33 0.32  --   --  0.58   EOS ABS 0.11  --  0.00  --  0.00 0.00  --   --  0.05   MCV 86.6  --  87.8  --  89.1 87.9  --   --  89.4   SED RATE  --   --   --   --   --   --   --   --  24*   CRP 0.74* 1.71*  --  3.45*  --   --   --  2.62* 1.12*   PROCALCITONIN 0.05 0.06  --   --   --   --   --  0.04 0.07   LACTATE  --   --   --   --   --   --   --  1.0 2.0   LDH  --   --   --   --   --   --   --   --  167   D DIMER QUANT  --   --   --   --   --   --  0.80*  --  0.75*         Lab 12/04/21 0309 12/03/21 0359 12/02/21 2308 12/02/21  0431 12/01/21  0836 11/29/21  1047   SODIUM 140  --  142 139 135* 138   POTASSIUM 3.8  --  4.1 4.1 4.4 3.9   CHLORIDE 107  --  109* 105 102 102   CO2 22.0  --  22.0 21.0* 22.0 22.0   ANION GAP 11.0  --  11.0 13.0 11.0 14.0   BUN 11  --  13 10 9 10   CREATININE 0.48*  --  0.71 0.70 0.79 0.76   GLUCOSE 109*  --  115* 122* 104* 123*   CALCIUM 8.4*  --  8.0* 8.1* 8.1* 9.1   MAGNESIUM 1.9  --  2.6 1.9 1.7 1.9   PHOSPHORUS 2.7 2.3*  --   --   --   --          Lab 12/04/21  0309 12/02/21  2308 12/02/21  0431 12/01/21  0836 11/29/21  1047   TOTAL PROTEIN 6.4 6.4 6.5 6.2 7.3   ALBUMIN 3.50 3.60 3.80 3.70 4.10   GLOBULIN 2.9 2.8 2.7 2.5 3.2   ALT (SGPT) 17 19 22 26 35*   AST (SGOT) 16 19 22 29 31   BILIRUBIN 0.2 0.2 <0.2 0.2 <0.2   ALK PHOS 64 75 81 86 90          Lab 12/03/21  0512 12/03/21  0359 12/03/21  0146 12/02/21  2308 12/01/21  0836 11/29/21  1047 11/29/21  1047   PROBNP  --   --   --  162.3  --   --  5.7   TROPONIN T <0.010 <0.010  <0.010 <0.010 <0.010 <0.010   < > <0.010    < > = values in this interval not displayed.                 Brief Urine Lab Results  (Last result in the past 365 days)      Color   Clarity   Blood   Leuk Est   Nitrite   Protein   CREAT   Urine HCG        11/29/21 1132               Negative             Microbiology Results Abnormal     Procedure Component Value - Date/Time    Blood Culture - Blood, Hand, Left [[id number]]  (Normal) Collected: 12/01/21 1118    Lab Status: Preliminary result Specimen: Blood from Hand, Left Updated: 12/03/21 1145     Blood Culture No growth at 2 days    Blood Culture - Blood, Arm, Left [[id number]]  (Normal) Collected: 12/01/21 1100    Lab Status: Preliminary result Specimen: Blood from Arm, Left Updated: 12/03/21 1145     Blood Culture No growth at 2 days          Adult Transthoracic Echo Complete W/ Cont if Necessary Per Protocol    Result Date: 12/2/2021  · Left ventricular ejection fraction appears to be 61 - 65%. Left ventricular systolic function is normal. · Left ventricular diastolic function was normal. · Estimated right ventricular systolic pressure from tricuspid regurgitation is normal (<35 mmHg). Calculated right ventricular systolic pressure from tricuspid regurgitation is 28 mmHg.      XR Chest 1 View    Result Date: 12/2/2021  PROCEDURE: CR Chest 1 Vw COMPARISON:  12/1/2021 INDICATIONS: chest pressure; Nausea with vomiting, unspecified;COVID-19;Hypoxemia;Syncope and collapse;COVID-19;Pneumonia due to coronavirus disease 2019;Abnormal findings on diagnostic imaging of other specified body structures; Hypocalcemia TECHNIQUE: Single AP  view of the chest FINDINGS:  Cardiomediastinal silhouette is stable in lung volumes remain low. However increasing bilateral lower lobe infiltrates are  present. Osseous structures are intact.     Impression: Bilateral lower lobe infiltrates.  Signer Name: Treva Parish MD  Signed: 12/2/2021 11:40 PM  Workstation Name: [facility]-  [facility]    Duplex Venous Lower Extremity - Left CAR    Result Date: 12/2/2021  · Normal left lower extremity venous duplex scan.        Results for orders placed during the hospital encounter of 12/01/21    Adult Transthoracic Echo Complete W/ Cont if Necessary Per Protocol    Interpretation Summary  · Left ventricular ejection fraction appears to be 61 - 65%. Left ventricular systolic function is normal.  · Left ventricular diastolic function was normal.  · Estimated right ventricular systolic pressure from tricuspid regurgitation is normal (<35 mmHg). Calculated right ventricular systolic pressure from tricuspid regurgitation is 28 mmHg.      I have reviewed the medications:  Scheduled Meds:bisoprolol, 10 mg, Oral, Nightly  desvenlafaxine, 100 mg, Oral, Daily  dexamethasone, 6 mg, Oral, Daily   Or  dexamethasone, 6 mg, Intravenous, Daily  enoxaparin, 40 mg, Subcutaneous, Q24H  melatonin, 10 mg, Oral, Nightly  pantoprazole, 40 mg, Oral, Q AM  remdesivir, 100 mg, Intravenous, Q24H      Continuous Infusions:lactated ringers, 75 mL/hr, Last Rate: 75 mL/hr (12/04/21 0451)  Pharmacy Consult - Remdesivir, , Last Rate: Stopped (12/02/21 1412)      PRN Meds:.•  acetaminophen **OR** acetaminophen  •  albuterol sulfate HFA  •  benzonatate  •  dextromethorphan polistirex ER  •  ketorolac  •  magnesium sulfate **OR** magnesium sulfate **OR** magnesium sulfate  •  ondansetron  •  Pharmacy Consult - Remdesivir  •  prochlorperazine **OR** prochlorperazine **OR** prochlorperazine  •  promethazine **OR** promethazine  •  sodium chloride    Assessment/Plan   Assessment & Plan     Active Hospital Problems    Diagnosis  POA   • **Syncope [R55]  Yes   • Non-intractable vomiting [R11.10]  Yes   • Pneumonia due to COVID-19 virus  [U07.1, J12.82]  Yes   • Atrial fibrillation (HCC) [I48.91]  Yes      Resolved Hospital Problems   No resolved problems to display.        Brief Hospital Course to date:  Cristina Haines is a 36 y.o. female patient with past medical history of atrial fibrillation, status post remote ablationfFollowing with Dr. Gentile, and chronic recurrent vasovagal syncopal episodes presented to the hospital with shortness of breath and recurrent syncopal episodes related to excessive nausea and vomiting and was found to have COVID-19 pneumonia.  She disposed for COVID-19 on 11/27/2021 despite being vaccinated with [employer] vaccine 6 months ago.    Assessment and plan:  Severe COVID-19 infection with bilateral pneumonia  Acute hypoxia secondary to bilateral pneumonia  · Symptoms are slowly improving.  Currently off oxygen and on room air.  Oxygen saturation is 92 to 93%.  Will desat and become short of breath on ambulation.  We need 6-minute walk test tomorrow prior to discharge.  · Continue IV dexamethasone  · Continue IV remdesivir  · Continue oxygen supplementation to target oxygen saturation above 90%  · Duo nebs  · Incentive spirometry  · Antitussives  · Encourage prone positioning    Recurrent syncopal episodes  History of atrial fibrillation status post remote ablation  · They are chronic and vasovagal related to vomiting  · Symptoms resolved with hydration   · Continue to encourage p.o. intake  · Cardiology following.  Appreciate help and recommendations    Elevated D-dimer  Rule out left lower extremity DVT  · D-dimer is likely related to her COVID-19 pneumonia  · CTA chest with no evidence of PE  · Duplex study left lower extremity with no evidence of DVT    Obesity, BMI 38.8  · Complicates all aspect of management plan    Hypophosphatemia  · Replace    DVT prophylaxis:  Medical DVT prophylaxis orders are present.       AM-PAC 6 Clicks Score (PT): 20 (12/03/21 1100)    Disposition: I expect the patient to  be discharged to be determined    CODE STATUS:   Code Status and Medical Interventions:   Ordered at: 12/01/21 1401     Code Status (Patient has no pulse and is not breathing):    CPR (Attempt to Resuscitate)     Medical Interventions (Patient has pulse or is breathing):    Full Support       Jorge Maldonado MD  12/04/21

## 2021-12-04 NOTE — PLAN OF CARE
VSS on 2L.  NSR per tele.  Pt continues to complain of nausea but states that she has not vomited since Thursday- PRN antiemetics given.  C/o itching to the hand with her IV- changed IV dressing, no redness or irritation.      Problem: Adult Inpatient Plan of Care  Goal: Plan of Care Review  Outcome: Ongoing, Progressing  Goal: Patient-Specific Goal (Individualized)  Outcome: Ongoing, Progressing  Goal: Absence of Hospital-Acquired Illness or Injury  Outcome: Ongoing, Progressing  Intervention: Identify and Manage Fall Risk  Recent Flowsheet Documentation  Taken 12/4/2021 0200 by Starr Monreal, RN  Safety Promotion/Fall Prevention:   activity supervised   assistive device/personal items within reach   clutter free environment maintained   fall prevention program maintained   nonskid shoes/slippers when out of bed   room organization consistent   safety round/check completed  Taken 12/4/2021 0000 by Starr Monreal, RN  Safety Promotion/Fall Prevention:   activity supervised   assistive device/personal items within reach   clutter free environment maintained   nonskid shoes/slippers when out of bed   safety round/check completed   room organization consistent   fall prevention program maintained  Taken 12/3/2021 2200 by Starr Monreal, RN  Safety Promotion/Fall Prevention:   activity supervised   assistive device/personal items within reach   clutter free environment maintained   fall prevention program maintained   nonskid shoes/slippers when out of bed   room organization consistent   safety round/check completed  Taken 12/3/2021 2000 by Starr Monreal, RN  Safety Promotion/Fall Prevention:   activity supervised   assistive device/personal items within reach   clutter free environment maintained   fall prevention program maintained   nonskid shoes/slippers when out of bed   room organization consistent   safety round/check completed  Intervention: Prevent Skin Injury  Recent Flowsheet Documentation  Taken  12/4/2021 0200 by Starr Monreal RN  Body Position: position changed independently  Taken 12/4/2021 0000 by Starr Monreal RN  Body Position: position changed independently  Taken 12/3/2021 2200 by Starr Monreal RN  Body Position: position changed independently  Taken 12/3/2021 2000 by Starr Monreal RN  Body Position: position changed independently  Skin Protection: adhesive use limited  Intervention: Prevent and Manage VTE (venous thromboembolism) Risk  Recent Flowsheet Documentation  Taken 12/3/2021 2000 by Starr Monreal RN  VTE Prevention/Management:   bilateral   dorsiflexion/plantar flexion performed   bleeding risk factor(s) identified  Intervention: Prevent Infection  Recent Flowsheet Documentation  Taken 12/4/2021 0200 by Starr Monreal RN  Infection Prevention:   environmental surveillance performed   visitors restricted/screened   single patient room provided   rest/sleep promoted   personal protective equipment utilized  Taken 12/4/2021 0000 by Starr Monreal RN  Infection Prevention:   environmental surveillance performed   visitors restricted/screened   single patient room provided   rest/sleep promoted   personal protective equipment utilized  Taken 12/3/2021 2200 by Starr Monreal RN  Infection Prevention:   environmental surveillance performed   visitors restricted/screened   single patient room provided   rest/sleep promoted   personal protective equipment utilized  Taken 12/3/2021 2000 by Starr Monreal RN  Infection Prevention:   environmental surveillance performed   visitors restricted/screened   single patient room provided   rest/sleep promoted   personal protective equipment utilized  Goal: Optimal Comfort and Wellbeing  Outcome: Ongoing, Progressing  Intervention: Provide Person-Centered Care  Recent Flowsheet Documentation  Taken 12/3/2021 2000 by Starr Monreal RN  Trust Relationship/Rapport:   care explained   choices provided   questions answered  Goal: Readiness  for Transition of Care  Outcome: Ongoing, Progressing     Problem: Fall Injury Risk  Goal: Absence of Fall and Fall-Related Injury  Outcome: Ongoing, Progressing  Intervention: Identify and Manage Contributors to Fall Injury Risk  Recent Flowsheet Documentation  Taken 12/4/2021 0200 by Starr Monreal RN  Medication Review/Management: medications reviewed  Taken 12/4/2021 0000 by Starr Monreal RN  Medication Review/Management: medications reviewed  Taken 12/3/2021 2200 by Starr Monreal RN  Medication Review/Management: medications reviewed  Taken 12/3/2021 2000 by Starr Monreal RN  Medication Review/Management: medications reviewed  Intervention: Promote Injury-Free Environment  Recent Flowsheet Documentation  Taken 12/4/2021 0200 by Starr Monreal RN  Safety Promotion/Fall Prevention:   activity supervised   assistive device/personal items within reach   clutter free environment maintained   fall prevention program maintained   nonskid shoes/slippers when out of bed   room organization consistent   safety round/check completed  Taken 12/4/2021 0000 by Starr Monreal RN  Safety Promotion/Fall Prevention:   activity supervised   assistive device/personal items within reach   clutter free environment maintained   nonskid shoes/slippers when out of bed   safety round/check completed   room organization consistent   fall prevention program maintained  Taken 12/3/2021 2200 by Starr Monreal RN  Safety Promotion/Fall Prevention:   activity supervised   assistive device/personal items within reach   clutter free environment maintained   fall prevention program maintained   nonskid shoes/slippers when out of bed   room organization consistent   safety round/check completed  Taken 12/3/2021 2000 by Starr Monreal RN  Safety Promotion/Fall Prevention:   activity supervised   assistive device/personal items within reach   clutter free environment maintained   fall prevention program maintained   nonskid  shoes/slippers when out of bed   room organization consistent   safety round/check completed     Problem: Asthma Comorbidity  Goal: Maintenance of Asthma Control  Outcome: Ongoing, Progressing  Intervention: Maintain Asthma Symptom Control  Recent Flowsheet Documentation  Taken 12/4/2021 0200 by Starr Monreal RN  Medication Review/Management: medications reviewed  Taken 12/4/2021 0000 by Starr Monreal RN  Medication Review/Management: medications reviewed  Taken 12/3/2021 2200 by Starr Monreal RN  Medication Review/Management: medications reviewed  Taken 12/3/2021 2000 by Starr Monreal RN  Medication Review/Management: medications reviewed     Problem: Hypertension Comorbidity  Goal: Blood Pressure in Desired Range  Outcome: Ongoing, Progressing  Intervention: Maintain Hypertension-Management Strategies  Recent Flowsheet Documentation  Taken 12/4/2021 0200 by Starr Monreal RN  Medication Review/Management: medications reviewed  Taken 12/4/2021 0000 by Starr Monreal, RN  Medication Review/Management: medications reviewed  Taken 12/3/2021 2200 by Starr Monreal RN  Medication Review/Management: medications reviewed  Taken 12/3/2021 2000 by Starr Monreal RN  Medication Review/Management: medications reviewed

## 2021-12-05 ENCOUNTER — READMISSION MANAGEMENT (OUTPATIENT)
Dept: CALL CENTER | Facility: HOSPITAL | Age: 36
End: 2021-12-05

## 2021-12-05 VITALS
SYSTOLIC BLOOD PRESSURE: 100 MMHG | WEIGHT: 205.91 LBS | DIASTOLIC BLOOD PRESSURE: 80 MMHG | BODY MASS INDEX: 38.88 KG/M2 | RESPIRATION RATE: 14 BRPM | HEART RATE: 124 BPM | OXYGEN SATURATION: 98 % | HEIGHT: 61 IN | TEMPERATURE: 98 F

## 2021-12-05 LAB
ALBUMIN SERPL-MCNC: 3.7 G/DL (ref 3.5–5.2)
ALBUMIN/GLOB SERPL: 1.4 G/DL
ALP SERPL-CCNC: 67 U/L (ref 39–117)
ALT SERPL W P-5'-P-CCNC: 21 U/L (ref 1–33)
ANION GAP SERPL CALCULATED.3IONS-SCNC: 11 MMOL/L (ref 5–15)
AST SERPL-CCNC: 17 U/L (ref 1–32)
BASOPHILS # BLD AUTO: 0.01 10*3/MM3 (ref 0–0.2)
BASOPHILS NFR BLD AUTO: 0.2 % (ref 0–1.5)
BILIRUB SERPL-MCNC: 0.2 MG/DL (ref 0–1.2)
BUN SERPL-MCNC: 13 MG/DL (ref 6–20)
BUN/CREAT SERPL: 25 (ref 7–25)
CALCIUM SPEC-SCNC: 8.3 MG/DL (ref 8.6–10.5)
CHLORIDE SERPL-SCNC: 105 MMOL/L (ref 98–107)
CO2 SERPL-SCNC: 22 MMOL/L (ref 22–29)
CREAT SERPL-MCNC: 0.52 MG/DL (ref 0.57–1)
CRP SERPL-MCNC: 0.4 MG/DL (ref 0–0.5)
DEPRECATED RDW RBC AUTO: 38.6 FL (ref 37–54)
EOSINOPHIL # BLD AUTO: 0.01 10*3/MM3 (ref 0–0.4)
EOSINOPHIL NFR BLD AUTO: 0.2 % (ref 0.3–6.2)
ERYTHROCYTE [DISTWIDTH] IN BLOOD BY AUTOMATED COUNT: 11.9 % (ref 12.3–15.4)
GFR SERPL CREATININE-BSD FRML MDRD: 133 ML/MIN/1.73
GLOBULIN UR ELPH-MCNC: 2.7 GM/DL
GLUCOSE SERPL-MCNC: 117 MG/DL (ref 65–99)
HCT VFR BLD AUTO: 40.4 % (ref 34–46.6)
HGB BLD-MCNC: 13.6 G/DL (ref 12–15.9)
IMM GRANULOCYTES # BLD AUTO: 0.05 10*3/MM3 (ref 0–0.05)
IMM GRANULOCYTES NFR BLD AUTO: 0.9 % (ref 0–0.5)
LYMPHOCYTES # BLD AUTO: 1.96 10*3/MM3 (ref 0.7–3.1)
LYMPHOCYTES NFR BLD AUTO: 33.6 % (ref 19.6–45.3)
MAGNESIUM SERPL-MCNC: 2.2 MG/DL (ref 1.6–2.6)
MCH RBC QN AUTO: 30 PG (ref 26.6–33)
MCHC RBC AUTO-ENTMCNC: 33.7 G/DL (ref 31.5–35.7)
MCV RBC AUTO: 89.2 FL (ref 79–97)
MONOCYTES # BLD AUTO: 0.38 10*3/MM3 (ref 0.1–0.9)
MONOCYTES NFR BLD AUTO: 6.5 % (ref 5–12)
NEUTROPHILS NFR BLD AUTO: 3.43 10*3/MM3 (ref 1.7–7)
NEUTROPHILS NFR BLD AUTO: 58.6 % (ref 42.7–76)
NRBC BLD AUTO-RTO: 0 /100 WBC (ref 0–0.2)
PLATELET # BLD AUTO: 208 10*3/MM3 (ref 140–450)
PMV BLD AUTO: 9.8 FL (ref 6–12)
POTASSIUM SERPL-SCNC: 4.3 MMOL/L (ref 3.5–5.2)
PROT SERPL-MCNC: 6.4 G/DL (ref 6–8.5)
RBC # BLD AUTO: 4.53 10*6/MM3 (ref 3.77–5.28)
SODIUM SERPL-SCNC: 138 MMOL/L (ref 136–145)
WBC NRBC COR # BLD: 5.84 10*3/MM3 (ref 3.4–10.8)

## 2021-12-05 PROCEDURE — 80053 COMPREHEN METABOLIC PANEL: CPT | Performed by: INTERNAL MEDICINE

## 2021-12-05 PROCEDURE — 86140 C-REACTIVE PROTEIN: CPT | Performed by: INTERNAL MEDICINE

## 2021-12-05 PROCEDURE — 85025 COMPLETE CBC W/AUTO DIFF WBC: CPT | Performed by: INTERNAL MEDICINE

## 2021-12-05 PROCEDURE — 83735 ASSAY OF MAGNESIUM: CPT | Performed by: INTERNAL MEDICINE

## 2021-12-05 PROCEDURE — 25010000002 DEXAMETHASONE PER 1 MG: Performed by: INTERNAL MEDICINE

## 2021-12-05 PROCEDURE — 99239 HOSP IP/OBS DSCHRG MGMT >30: CPT | Performed by: NURSE PRACTITIONER

## 2021-12-05 RX ORDER — DEXAMETHASONE 6 MG/1
6 TABLET ORAL DAILY
Qty: 5 TABLET | Refills: 0 | Status: SHIPPED | OUTPATIENT
Start: 2021-12-06 | End: 2021-12-11

## 2021-12-05 RX ORDER — BISOPROLOL FUMARATE 5 MG/1
5 TABLET, FILM COATED ORAL NIGHTLY
Qty: 30 TABLET | Refills: 0 | Status: SHIPPED | OUTPATIENT
Start: 2021-12-05 | End: 2021-12-22 | Stop reason: DRUGHIGH

## 2021-12-05 RX ADMIN — CLONAZEPAM 0.5 MG: 0.5 TABLET ORAL at 10:14

## 2021-12-05 RX ADMIN — DEXAMETHASONE SODIUM PHOSPHATE 6 MG: 4 INJECTION, SOLUTION INTRA-ARTICULAR; INTRALESIONAL; INTRAMUSCULAR; INTRAVENOUS; SOFT TISSUE at 10:15

## 2021-12-05 RX ADMIN — DESVENLAFAXINE SUCCINATE 100 MG: 50 TABLET, EXTENDED RELEASE ORAL at 10:15

## 2021-12-05 RX ADMIN — QUETIAPINE FUMARATE 25 MG: 25 TABLET ORAL at 11:00

## 2021-12-05 RX ADMIN — SODIUM CHLORIDE, PRESERVATIVE FREE 10 ML: 5 INJECTION INTRAVENOUS at 10:15

## 2021-12-05 RX ADMIN — REMDESIVIR 100 MG: 100 INJECTION, POWDER, LYOPHILIZED, FOR SOLUTION INTRAVENOUS at 11:00

## 2021-12-05 RX ADMIN — ACETAMINOPHEN 650 MG: 325 TABLET, FILM COATED ORAL at 11:00

## 2021-12-05 RX ADMIN — PANTOPRAZOLE SODIUM 40 MG: 40 TABLET, DELAYED RELEASE ORAL at 05:37

## 2021-12-05 RX ADMIN — SENNOSIDES AND DOCUSATE SODIUM 2 TABLET: 50; 8.6 TABLET ORAL at 10:14

## 2021-12-05 NOTE — OUTREACH NOTE
3/16/2021: Appeal letter faxed to prior authorization/[facility]    Prep Survey      Responses   [facility] patient discharged from? [place]   Is LACE score < 7 ? No   Emergency Room discharge w/ pulse ox? No   Eligibility [facility]   Date of Admission 12/01/21   Date of Discharge 12/05/21   Discharge Disposition Home or Self Care   Discharge diagnosis pna d/t covid   Does the patient have one of the following disease processes/diagnoses(primary or secondary)? COVID-19   Does the patient have Home health ordered? No   Is there a DME ordered? No   Prep survey completed? Yes          Agustina Roberto RN

## 2021-12-05 NOTE — DISCHARGE SUMMARY
[facility] Medicine Services  DISCHARGE SUMMARY    Patient Name: Cristina Haines  : 1985  MRN: [id number]    Date of Admission: 2021  8:13 AM  Date of Discharge:  21  Primary Care Physician: Rahat Mercedes MD    Consults     Date and Time Order Name Status Description    2021 12:35 AM Inpatient Cardiology Consult Completed           Hospital Course     Presenting Problem:   Non-intractable vomiting with nausea, unspecified vomiting type [R11.2]    Active Hospital Problems    Diagnosis  POA   • **Syncope [R55]  Yes   • Non-intractable vomiting [R11.10]  Yes   • Pneumonia due to COVID-19 virus [U07.1, J12.82]  Yes   • Atrial fibrillation (HCC) [I48.91]  Yes      Resolved Hospital Problems   No resolved problems to display.          Hospital Course:  Cristina Haines is a 36 y.o. female with past medical history of atrial fibrillation, status post remote ablation following with Dr. Gentile, and chronic recurrent vasovagal syncopal episodes presented to the hospital with shortness of breath and recurrent syncopal episodes related to excessive nausea and vomiting and was found to have COVID-19 pneumonia on 2021 despite being vaccinated with [employer] vaccine 6 months ago. Treated with IV Remdesivir x 5 doses, and decadron for total of 10 days. Had low blood pressures on admission likely due to dehydration. Cardiology was consulted, ECHO was normal, beta blocker was restarted at half dose as blood pressure tolerated. Will continue on lower dose at discharge and have patient follow up with PCP.  Has been weaned down to room air and is medically ready for discharge.      Discharge Follow Up Recommendations for outpatient labs/diagnostics:   PCP 1 week     Day of Discharge     HPI:   Feeling much better today. NAD. Breathing is almost at baseline, she has no SOA at rest, but continues to have HEART when ambulation. Non productive cough.  Eager to  go home today. Denies f/c, n/v/d, palpitations or cp.     Review of Systems  All other systems negative     Vital Signs:   Temp:  [97.9 °F (36.6 °C)-98.2 °F (36.8 °C)] 98 °F (36.7 °C)  Heart Rate:  [52-87] 52  Resp:  [14-17] 14  BP: ()/(68-84) 100/80     Physical Exam:  Constitutional: No acute distress, awake, alert  HENT: NCAT, mucous membranes moist  Respiratory: Clear to auscultation bilaterally, respiratory effort normal   Cardiovascular: RRR, no murmurs, rubs, or gallops  Gastrointestinal: Positive bowel sounds, soft, nontender, nondistended  Musculoskeletal: No bilateral ankle edema  Psychiatric: Appropriate affect, cooperative  Neurologic: Oriented x 3, strength symmetric in all extremities, Cranial Nerves grossly intact to confrontation, speech clear  Skin: No rashes     Pertinent  and/or Most Recent Results     LAB RESULTS:      Lab 12/05/21  0345 12/04/21  0309 12/03/21  0359 12/02/21  2308 12/02/21  0431 12/02/21  0357 12/01/21  1705 12/01/21  1434 12/01/21  0836 11/29/21  1047 11/29/21  1047   WBC 5.84 5.70  --  5.40  --  4.78 4.19  --   --    < > 4.29   HEMOGLOBIN 13.6 13.0  --  13.6  --  12.8 13.6  --   --    < > 15.0   HEMATOCRIT 40.4 37.4  --  39.7  --  37.7 39.2  --   --    < > 44.5   PLATELETS 208 157  --  146  --  127* 109*  --   --    < > 127*   NEUTROS ABS 3.43 2.76  --  3.02  --  3.20 2.56  --   --    < > 1.92   IMMATURE GRANS (ABS) 0.05 0.02  --  0.01  --  0.01 0.01  --   --    < > 0.01   LYMPHS ABS 1.96 2.45  --  1.94  --  1.24 1.29  --   --    < > 1.71   MONOS ABS 0.38 0.35  --  0.43  --  0.33 0.32  --   --    < > 0.58   EOS ABS 0.01 0.11  --  0.00  --  0.00 0.00  --   --    < > 0.05   MCV 89.2 86.6  --  87.8  --  89.1 87.9  --   --    < > 89.4   SED RATE  --   --   --   --   --   --   --   --   --   --  24*   CRP 0.40 0.74* 1.71*  --  3.45*  --   --   --  2.62*   < > 1.12*   PROCALCITONIN  --  0.05 0.06  --   --   --   --   --  0.04  --  0.07   LACTATE  --   --   --   --   --   --    --   --  1.0  --  2.0   LDH  --   --   --   --   --   --   --   --   --   --  167   D DIMER QUANT  --   --   --   --   --   --   --  0.80*  --   --  0.75*    < > = values in this interval not displayed.         Lab 12/05/21  0345 12/04/21 0309 12/03/21 0359 12/02/21 2308 12/02/21  0431 12/01/21  0836   SODIUM 138 140  --  142 139 135*   POTASSIUM 4.3 3.8  --  4.1 4.1 4.4   CHLORIDE 105 107  --  109* 105 102   CO2 22.0 22.0  --  22.0 21.0* 22.0   ANION GAP 11.0 11.0  --  11.0 13.0 11.0   BUN 13 11  --  13 10 9   CREATININE 0.52* 0.48*  --  0.71 0.70 0.79   GLUCOSE 117* 109*  --  115* 122* 104*   CALCIUM 8.3* 8.4*  --  8.0* 8.1* 8.1*   MAGNESIUM 2.2 1.9  --  2.6 1.9 1.7   PHOSPHORUS  --  2.7 2.3*  --   --   --          Lab 12/05/21 0345 12/04/21 0309 12/02/21 2308 12/02/21  0431 12/01/21  0836   TOTAL PROTEIN 6.4 6.4 6.4 6.5 6.2   ALBUMIN 3.70 3.50 3.60 3.80 3.70   GLOBULIN 2.7 2.9 2.8 2.7 2.5   ALT (SGPT) 21 17 19 22 26   AST (SGOT) 17 16 19 22 29   BILIRUBIN 0.2 0.2 0.2 <0.2 0.2   ALK PHOS 67 64 75 81 86         Lab 12/03/21  0512 12/03/21 0359 12/03/21  0146 12/02/21 2308 12/01/21  0836 11/29/21  1047 11/29/21  1047   PROBNP  --   --   --  162.3  --   --  5.7   TROPONIN T <0.010 <0.010  <0.010 <0.010 <0.010 <0.010   < > <0.010    < > = values in this interval not displayed.                 Brief Urine Lab Results  (Last result in the past 365 days)      Color   Clarity   Blood   Leuk Est   Nitrite   Protein   CREAT   Urine HCG        11/29/21 1132               Negative           Microbiology Results (last 10 days)     Procedure Component Value - Date/Time    COVID-19, [facility] IN-HOUSE NP/OP SWAB IN UTM/VTM/SALINE TRANSPORT MEDIA 24HR TAT - Swab, Nasopharynx [[id number]]  (Abnormal) Collected: 12/01/21 1413    Lab Status: Final result Specimen: Swab from Nasopharynx Updated: 12/01/21 2158     COVID19 Detected    Narrative:      Fact sheet for providers: https://www.fda.gov/media/597342/download      Fact sheet for patients: https://www.fda.gov/media/100555/download    Test performed by RT PCR.    Blood Culture - Blood, Hand, Left [[id number]]  (Normal) Collected: 12/01/21 1118    Lab Status: Preliminary result Specimen: Blood from Hand, Left Updated: 12/05/21 1145     Blood Culture No growth at 4 days    Blood Culture - Blood, Arm, Left [[id number]]  (Normal) Collected: 12/01/21 1100    Lab Status: Preliminary result Specimen: Blood from Arm, Left Updated: 12/05/21 1145     Blood Culture No growth at 4 days    Blood Culture - Blood, Arm, Left [[id number]]  (Normal) Collected: 11/29/21 1047    Lab Status: Final result Specimen: Blood from Arm, Left Updated: 12/04/21 1130     Blood Culture No growth at 5 days    Blood Culture - Blood, Arm, Right [[id number]]  (Normal) Collected: 11/29/21 1047    Lab Status: Final result Specimen: Blood from Arm, Right Updated: 12/04/21 1130     Blood Culture No growth at 5 days          Adult Transthoracic Echo Complete W/ Cont if Necessary Per Protocol    Result Date: 12/2/2021  · Left ventricular ejection fraction appears to be 61 - 65%. Left ventricular systolic function is normal. · Left ventricular diastolic function was normal. · Estimated right ventricular systolic pressure from tricuspid regurgitation is normal (<35 mmHg). Calculated right ventricular systolic pressure from tricuspid regurgitation is 28 mmHg.      XR Chest 1 View    Result Date: 12/2/2021  PROCEDURE: CR Chest 1 Vw COMPARISON:  12/1/2021 INDICATIONS: chest pressure; Nausea with vomiting, unspecified;COVID-19;Hypoxemia;Syncope and collapse;COVID-19;Pneumonia due to coronavirus disease 2019;Abnormal findings on diagnostic imaging of other specified body structures; Hypocalcemia TECHNIQUE: Single AP  view of the chest FINDINGS:  Cardiomediastinal silhouette is stable in lung volumes remain low. However increasing bilateral lower lobe infiltrates are present. Osseous structures are intact.     Bilateral lower  lobe infiltrates.  Signer Name: Treva Parish MD  Signed: 12/2/2021 11:40 PM  Workstation Name: [facility]-  [facility]    XR Chest 1 View    Result Date: 12/1/2021  EXAMINATION: XR CHEST 1 VW-12/01/2021:  INDICATION: c19, hypoxia, recent pneumonia dx; R11.2-Nausea with vomiting, unspecified; U07.1-COVID-19; R09.02-Hypoxemia; R55-Syncope and collapse.  COMPARISON: 11/29/2021.  FINDINGS: Portable chest reveals mild increased markings seen in the lung bases bilaterally slightly worsening in the interval. Lung volumes are low. Cardiac and mediastinal silhouettes are within normal limits. The bony structures are unremarkable.      Mild increased markings identified in the lung bases bilaterally. Findings have slightly worsened in the interval.  D:  12/01/2021 E:  12/01/2021  This report was finalized on 12/1/2021 4:53 PM by Dr. Marguerite Alexander MD.      XR Chest 1 View    Result Date: 11/29/2021  EXAMINATION: XR CHEST 1 VW- 11/29/2021  INDICATION: SOA triage protocol  COMPARISON: NONE  FINDINGS: Single view of the chest reveals cardiac size within normal limits. Low lung volumes with minimal hypoventilatory effects including bibasilar atelectasis. No focal consolidation. No pneumothorax or pleural effusion. Degenerative changes of the spine.      Low lung volumes with hypoventilatory effects of minimal bibasilar atelectasis however no focal consolidation or effusion.  D: 11/29/2021 E: 11/29/2021  This report was finalized on 11/29/2021 4:50 PM by Dr. Paulino Luna.      Duplex Venous Lower Extremity - Left CAR    Result Date: 12/2/2021  · Normal left lower extremity venous duplex scan.      CT Angiogram Chest    Result Date: 11/29/2021  EXAMINATION: CT ANGIOGRAM CHEST- 11/29/2021  INDICATION: SOB, elevated D-dimer  TECHNIQUE: CT angiogram chest with intravenous contrast administration following PE protocol. 2-D reconstructions performed.  The radiation dose reduction device was turned on for  each scan per the ALARA (As Low as Reasonably Achievable) protocol.  COMPARISON: NONE  FINDINGS: Thyroid is homogeneous in attenuation. No bulky mediastinal adenopathy. Central airways are patent. Esophagus in normal course and caliber. Patent nonaneurysmal thoracic aorta with patent great vessel origins. Satisfactory opacification of the pulmonary arterial tree without filling defect to suggest pulmonary embolus. Cardiac size borderline enlarged without pericardial effusion. Extended lung windows reveal low lung volumes with hypoventilatory effects. Patchy groundglass opacifications bilateral lungs mid and lower lung predominant right greater than left of mild to moderate airspace disease with mild interstitial edema pattern however no effusion. No acute osseous findings. No soft tissue body wall findings of acuity. Visualized portions of the upper abdomen unremarkable.      1. No PE. 2. Mild to moderate bilateral airspace disease groundglass opacifications right midlung predominance bronchopneumonia at least indeterminate for Covid pneumonia. No pleural effusion.  D: 11/29/2021 E: 11/29/2021   This report was finalized on 11/29/2021 4:51 PM by Dr. Paulino Luna.        Results for orders placed during the hospital encounter of 12/01/21    Duplex Venous Lower Extremity - Left CAR    Interpretation Summary  · Normal left lower extremity venous duplex scan.      Results for orders placed during the hospital encounter of 12/01/21    Duplex Venous Lower Extremity - Left CAR    Interpretation Summary  · Normal left lower extremity venous duplex scan.      Results for orders placed during the hospital encounter of 12/01/21    Adult Transthoracic Echo Complete W/ Cont if Necessary Per Protocol    Interpretation Summary  · Left ventricular ejection fraction appears to be 61 - 65%. Left ventricular systolic function is normal.  · Left ventricular diastolic function was normal.  · Estimated right ventricular systolic pressure  from tricuspid regurgitation is normal (<35 mmHg). Calculated right ventricular systolic pressure from tricuspid regurgitation is 28 mmHg.      Plan for Follow-up of Pending Labs/Results: PCP   Pending Labs     Order Current Status    Blood Culture - Blood, Arm, Left Preliminary result    Blood Culture - Blood, Hand, Left Preliminary result        Discharge Details        Discharge Medications      New Medications      Instructions Start Date   dexamethasone 6 MG tablet  Commonly known as: DECADRON   6 mg, Oral, Daily   Start Date: December 6, 2021        Changes to Medications      Instructions Start Date   bisoprolol 5 MG tablet  Commonly known as: ZEBeta  What changed:   · medication strength  · how much to take  · when to take this   5 mg, Oral, Nightly         Continue These Medications      Instructions Start Date   cetirizine 10 MG tablet  Commonly known as: zyrTEC   10 mg, Oral, Daily      cholecalciferol 25 MCG (1000 UT) tablet  Commonly known as: VITAMIN D3   1,000 Units, Oral, Daily      desvenlafaxine 100 MG 24 hr tablet  Commonly known as: PRISTIQ   TAKE ONE TABLET BY MOUTH EVERY DAY      Melatonin 10 MG tablet   1 tablet, Oral, Nightly PRN      multivitamin tablet tablet  Commonly known as: THERAGRAN   1 tablet, Oral, Daily PRN      omeprazole 20 MG capsule  Commonly known as: priLOSEC   TAKE ONE CAPSULE BY MOUTH EVERY DAY      traMADol 50 MG tablet  Commonly known as: ULTRAM   50 mg, Oral, Every 6 Hours PRN             No Known Allergies      Discharge Disposition:  Home or Self Care    Diet:  Hospital:  Diet Order   Procedures   • Diet Regular       CODE STATUS:    Code Status and Medical Interventions:   Ordered at: 12/01/21 1401     Code Status (Patient has no pulse and is not breathing):    CPR (Attempt to Resuscitate)     Medical Interventions (Patient has pulse or is breathing):    Full Support       Future Appointments   Date Time Provider Department Center   1/12/2022  9:00 AM Flaquita Anderson  [facility]   1/12/2022  9:30 AM [facility] -  [facility]  [facility]   1/24/2022  4:00 PM Rahat Mercedes MD [facility]   2/1/2022  2:00 PM Anny Chambers APRN [facility]   11/3/2022  1:30 PM Ila Vergara PA [facility]                 [facility] Mcdermott  12/05/21      Time Spent on Discharge:  I spent  40 minutes on this discharge activity which included: face-to-face encounter with the patient, reviewing the data in the system, coordination of the care with the nursing staff as well as consultants, documentation, and entering orders.

## 2021-12-05 NOTE — PLAN OF CARE
VSS.  2L O2 while sleeping.  Patient states she feels much better after getting some sleep.  No complaints of nausea this shift.  Will continue to monitor.     Problem: Adult Inpatient Plan of Care  Goal: Plan of Care Review  Outcome: Ongoing, Progressing  Goal: Patient-Specific Goal (Individualized)  Outcome: Ongoing, Progressing  Goal: Absence of Hospital-Acquired Illness or Injury  Outcome: Ongoing, Progressing  Intervention: Identify and Manage Fall Risk  Recent Flowsheet Documentation  Taken 12/5/2021 0400 by Starr Monreal RN  Safety Promotion/Fall Prevention:   activity supervised   assistive device/personal items within reach   clutter free environment maintained   nonskid shoes/slippers when out of bed   room organization consistent   safety round/check completed  Taken 12/5/2021 0200 by Starr Monreal RN  Safety Promotion/Fall Prevention:   activity supervised   assistive device/personal items within reach   clutter free environment maintained   safety round/check completed   room organization consistent   nonskid shoes/slippers when out of bed  Taken 12/5/2021 0000 by Starr Monreal RN  Safety Promotion/Fall Prevention:   activity supervised   assistive device/personal items within reach   clutter free environment maintained   nonskid shoes/slippers when out of bed   room organization consistent   safety round/check completed  Taken 12/4/2021 2200 by Starr Monreal, RN  Safety Promotion/Fall Prevention:   activity supervised   assistive device/personal items within reach   clutter free environment maintained   nonskid shoes/slippers when out of bed   safety round/check completed   room organization consistent  Taken 12/4/2021 2000 by Starr Monreal, RN  Safety Promotion/Fall Prevention:   activity supervised   assistive device/personal items within reach   clutter free environment maintained   nonskid shoes/slippers when out of bed   room organization consistent   safety round/check  completed  Intervention: Prevent Skin Injury  Recent Flowsheet Documentation  Taken 12/5/2021 0400 by Starr Monreal RN  Body Position: position changed independently  Taken 12/5/2021 0200 by Starr Monreal RN  Body Position: position changed independently  Taken 12/5/2021 0000 by Starr Monreal RN  Body Position: position changed independently  Taken 12/4/2021 2200 by Starr Monreal RN  Body Position: position changed independently  Taken 12/4/2021 2000 by Starr Monreal RN  Body Position: position changed independently  Skin Protection: adhesive use limited  Intervention: Prevent and Manage VTE (venous thromboembolism) Risk  Recent Flowsheet Documentation  Taken 12/4/2021 2000 by Starr Monreal RN  VTE Prevention/Management:   bilateral   dorsiflexion/plantar flexion performed   bleeding risk factor(s) identified  Intervention: Prevent Infection  Recent Flowsheet Documentation  Taken 12/5/2021 0400 by Starr Monreal RN  Infection Prevention:   environmental surveillance performed   visitors restricted/screened   single patient room provided   rest/sleep promoted   personal protective equipment utilized  Taken 12/5/2021 0200 by Starr Monreal RN  Infection Prevention:   environmental surveillance performed   visitors restricted/screened   single patient room provided   rest/sleep promoted   personal protective equipment utilized  Taken 12/5/2021 0000 by Starr Monreal RN  Infection Prevention:   environmental surveillance performed   visitors restricted/screened   single patient room provided   rest/sleep promoted   personal protective equipment utilized  Taken 12/4/2021 2200 by Starr Monreal RN  Infection Prevention:   environmental surveillance performed   visitors restricted/screened   single patient room provided   rest/sleep promoted   personal protective equipment utilized  Taken 12/4/2021 2000 by Starr Monreal RN  Infection Prevention:   environmental surveillance performed   visitors  restricted/screened   single patient room provided   rest/sleep promoted   personal protective equipment utilized  Goal: Optimal Comfort and Wellbeing  Outcome: Ongoing, Progressing  Intervention: Provide Person-Centered Care  Recent Flowsheet Documentation  Taken 12/4/2021 2000 by Starr Monreal RN  Trust Relationship/Rapport:   choices provided   care explained   questions answered  Goal: Readiness for Transition of Care  Outcome: Ongoing, Progressing     Problem: Fall Injury Risk  Goal: Absence of Fall and Fall-Related Injury  Outcome: Ongoing, Progressing  Intervention: Identify and Manage Contributors to Fall Injury Risk  Recent Flowsheet Documentation  Taken 12/5/2021 0400 by Starr Monreal RN  Medication Review/Management: medications reviewed  Taken 12/5/2021 0200 by Starr Monreal RN  Medication Review/Management: medications reviewed  Taken 12/5/2021 0000 by Starr Monreal RN  Medication Review/Management: medications reviewed  Taken 12/4/2021 2200 by Starr Monreal RN  Medication Review/Management: medications reviewed  Taken 12/4/2021 2000 by Starr Monreal RN  Medication Review/Management: medications reviewed  Intervention: Promote Injury-Free Environment  Recent Flowsheet Documentation  Taken 12/5/2021 0400 by Starr Monreal RN  Safety Promotion/Fall Prevention:   activity supervised   assistive device/personal items within reach   clutter free environment maintained   nonskid shoes/slippers when out of bed   room organization consistent   safety round/check completed  Taken 12/5/2021 0200 by Starr Monreal RN  Safety Promotion/Fall Prevention:   activity supervised   assistive device/personal items within reach   clutter free environment maintained   safety round/check completed   room organization consistent   nonskid shoes/slippers when out of bed  Taken 12/5/2021 0000 by Starr Monreal RN  Safety Promotion/Fall Prevention:   activity supervised   assistive device/personal items  within reach   clutter free environment maintained   nonskid shoes/slippers when out of bed   room organization consistent   safety round/check completed  Taken 12/4/2021 2200 by Starr Monreal RN  Safety Promotion/Fall Prevention:   activity supervised   assistive device/personal items within reach   clutter free environment maintained   nonskid shoes/slippers when out of bed   safety round/check completed   room organization consistent  Taken 12/4/2021 2000 by Starr Monreal RN  Safety Promotion/Fall Prevention:   activity supervised   assistive device/personal items within reach   clutter free environment maintained   nonskid shoes/slippers when out of bed   room organization consistent   safety round/check completed     Problem: Asthma Comorbidity  Goal: Maintenance of Asthma Control  Outcome: Ongoing, Progressing  Intervention: Maintain Asthma Symptom Control  Recent Flowsheet Documentation  Taken 12/5/2021 0400 by Starr Monreal RN  Medication Review/Management: medications reviewed  Taken 12/5/2021 0200 by Starr Monreal RN  Medication Review/Management: medications reviewed  Taken 12/5/2021 0000 by Starr Monreal RN  Medication Review/Management: medications reviewed  Taken 12/4/2021 2200 by Starr Monreal RN  Medication Review/Management: medications reviewed  Taken 12/4/2021 2000 by Starr Monreal RN  Medication Review/Management: medications reviewed     Problem: Hypertension Comorbidity  Goal: Blood Pressure in Desired Range  Outcome: Ongoing, Progressing  Intervention: Maintain Hypertension-Management Strategies  Recent Flowsheet Documentation  Taken 12/5/2021 0400 by Starr Monreal RN  Medication Review/Management: medications reviewed  Taken 12/5/2021 0200 by Starr Monreal RN  Medication Review/Management: medications reviewed  Taken 12/5/2021 0000 by Starr Monreal RN  Medication Review/Management: medications reviewed  Taken 12/4/2021 2200 by Starr Monreal RN  Medication  Review/Management: medications reviewed  Taken 12/4/2021 2000 by Starr Monreal, RN  Medication Review/Management: medications reviewed

## 2021-12-06 ENCOUNTER — TRANSITIONAL CARE MANAGEMENT TELEPHONE ENCOUNTER (OUTPATIENT)
Dept: CALL CENTER | Facility: HOSPITAL | Age: 36
End: 2021-12-06

## 2021-12-06 LAB
BACTERIA SPEC AEROBE CULT: NORMAL
BACTERIA SPEC AEROBE CULT: NORMAL

## 2021-12-06 RX ORDER — QUETIAPINE FUMARATE 25 MG/1
25 TABLET, FILM COATED ORAL NIGHTLY
Qty: 30 TABLET | Refills: 0 | Status: SHIPPED | OUTPATIENT
Start: 2021-12-06 | End: 2022-01-24

## 2021-12-06 RX ORDER — PROMETHAZINE HYDROCHLORIDE 25 MG/1
25 TABLET ORAL EVERY 6 HOURS PRN
Qty: 30 TABLET | Refills: 0 | Status: SHIPPED | OUTPATIENT
Start: 2021-12-06 | End: 2022-09-09

## 2021-12-06 NOTE — OUTREACH NOTE
Call Center TCM Note      Responses   [facility] patient discharged from? [place]   Does the patient have one of the following disease processes/diagnoses(primary or secondary)? COVID-19   COVID-19 underlying condition? None   TCM attempt successful? Yes   Discharge diagnosis pna d/t covid   Meds reviewed with patient/caregiver? Yes   Is the patient having any side effects they believe may be caused by any medication additions or changes? No   Does the patient have all medications ordered at discharge? Yes   Is the patient taking all medications as directed (includes completed medication regime)? Yes   Does the patient have a primary care provider?  Yes   Comments regarding PCP Pt declines assist in sched TCM FWP with PCP Dr Mercedes. States she will keep fwp 01/24/22 and will call if she needs sooner appt. PCP is aware of hospital stay and COVID PNA dx.    Does the patient have an appointment with their PCP or specialist within 7 days of discharge? No   Has the patient kept scheduled appointments due by today? N/A   Has home health visited the patient within 72 hours of discharge? N/A   Psychosocial issues? No   Did the patient receive a copy of their discharge instructions? Yes   Did the patient receive a copy of COVID-19 specific instructions? Yes   Nursing interventions Reviewed instructions with patient   What is the patient's perception of their health status since discharge? Improving   Does the patient have any of the following symptoms? None   Nursing Interventions Nurse provided patient education   Pulse Ox monitoring None   Is the patient/caregiver able to teach back steps to recovery at home? Set small, achievable goals for return to baseline health,  Practice good oral hygiene,  Eat a well-balance diet,  Rest and rebuild strength, gradually increase activity,  Weigh daily,  Make a list of questions for provider's appointment   If the patient is a current smoker, are they able to teach back  resources for cessation? Not a smoker   Is the patient/caregiver able to teach back the hierarchy of who to call/visit for symptoms/problems? PCP, Specialist, Home health nurse, Urgent Care, ED, 911 Yes   TCM call completed? Yes   Wrap up additional comments Pt states she is feeling much better s/p dx of COVID PNEUMONIA. All meds in place. No questions at this time. Pt declines assist in sched TCM FWP with PCP Dr Mercedes. States she will keep fwp 01/24/22 and will call if she needs sooner appt. PCP is aware of hospital stay and COVID PNA dx.           Aleida Lilly MA    12/6/2021, 12:58 EST

## 2021-12-07 ENCOUNTER — READMISSION MANAGEMENT (OUTPATIENT)
Dept: CALL CENTER | Facility: HOSPITAL | Age: 36
End: 2021-12-07

## 2021-12-07 NOTE — ED PROVIDER NOTES
Subjective   History of Present Illness    Chief Complaint: Foot and ankle pain  History of Present Illness: 36-year-old female fell while skating last night.  Reports foot and ankle pain moderate discomfort worse with ambulation.  States pain radiates up into her lower leg  Onset: Yesterday  Duration: Single inciting male persistent symptoms  Exacerbating / Alleviating factors: Worse with weightbearing and ambulation  Associated symptoms: None      Nurses Notes reviewed and agree, including vitals, allergies, social history and prior medical history.     REVIEW OF SYSTEMS: All systems reviewed and not pertinent unless noted.    Positive for: Right foot and ankle pain status post falling while skating    Negative for: Punctures lacerations deformity  Review of Systems    Past Medical History:   Diagnosis Date   • Abnormal uterine bleeding (AUB)    • Atrial fibrillation (HCC)    • Calcium deposits of brain     on MRI   • Cardiac abnormality    • Depression 12/16/2016   • Dysplasia of cervix    • Fibromyalgia 12/16/2016    probable   • Gestational diabetes     Class A1-Diet Controlled   • Hemochromatosis    • Hypertension 12/16/2016   • Inappropriate sinus tachycardia 12/16/2016    Electrophysiology study with radiofrequency ablation, June 2011 at [facility] in [place], [place], no data.   Echocardiogram February 2016: EF 60% to 65%, impaired relaxation noted. Trace MR, trace TR.    • Kidney stone    • Migraines    • Monospot test positive     History of positive Monospot.     • Nephrolithiasis    • Neurocardiogenic syncope     with tachycardia. Pt reports she had cardiac ablation in 2011.   • Steatohepatitis        No Known Allergies    Past Surgical History:   Procedure Laterality Date   • CARDIAC ABLATION  2011    bypass tract ablation   • CERVICAL CONIZATION, LEEP  2005    MODERATE DYSPLASIA   • CYSTOSCOPY URETEROSCOPY Left 7/10/2016    Procedure: CYSTOSCOPY, LEFT URETEROSCOPY, STONE EXTRACTION, LEFT  URETERAL STENT INSERTION UNDER FLUROSCOPY;  Surgeon: Bernardo Simental MD;  Location: [facility];  Service:    • DILATATION AND CURETTAGE  2010   • TOTAL LAPAROSCOPIC HYSTERECTOMY Bilateral 08/19/2013    SALPINGECTOMY       Family History   Problem Relation Age of Onset   • Colon cancer Paternal Grandmother    • Heart disease Mother    • Depression Mother    • Migraines Mother    • Hypertension Father    • Diabetes Father    • Hemochromatosis Father    • Hyperlipidemia Father    • Bradycardia Brother        Social History     Socioeconomic History   • Marital status:    Tobacco Use   • Smoking status: Never Smoker   • Smokeless tobacco: Never Used   Vaping Use   • Vaping Use: Never used   Substance and Sexual Activity   • Alcohol use: No   • Drug use: No   • Sexual activity: Yes     Partners: Male     Birth control/protection: Surgical           Objective   Physical Exam    CONSTITUTIONAL: Well developed, nontoxic 36-year-old  female,  in no acute distress.  VITAL SIGNS: per nursing, reviewed and noted  SKIN: exposed skin with no rashes, ulcerations or petechiae.  EYES: perrla. EOMI.  ENT: Normal voice.  Patient maintained wearing a mask throughout patient encounter due to coronavirus pandemic  RESPIRATORY:  No increased work of breathing. No retractions.   CARDIOVASCULAR:  regular rate and rhythm, no murmurs.  Good Peripheral pulses. Good cap refill to extremities.   GI: Abdomen soft, nontender, normal bowel sounds. No hernia. No ascites.  MUSCULOSKELETAL: Tenderness palpation to the right foot dorsally as well as diffuse ankle tenderness and mild shin tenderness.  Full ROM. Strength and tone grossly normal.  no spasms. no neck or back tenderness or spasm.   NEUROLOGIC: Alert, oriented x 3. No gross deficits. GCS 15.   PSYCH: appropriate affect.  : no bladder tenderness or distention, no CVA tenderness      Procedures     No attending physician procedures were performed on this patient.      ED  Course      Right foot x-ray 3 view interpreted by me reveals no fractures dislocations    Right tibia-fibula x-ray interpreted by me reveals no fractures or dislocations no foreign bodies                                           MDM  Patient placed in walking boot, short course tramadol.  Nasir reviewed.  Outpatient follow-up with orthopedist return precautions discussed.  Final diagnoses:   Foot sprain, right, initial encounter       ED Disposition  ED Disposition     ED Disposition Condition Comment    Discharge Stable           [facility] Emergency Department  [address] [facility] [address] [phone]    As needed, If symptoms worsen    Niranjan Alberto MD  [address]  [place] [id number] [phone]    Call   for follow up., As needed         Medication List      New Prescriptions    traMADol 50 MG tablet  Commonly known as: ULTRAM  Take 1 tablet by mouth Every 6 (Six) Hours As Needed for Moderate Pain .           Where to Get Your Medications      These medications were sent to [facility] - [place], [place] - [address] - [phone]  - [phone]   [address] [facility] [address]    Phone: [phone]   · traMADol 50 MG tablet          Yao Jung,   12/07/21 0425

## 2021-12-07 NOTE — OUTREACH NOTE
COVID-19 Week 1 Survey      Responses   [facility] patient discharged from? [place]   Does the patient have one of the following disease processes/diagnoses(primary or secondary)? COVID-19   COVID-19 underlying condition? None   Call Number Call 2   Week 1 Call successful? Yes   Call start time 1202   Revoke Decline to participate  [Has a client arriving for appt]   Call end time 1203   Discharge diagnosis pna d/t covid          Jaclyn Samaniego RN

## 2021-12-09 ENCOUNTER — TELEMEDICINE (OUTPATIENT)
Dept: CARDIOLOGY | Facility: HOSPITAL | Age: 36
End: 2021-12-09

## 2021-12-09 VITALS
OXYGEN SATURATION: 98 % | DIASTOLIC BLOOD PRESSURE: 80 MMHG | WEIGHT: 196.6 LBS | HEART RATE: 110 BPM | HEIGHT: 61 IN | SYSTOLIC BLOOD PRESSURE: 120 MMHG | BODY MASS INDEX: 37.12 KG/M2

## 2021-12-09 DIAGNOSIS — R00.0 INAPPROPRIATE SINUS TACHYCARDIA: Primary | ICD-10-CM

## 2021-12-09 DIAGNOSIS — R55 VASOVAGAL SYNCOPE: ICD-10-CM

## 2021-12-09 DIAGNOSIS — I10 PRIMARY HYPERTENSION: ICD-10-CM

## 2021-12-09 PROCEDURE — 99213 OFFICE O/P EST LOW 20 MIN: CPT | Performed by: NURSE PRACTITIONER

## 2021-12-22 ENCOUNTER — APPOINTMENT (OUTPATIENT)
Dept: MAMMOGRAPHY | Facility: HOSPITAL | Age: 36
End: 2021-12-22

## 2021-12-22 RX ORDER — BISOPROLOL FUMARATE 10 MG/1
10 TABLET, FILM COATED ORAL DAILY
Qty: 30 TABLET | Refills: 3 | Status: SHIPPED | OUTPATIENT
Start: 2021-12-22 | End: 2022-04-22

## 2022-01-05 DIAGNOSIS — E83.110 HEREDITARY HEMOCHROMATOSIS: Primary | ICD-10-CM

## 2022-01-06 RX ORDER — BUSPIRONE HYDROCHLORIDE 7.5 MG/1
7.5 TABLET ORAL 2 TIMES DAILY
Qty: 60 TABLET | Refills: 2 | Status: SHIPPED | OUTPATIENT
Start: 2022-01-06 | End: 2022-01-24 | Stop reason: SDUPTHER

## 2022-01-10 ENCOUNTER — LAB (OUTPATIENT)
Dept: LAB | Facility: HOSPITAL | Age: 37
End: 2022-01-10

## 2022-01-10 DIAGNOSIS — E83.110 HEREDITARY HEMOCHROMATOSIS: ICD-10-CM

## 2022-01-10 LAB
ALBUMIN SERPL-MCNC: 4.4 G/DL (ref 3.5–5.2)
ALBUMIN/GLOB SERPL: 1.3 G/DL
ALP SERPL-CCNC: 72 U/L (ref 39–117)
ALT SERPL W P-5'-P-CCNC: 37 U/L (ref 1–33)
ANION GAP SERPL CALCULATED.3IONS-SCNC: 12 MMOL/L (ref 5–15)
AST SERPL-CCNC: 28 U/L (ref 1–32)
BILIRUB SERPL-MCNC: 0.3 MG/DL (ref 0–1.2)
BUN SERPL-MCNC: 16 MG/DL (ref 6–20)
BUN/CREAT SERPL: 23.2 (ref 7–25)
CALCIUM SPEC-SCNC: 10.1 MG/DL (ref 8.6–10.5)
CHLORIDE SERPL-SCNC: 102 MMOL/L (ref 98–107)
CO2 SERPL-SCNC: 22 MMOL/L (ref 22–29)
CREAT SERPL-MCNC: 0.69 MG/DL (ref 0.57–1)
DEPRECATED RDW RBC AUTO: 42.5 FL (ref 37–54)
EOSINOPHIL # BLD MANUAL: 0.06 10*3/MM3 (ref 0–0.4)
EOSINOPHIL NFR BLD MANUAL: 1 % (ref 0.3–6.2)
ERYTHROCYTE [DISTWIDTH] IN BLOOD BY AUTOMATED COUNT: 13 % (ref 12.3–15.4)
FERRITIN SERPL-MCNC: 78.33 NG/ML (ref 13–150)
GFR SERPL CREATININE-BSD FRML MDRD: 96 ML/MIN/1.73
GLOBULIN UR ELPH-MCNC: 3.3 GM/DL
GLUCOSE SERPL-MCNC: 113 MG/DL (ref 65–99)
HCT VFR BLD AUTO: 44.8 % (ref 34–46.6)
HGB BLD-MCNC: 15.4 G/DL (ref 12–15.9)
IRON 24H UR-MRATE: 44 MCG/DL (ref 37–145)
IRON SATN MFR SERPL: 11 % (ref 20–50)
LYMPHOCYTES # BLD MANUAL: 3.2 10*3/MM3 (ref 0.7–3.1)
LYMPHOCYTES NFR BLD MANUAL: 18 % (ref 5–12)
MCH RBC QN AUTO: 30.4 PG (ref 26.6–33)
MCHC RBC AUTO-ENTMCNC: 34.4 G/DL (ref 31.5–35.7)
MCV RBC AUTO: 88.5 FL (ref 79–97)
MONOCYTES # BLD: 1.05 10*3/MM3 (ref 0.1–0.9)
NEUTROPHILS # BLD AUTO: 1.51 10*3/MM3 (ref 1.7–7)
NEUTROPHILS NFR BLD MANUAL: 26 % (ref 42.7–76)
PLAT MORPH BLD: NORMAL
PLATELET # BLD AUTO: 215 10*3/MM3 (ref 140–450)
PMV BLD AUTO: 10.5 FL (ref 6–12)
POTASSIUM SERPL-SCNC: 4.2 MMOL/L (ref 3.5–5.2)
PROT SERPL-MCNC: 7.7 G/DL (ref 6–8.5)
RBC # BLD AUTO: 5.06 10*6/MM3 (ref 3.77–5.28)
RBC MORPH BLD: NORMAL
SCAN SLIDE: NORMAL
SODIUM SERPL-SCNC: 136 MMOL/L (ref 136–145)
TIBC SERPL-MCNC: 404 MCG/DL (ref 298–536)
TRANSFERRIN SERPL-MCNC: 271 MG/DL (ref 200–360)
VARIANT LYMPHS NFR BLD MANUAL: 46 % (ref 19.6–45.3)
VARIANT LYMPHS NFR BLD MANUAL: 9 % (ref 0–5)
WBC MORPH BLD: NORMAL
WBC NRBC COR # BLD: 5.82 10*3/MM3 (ref 3.4–10.8)

## 2022-01-10 PROCEDURE — 82728 ASSAY OF FERRITIN: CPT

## 2022-01-10 PROCEDURE — 85007 BL SMEAR W/DIFF WBC COUNT: CPT

## 2022-01-10 PROCEDURE — 36415 COLL VENOUS BLD VENIPUNCTURE: CPT

## 2022-01-10 PROCEDURE — 84466 ASSAY OF TRANSFERRIN: CPT

## 2022-01-10 PROCEDURE — 80053 COMPREHEN METABOLIC PANEL: CPT

## 2022-01-10 PROCEDURE — 85025 COMPLETE CBC W/AUTO DIFF WBC: CPT

## 2022-01-10 PROCEDURE — 83540 ASSAY OF IRON: CPT

## 2022-01-12 ENCOUNTER — OFFICE VISIT (OUTPATIENT)
Dept: ONCOLOGY | Facility: CLINIC | Age: 37
End: 2022-01-12

## 2022-01-12 ENCOUNTER — APPOINTMENT (OUTPATIENT)
Dept: ONCOLOGY | Facility: HOSPITAL | Age: 37
End: 2022-01-12

## 2022-01-12 VITALS
HEIGHT: 61 IN | SYSTOLIC BLOOD PRESSURE: 111 MMHG | OXYGEN SATURATION: 98 % | HEART RATE: 81 BPM | BODY MASS INDEX: 38.51 KG/M2 | TEMPERATURE: 96.9 F | DIASTOLIC BLOOD PRESSURE: 74 MMHG | RESPIRATION RATE: 12 BRPM | WEIGHT: 204 LBS

## 2022-01-12 DIAGNOSIS — E83.110 HEREDITARY HEMOCHROMATOSIS: Primary | ICD-10-CM

## 2022-01-12 PROCEDURE — 99214 OFFICE O/P EST MOD 30 MIN: CPT | Performed by: NURSE PRACTITIONER

## 2022-01-12 RX ORDER — SODIUM CHLORIDE 9 MG/ML
250 INJECTION, SOLUTION INTRAVENOUS ONCE
Status: CANCELLED | OUTPATIENT
Start: 2022-01-12

## 2022-01-12 NOTE — PROGRESS NOTES
"CHIEF COMPLAINT: Management of hemochromatosis.    Problem List:  Oncology/Hematology History Overview Note   1. 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  No other significant upper abdominal pathology. Liver otherwise appears unremarkable.   2. History of hysterectomy.   3. History of neurocardiogenic syncope status post cardiac ablation by Dr. Gentile.   4.  Passage of kidney stone with retrieval July 2016  5.  Diastolic hypertension  6.  Degenerative arthritis felt to be related to hemochromatosis according to pathology     Hemochromatosis   5/1/2007 Initial Diagnosis    Hemochromatosis     9/27/2017 Imaging    MRI liver iron quantitation  Impression:     Moderate-to-severe iron deposition in the liver measuring  280 umol per gram with 300 umol per gram defining \"major iron overload\".  In May 2016 the measurement was 270 umol per gram.             10/17/2017 Imaging    CT of the abdomen and pelvis with contrast: Changes of mild hepato-steatosis.  Rim-enhancing lesion within the right ovary possibly representing a resolving cyst.         HISTORY OF PRESENT ILLNESS:  The patient is a 36 y.o. female, here for follow up on management of hemochromatosis.  She had recent labs.  Migraines are stable. No recent rashes. She had COVID in November and still recovering. She was in the hospital for about a week. She is still having significant fatigue. She also reports she had vaccine and is experiencing some side effects from that as well. She had a temp and some joint aches that has continued through today.       Past Medical History:   Diagnosis Date   • Abnormal uterine bleeding (AUB)    • Atrial fibrillation (HCC)    • Calcium deposits of brain     on MRI   • Cardiac abnormality    • Depression 12/16/2016   • Dysplasia of cervix    • Fibromyalgia " 12/16/2016    probable   • Gestational diabetes     Class A1-Diet Controlled   • Hemochromatosis    • Hypertension 12/16/2016   • Inappropriate sinus tachycardia 12/16/2016    Electrophysiology study with radiofrequency ablation, June 2011 at [facility] in [place], [place], no data.   Echocardiogram February 2016: EF 60% to 65%, impaired relaxation noted. Trace MR, trace TR.    • Kidney stone    • Migraines    • Monospot test positive     History of positive Monospot.     • Nephrolithiasis    • Neurocardiogenic syncope     with tachycardia. Pt reports she had cardiac ablation in 2011.   • Steatohepatitis      Past Surgical History:   Procedure Laterality Date   • CARDIAC ABLATION  2011    bypass tract ablation   • CERVICAL CONIZATION, LEEP  2005    MODERATE DYSPLASIA   • CYSTOSCOPY URETEROSCOPY Left 7/10/2016    Procedure: CYSTOSCOPY, LEFT URETEROSCOPY, STONE EXTRACTION, LEFT URETERAL STENT INSERTION UNDER FLUROSCOPY;  Surgeon: Bernardo Simental MD;  Location: [facility];  Service:    • DILATATION AND CURETTAGE  2010   • TOTAL LAPAROSCOPIC HYSTERECTOMY Bilateral 08/19/2013    SALPINGECTOMY       No Known Allergies    Family History and Social History reviewed and changed as necessary      REVIEW OF SYSTEM:   Review of Systems   Constitutional: Negative for appetite change, chills, diaphoresis, fatigue, fever and unexpected weight change.   HENT:   Negative for mouth sores, sore throat and trouble swallowing.    Eyes: Negative for icterus.   Respiratory: Negative for cough, hemoptysis and shortness of breath.    Cardiovascular: Negative for chest pain, leg swelling and palpitations.   Gastrointestinal: Negative for abdominal distention, abdominal pain, blood in stool, constipation, diarrhea, nausea and vomiting.   Endocrine: Negative for hot flashes.   Genitourinary: Negative for bladder incontinence, difficulty urinating, dysuria, frequency and hematuria.    Musculoskeletal: Negative for gait problem, neck  "pain and neck stiffness.   Skin: Negative for rash.   Neurological: Negative for dizziness, gait problem, headaches, light-headedness and numbness.   Hematological: Negative for adenopathy. Does not bruise/bleed easily.   Psychiatric/Behavioral: Negative for depression. The patient is not nervous/anxious.    All other systems reviewed and are negative.       PHYSICAL EXAM    Vitals:    01/12/22 0832   BP: 111/74   Pulse: 81   Resp: 12   Temp: 96.9 °F (36.1 °C)   TempSrc: Temporal   SpO2: 98%   Weight: 92.5 kg (204 lb)   Height: 154.9 cm (61\")     Constitutional: Appears well-developed and well-nourished. No distress.   ECOG: (0) Fully active, able to carry on all predisease performance without restriction  HENT:   Head: Normocephalic.  Malar rash unchanged  Mouth/Throat: Oropharynx is clear and moist.   Eyes: Conjunctivae are normal. Pupils are equal, round, and reactive to light. No scleral icterus.   Neck: Neck supple. No JVD present. No thyromegaly present.   Cardiovascular: Normal rate, regular rhythm and normal heart sounds.    Pulmonary/Chest: Breath sounds normal. No respiratory distress.   Abdominal: Soft. Exhibits no distension and no mass. There is no hepatosplenomegaly. There is no tenderness. There is no rebound and no guarding.   Musculoskeletal:Exhibits no edema, tenderness or deformity.   Neurological: Alert and oriented to person, place, and time. Exhibits normal muscle tone.   Skin: No ecchymosis, no petechiae and no rash noted. Not diaphoretic. No cyanosis. Nails show no clubbing.   Psychiatric: Normal mood and affect.   Vitals reviewed.      No radiology results for the last 7 days       ASSESSMENT & PLAN:    1.  Compound heterozygous hemochromatosis  2. Vasovagal syncope  3. Diastolic hypertension  4. Malar rash with arthritis    Discussion: She has not had a phlebotomy since November 2017. We discussed labs showed ferritin is 78.  She does need phlebotomy today. However, in light of her recent " illness as well as side effects from vaccine we will hold off for one month to let her have time to recover.   We will plan for phlebotomy in one month. We reviewed out goal is ferritin less than 50. Normally, below 100 is our target, however, rheumatology believes her hemachromatosis is likely the source of her joint aches.      She does have some CBC changes but htat could be related to recent illness as well as vaccine. We will plan to recheck in one month with phlebotomy. CMP unremarkable.    We will see her back in 6 months with blood counts and iron panel and CMP prior to return.     Obesity.  I encouraged the patient to continue to exercise and continue watching her caloric intake.  She has lost about 2lbs.    She will continue to follow up with rheumatology prn.         Flaquita Anderson, APRN  01/12/22

## 2022-01-24 ENCOUNTER — OFFICE VISIT (OUTPATIENT)
Dept: INTERNAL MEDICINE | Facility: CLINIC | Age: 37
End: 2022-01-24

## 2022-01-24 VITALS
OXYGEN SATURATION: 97 % | HEIGHT: 61 IN | DIASTOLIC BLOOD PRESSURE: 70 MMHG | WEIGHT: 208.6 LBS | HEART RATE: 99 BPM | RESPIRATION RATE: 16 BRPM | BODY MASS INDEX: 39.38 KG/M2 | TEMPERATURE: 98.4 F | SYSTOLIC BLOOD PRESSURE: 118 MMHG

## 2022-01-24 DIAGNOSIS — R73.9 HYPERGLYCEMIA: ICD-10-CM

## 2022-01-24 DIAGNOSIS — Z13.220 LIPID SCREENING: ICD-10-CM

## 2022-01-24 DIAGNOSIS — R55 VASOVAGAL SYNCOPE: ICD-10-CM

## 2022-01-24 DIAGNOSIS — I10 PRIMARY HYPERTENSION: ICD-10-CM

## 2022-01-24 DIAGNOSIS — F33.1 MODERATE RECURRENT MAJOR DEPRESSION: ICD-10-CM

## 2022-01-24 DIAGNOSIS — Z00.00 PREVENTATIVE HEALTH CARE: Primary | ICD-10-CM

## 2022-01-24 DIAGNOSIS — E55.9 VITAMIN D DEFICIENCY: ICD-10-CM

## 2022-01-24 PROCEDURE — 99395 PREV VISIT EST AGE 18-39: CPT | Performed by: INTERNAL MEDICINE

## 2022-01-24 PROCEDURE — 2014F MENTAL STATUS ASSESS: CPT | Performed by: INTERNAL MEDICINE

## 2022-01-24 PROCEDURE — 3008F BODY MASS INDEX DOCD: CPT | Performed by: INTERNAL MEDICINE

## 2022-01-24 RX ORDER — QUETIAPINE FUMARATE 25 MG/1
25 TABLET, FILM COATED ORAL NIGHTLY
Qty: 30 TABLET | Refills: 0 | Status: CANCELLED | OUTPATIENT
Start: 2022-01-24

## 2022-01-24 RX ORDER — BUSPIRONE HYDROCHLORIDE 15 MG/1
7.5 TABLET ORAL 2 TIMES DAILY
Qty: 60 TABLET | Refills: 5 | Status: SHIPPED | OUTPATIENT
Start: 2022-01-24 | End: 2022-01-24 | Stop reason: SDUPTHER

## 2022-01-24 RX ORDER — BUSPIRONE HYDROCHLORIDE 15 MG/1
15 TABLET ORAL 2 TIMES DAILY
Qty: 60 TABLET | Refills: 5 | Status: SHIPPED | OUTPATIENT
Start: 2022-01-24 | End: 2022-08-17

## 2022-01-24 NOTE — PROGRESS NOTES
[facility]     Cristina Haines  1985   [id number]      Patient Care Team:  Rahat Mercedes MD as PCP - General  Rahat Mercedes MD as PCP - Family Medicine  Yonas Pavon MD as Consulting Physician (Hematology and Oncology)  Jared Spence MD as Consulting Physician (Gastroenterology)  Dashawn Gentile MD as Consulting Physician (Cardiology)    Chief Complaint::   Chief Complaint   Patient presents with   • Annual Exam   • Atrial Fibrillation        HPI  ***    Chronic Conditions:  ***    Patient Active Problem List   Diagnosis   • Urolithiasis   • Hyperglycemia   • Hemochromatosis   • Vasovagal syncope   • Inappropriate sinus tachycardia   • Hypertension   • History of migraine headaches   • Fibromyalgia   • Depression   • Tachycardia   • Atrial fibrillation (HCC)   • Chest pain   • Other disorders of iron metabolism   • External hemorrhoids   • Generalized abdominal pain   • Dyslipidemia   • Irritable bowel syndrome   • Moderate recurrent major depression (HCC)   • Morbid obesity (HCC)   • Syncope   • Neck pain   • Vitamin D deficiency   • Non-intractable vomiting   • Pneumonia due to COVID-19 virus        Past Medical History:   Diagnosis Date   • Abnormal uterine bleeding (AUB)    • Atrial fibrillation (HCC)    • Calcium deposits of brain     on MRI   • Cardiac abnormality    • Depression 12/16/2016   • Dysplasia of cervix    • Fibromyalgia 12/16/2016    probable   • Gestational diabetes     Class A1-Diet Controlled   • Hemochromatosis    • Hypertension 12/16/2016   • Inappropriate sinus tachycardia 12/16/2016    Electrophysiology study with radiofrequency ablation, June 2011 at [facility] in [place], [place], no data.   Echocardiogram February 2016: EF 60% to 65%, impaired relaxation noted. Trace MR, trace TR.    • Kidney stone    • Migraines    • Monospot test positive     History of positive Monospot.     • Nephrolithiasis    • Neurocardiogenic syncope      with tachycardia. Pt reports she had cardiac ablation in 2011.   • Steatohepatitis        Past Surgical History:   Procedure Laterality Date   • CARDIAC ABLATION  2011    bypass tract ablation   • CERVICAL CONIZATION, LEEP  2005    MODERATE DYSPLASIA   • CYSTOSCOPY URETEROSCOPY Left 7/10/2016    Procedure: CYSTOSCOPY, LEFT URETEROSCOPY, STONE EXTRACTION, LEFT URETERAL STENT INSERTION UNDER FLUROSCOPY;  Surgeon: Bernardo Simental MD;  Location: [facility];  Service:    • DILATATION AND CURETTAGE  2010   • TOTAL LAPAROSCOPIC HYSTERECTOMY Bilateral 08/19/2013    SALPINGECTOMY       Family History   Problem Relation Age of Onset   • Colon cancer Paternal Grandmother    • Heart disease Mother    • Depression Mother    • Migraines Mother    • Hypertension Father    • Diabetes Father    • Hemochromatosis Father    • Hyperlipidemia Father    • Bradycardia Brother        Social History     Socioeconomic History   • Marital status:    Tobacco Use   • Smoking status: Never Smoker   • Smokeless tobacco: Never Used   Vaping Use   • Vaping Use: Never used   Substance and Sexual Activity   • Alcohol use: No   • Drug use: No   • Sexual activity: Yes     Partners: Male     Birth control/protection: Surgical       No Known Allergies      Current Outpatient Medications:   •  bisoprolol (ZEBeta) 10 MG tablet, Take 1 tablet by mouth Daily., Disp: 30 tablet, Rfl: 3  •  busPIRone (BUSPAR) 7.5 MG tablet, Take 1 tablet by mouth 2 (Two) Times a Day., Disp: 60 tablet, Rfl: 2  •  cetirizine (zyrTEC) 10 MG tablet, Take 10 mg by mouth Daily., Disp: , Rfl:   •  cholecalciferol (VITAMIN D3) 25 MCG (1000 UT) tablet, Take 1,000 Units by mouth Daily., Disp: , Rfl:   •  desvenlafaxine (PRISTIQ) 100 MG 24 hr tablet, TAKE ONE TABLET BY MOUTH EVERY DAY, Disp: 30 tablet, Rfl: 4  •  Melatonin 10 MG tablet, Take 1 tablet by mouth At Night As Needed., Disp: , Rfl:   •  Multiple Vitamin (MULTI-VITAMIN DAILY PO), Take 1 tablet by mouth Daily As Needed.,  Disp: , Rfl:   •  omeprazole (priLOSEC) 20 MG capsule, TAKE ONE CAPSULE BY MOUTH EVERY DAY, Disp: 30 capsule, Rfl: 5  •  promethazine (PHENERGAN) 25 MG tablet, Take 1 tablet by mouth Every 6 (Six) Hours As Needed for Nausea or Vomiting., Disp: 30 tablet, Rfl: 0  •  QUEtiapine (SEROquel) 25 MG tablet, Take 1 tablet by mouth Every Night., Disp: 30 tablet, Rfl: 0  •  traMADol (ULTRAM) 50 MG tablet, Take 1 tablet by mouth Every 6 (Six) Hours As Needed for Moderate Pain ., Disp: 12 tablet, Rfl: 0    Review of Systems     Vital Signs  There were no vitals filed for this visit.    Physical Exam     Procedures    ACE III MINI             Assessment/Plan:    There are no diagnoses linked to this encounter.      Plan of care reviewed with patient at the conclusion of today's visit. Education was provided regarding diagnosis, management, and any prescribed or recommended OTC medications.Patient verbalizes understanding of and agreement with management plan.         Allegra Monreal MA

## 2022-01-25 NOTE — PROGRESS NOTES
[facility]     Mickiemichael Haines  1985   [id number]      Patient Care Team:  Rahat Mercedes MD as PCP - General  Rahat Mercedes MD as PCP - Family Medicine  Yonas Pavon MD as Consulting Physician (Hematology and Oncology)  Jared Spence MD as Consulting Physician (Gastroenterology)  Dashawn Gentile MD as Consulting Physician (Cardiology)    Chief Complaint::   Chief Complaint   Patient presents with   • Annual Exam   • Atrial Fibrillation        HPI    The patient presents for a physical examination for follow-up of hypertension, vasovagal syncope, vitamin D deficiency, hyperglycemia, and depression. The patient has consented to being recorded using BLAKE.    At the end of 11/2021, she developed COVID-19 and was subsequently admitted for an for nausea and vomiting. She had pneumonia due to COVID-19 and was treated with Decadron and remdesivir.    COVID-19  The patient states that she has recovered from COVID-19. She states that she received her COVID-19 booster approximately 1.5 weeks ago. She states that her hand got worse approximately 1.5 weeks ago. She states that every time she would vomit, she would completely pass out. She states that she was told that her nerve was acting up. She states that she's getting her strength back. She states that she has been really tired. She states that it has been a couple of weeks since her anxiety has increased significantly. She states that the BuSpar did help a little bit. She states that she can tell a little bit, but she thinks she could use a bit more of the BuSpar. She states that she has noticed a lot of anxiety and then she cries real easy. She states that she is out of her Seroquel. She states it helped her to sleep. She states that she noticed when she got on the scale, she had gained 5 pounds in a week.     Weight loss  She states that she was given a trial of Saxenda 3 to 4 years ago. She states that her insurance  covered it.   She states that she was not fasting the day her glucose was over 100. She states that she is going in the middle of 02/2022 to get her blood work done.    Health maintenance  She states that she is up to date on her mammography and GYN care. She states that she has an appointment coming up in 02/2022. She states that she had a partial hysterectomy. She states that she still has her ovaries. She states that she does not have a cervix.      Chronic Conditions:      Patient Active Problem List   Diagnosis   • Urolithiasis   • Hyperglycemia   • Hemochromatosis   • Vasovagal syncope   • Inappropriate sinus tachycardia   • Primary hypertension   • History of migraine headaches   • Fibromyalgia   • Depression   • Tachycardia   • Atrial fibrillation (HCC)   • Chest pain   • Other disorders of iron metabolism   • External hemorrhoids   • Generalized abdominal pain   • Dyslipidemia   • Irritable bowel syndrome   • Moderate recurrent major depression (HCC)   • Morbid obesity (HCC)   • Syncope   • Neck pain   • Vitamin D deficiency   • Non-intractable vomiting   • Pneumonia due to COVID-19 virus        Past Medical History:   Diagnosis Date   • Abnormal uterine bleeding (AUB)    • Atrial fibrillation (HCC)    • Calcium deposits of brain     on MRI   • Cardiac abnormality    • Depression 12/16/2016   • Dysplasia of cervix    • Fibromyalgia 12/16/2016    probable   • Gestational diabetes     Class A1-Diet Controlled   • Hemochromatosis    • Hypertension 12/16/2016   • Inappropriate sinus tachycardia 12/16/2016    Electrophysiology study with radiofrequency ablation, June 2011 at [facility] in [place], [place], no data.   Echocardiogram February 2016: EF 60% to 65%, impaired relaxation noted. Trace MR, trace TR.    • Kidney stone    • Migraines    • Monospot test positive     History of positive Monospot.     • Nephrolithiasis    • Neurocardiogenic syncope     with tachycardia. Pt reports she had cardiac  ablation in 2011.   • Steatohepatitis        Past Surgical History:   Procedure Laterality Date   • CARDIAC ABLATION  2011    bypass tract ablation   • CERVICAL CONIZATION, LEEP  2005    MODERATE DYSPLASIA   • CYSTOSCOPY URETEROSCOPY Left 7/10/2016    Procedure: CYSTOSCOPY, LEFT URETEROSCOPY, STONE EXTRACTION, LEFT URETERAL STENT INSERTION UNDER FLUROSCOPY;  Surgeon: Bernardo Simental MD;  Location: [facility];  Service:    • DILATATION AND CURETTAGE  2010   • TOTAL LAPAROSCOPIC HYSTERECTOMY Bilateral 08/19/2013    SALPINGECTOMY       Family History   Problem Relation Age of Onset   • Colon cancer Paternal Grandmother    • Heart disease Mother    • Depression Mother    • Migraines Mother    • Hypertension Father    • Diabetes Father    • Hemochromatosis Father    • Hyperlipidemia Father    • Bradycardia Brother        Social History     Socioeconomic History   • Marital status:    Tobacco Use   • Smoking status: Never Smoker   • Smokeless tobacco: Never Used   Vaping Use   • Vaping Use: Never used   Substance and Sexual Activity   • Alcohol use: No   • Drug use: No   • Sexual activity: Yes     Partners: Male     Birth control/protection: Surgical       No Known Allergies      Current Outpatient Medications:   •  bisoprolol (ZEBeta) 10 MG tablet, Take 1 tablet by mouth Daily., Disp: 30 tablet, Rfl: 3  •  busPIRone (BUSPAR) 15 MG tablet, Take 1 tablet by mouth 2 (Two) Times a Day., Disp: 60 tablet, Rfl: 5  •  cetirizine (zyrTEC) 10 MG tablet, Take 10 mg by mouth Daily., Disp: , Rfl:   •  cholecalciferol (VITAMIN D3) 25 MCG (1000 UT) tablet, Take 1,000 Units by mouth Daily., Disp: , Rfl:   •  desvenlafaxine (PRISTIQ) 100 MG 24 hr tablet, TAKE ONE TABLET BY MOUTH EVERY DAY, Disp: 30 tablet, Rfl: 4  •  Melatonin 10 MG tablet, Take 1 tablet by mouth At Night As Needed., Disp: , Rfl:   •  Multiple Vitamin (MULTI-VITAMIN DAILY PO), Take 1 tablet by mouth Daily As Needed., Disp: , Rfl:   •  omeprazole (priLOSEC) 20 MG  "capsule, TAKE ONE CAPSULE BY MOUTH EVERY DAY, Disp: 30 capsule, Rfl: 5  •  Liraglutide (SAXENDA) 18 MG/3ML injection pen, Inject 0.6mg under skin daily for week one, THEN 1.2mg daily for week two, THEN 1.8mg daily for week three, then 2.4mg daily for week four., Disp: 3 pen, Rfl: 0  •  promethazine (PHENERGAN) 25 MG tablet, Take 1 tablet by mouth Every 6 (Six) Hours As Needed for Nausea or Vomiting., Disp: 30 tablet, Rfl: 0  •  traMADol (ULTRAM) 50 MG tablet, Take 1 tablet by mouth Every 6 (Six) Hours As Needed for Moderate Pain ., Disp: 12 tablet, Rfl: 0    Review of Systems   Constitutional: Negative for chills, fatigue and fever.   HENT: Negative for congestion, ear pain and sinus pressure.    Respiratory: Negative for cough, chest tightness, shortness of breath and wheezing.    Cardiovascular: Negative for chest pain and palpitations.   Gastrointestinal: Negative for abdominal pain, blood in stool and constipation.   Skin: Negative for color change.   Allergic/Immunologic: Negative for environmental allergies.   Neurological: Negative for dizziness, speech difficulty and headache.   Psychiatric/Behavioral: Negative for decreased concentration. The patient is not nervous/anxious.         Vital Signs  Vitals:    01/24/22 1601   BP: 118/70   BP Location: Left arm   Patient Position: Sitting   Cuff Size: Adult   Pulse: 99   Resp: 16   Temp: 98.4 °F (36.9 °C)   TempSrc: Infrared   SpO2: 97%   Weight: 94.6 kg (208 lb 9.6 oz)   Height: 154.9 cm (61\")   PainSc: 0-No pain       Physical Exam  Vitals and nursing note reviewed.   Constitutional:       Appearance: She is well-developed. She is obese.   HENT:      Head: Normocephalic and atraumatic.      Right Ear: External ear normal.      Left Ear: External ear normal.      Nose: Nose normal.      Mouth/Throat:      Pharynx: No oropharyngeal exudate.   Eyes:      Conjunctiva/sclera: Conjunctivae normal.      Pupils: Pupils are equal, round, and reactive to light.   Neck: "      Thyroid: No thyromegaly.      Vascular: No JVD.   Cardiovascular:      Rate and Rhythm: Normal rate and regular rhythm.      Heart sounds: Normal heart sounds. No murmur heard.  No friction rub. No gallop.    Pulmonary:      Effort: Pulmonary effort is normal. No respiratory distress.      Breath sounds: Normal breath sounds. No wheezing or rales.   Chest:      Chest wall: No tenderness.   Abdominal:      General: Bowel sounds are normal. There is no distension.      Palpations: Abdomen is soft. There is no mass.      Tenderness: There is no abdominal tenderness. There is no guarding or rebound.      Hernia: No hernia is present.   Musculoskeletal:         General: No tenderness. Normal range of motion.      Cervical back: Normal range of motion and neck supple.   Lymphadenopathy:      Cervical: No cervical adenopathy.   Skin:     General: Skin is warm and dry.      Findings: No erythema or rash.   Neurological:      Mental Status: She is alert and oriented to person, place, and time.      Cranial Nerves: No cranial nerve deficit.      Sensory: No sensory deficit.      Motor: No abnormal muscle tone.      Coordination: Coordination normal.      Deep Tendon Reflexes: Reflexes normal.   Psychiatric:         Behavior: Behavior normal.         Thought Content: Thought content normal.         Judgment: Judgment normal.          Procedures    ACE III MINI             Assessment/Plan:    Diagnoses and all orders for this visit:    1. Preventative health care (Primary)        - Overall, she is doing well. She is recovering from COVID-19 and has now been boosted.         - She is up to date on mammography and GYN care.    2. Primary hypertension  -     Lipid Panel; Future        - Blood pressure is controlled on metoprolol.    3. Vasovagal syncope        - This seems to be fairly well controlled at present.    4. Vitamin D deficiency        - Vitamin D level has been normal. Continue her over the counter  supplement.    5. Hyperglycemia  -     Lipid Panel; Future  -     Hemoglobin A1c; Future        - A1c is pending.         - The treatment remains healthy diet and weight loss.    6. Moderate recurrent major depression (HCC)        - She will continue venlafaxine and increase buspirone to 15 mg twice a day.    7. Lipid screening  -     Lipid Panel; Future    8. Obesity        - BMI is 39.4, which meets criteria for morbid obesity.         - We discussed strategies to lose weight.         - She was given a trial of Saxenda.        - I have also suggested that she discontinue quetiapine as it promotes weight gain.    Patient's Body mass index is 39.41 kg/m². indicating that she is morbidly obese (BMI > 40 or > 35 with obesity - related health condition). Obesity-related health conditions include the following: hypertension. Obesity is unchanged. BMI is is above average; BMI management plan is completed. We discussed portion control, increasing exercise, joining a fitness center or start home based exercise program and pharmacologic options including Saxenda..      Other orders  -     Discontinue: busPIRone (BUSPAR) 15 MG tablet; Take 0.5 tablets by mouth 2 (Two) Times a Day.  Dispense: 60 tablet; Refill: 5  -     Liraglutide (SAXENDA) 18 MG/3ML injection pen; Inject 0.6mg under skin daily for week one, THEN 1.2mg daily for week two, THEN 1.8mg daily for week three, then 2.4mg daily for week four.  Dispense: 3 pen; Refill: 0  -     busPIRone (BUSPAR) 15 MG tablet; Take 1 tablet by mouth 2 (Two) Times a Day.  Dispense: 60 tablet; Refill: 5      She will follow up in 6 months.    Plan of care reviewed with patient at the conclusion of today's visit. Education was provided regarding diagnosis, management, and any prescribed or recommended OTC medications.Patient verbalizes understanding of and agreement with management plan.            Rahat Mercedes MD    Transcribed from ambient dictation for Rahat MIKE  MD Daren by Caitlin Nance.  01/24/22   21:22 EST    Patient verbalized consent to the visit recording.

## 2022-01-28 ENCOUNTER — PRIOR AUTHORIZATION (OUTPATIENT)
Dept: INTERNAL MEDICINE | Facility: CLINIC | Age: 37
End: 2022-01-28

## 2022-02-03 ENCOUNTER — TELEPHONE (OUTPATIENT)
Dept: INTERNAL MEDICINE | Facility: CLINIC | Age: 37
End: 2022-02-03

## 2022-02-03 DIAGNOSIS — E66.01 MORBID (SEVERE) OBESITY DUE TO EXCESS CALORIES: Primary | ICD-10-CM

## 2022-02-03 RX ORDER — PHENTERMINE HYDROCHLORIDE 37.5 MG/1
37.5 CAPSULE ORAL EVERY MORNING
Qty: 30 CAPSULE | Refills: 2 | Status: SHIPPED | OUTPATIENT
Start: 2022-02-03 | End: 2022-04-12 | Stop reason: SDUPTHER

## 2022-02-17 ENCOUNTER — INFUSION (OUTPATIENT)
Dept: ONCOLOGY | Facility: HOSPITAL | Age: 37
End: 2022-02-17

## 2022-02-17 VITALS
TEMPERATURE: 98.4 F | BODY MASS INDEX: 37.79 KG/M2 | DIASTOLIC BLOOD PRESSURE: 77 MMHG | WEIGHT: 200 LBS | HEART RATE: 88 BPM | SYSTOLIC BLOOD PRESSURE: 106 MMHG

## 2022-02-17 DIAGNOSIS — E83.110 HEREDITARY HEMOCHROMATOSIS: Primary | ICD-10-CM

## 2022-02-17 LAB
BASOPHILS # BLD AUTO: 0.05 10*3/MM3 (ref 0–0.2)
BASOPHILS NFR BLD AUTO: 0.7 % (ref 0–1.5)
DEPRECATED RDW RBC AUTO: 39.5 FL (ref 37–54)
EOSINOPHIL # BLD AUTO: 0.12 10*3/MM3 (ref 0–0.4)
EOSINOPHIL NFR BLD AUTO: 1.8 % (ref 0.3–6.2)
ERYTHROCYTE [DISTWIDTH] IN BLOOD BY AUTOMATED COUNT: 12.3 % (ref 12.3–15.4)
HCT VFR BLD AUTO: 43.6 % (ref 34–46.6)
HGB BLD-MCNC: 15.3 G/DL (ref 12–15.9)
IMM GRANULOCYTES # BLD AUTO: 0.01 10*3/MM3 (ref 0–0.05)
IMM GRANULOCYTES NFR BLD AUTO: 0.1 % (ref 0–0.5)
LYMPHOCYTES # BLD AUTO: 3.05 10*3/MM3 (ref 0.7–3.1)
LYMPHOCYTES NFR BLD AUTO: 45 % (ref 19.6–45.3)
MCH RBC QN AUTO: 30.9 PG (ref 26.6–33)
MCHC RBC AUTO-ENTMCNC: 35.1 G/DL (ref 31.5–35.7)
MCV RBC AUTO: 88.1 FL (ref 79–97)
MONOCYTES # BLD AUTO: 0.54 10*3/MM3 (ref 0.1–0.9)
MONOCYTES NFR BLD AUTO: 8 % (ref 5–12)
NEUTROPHILS NFR BLD AUTO: 3.01 10*3/MM3 (ref 1.7–7)
NEUTROPHILS NFR BLD AUTO: 44.4 % (ref 42.7–76)
NRBC BLD AUTO-RTO: 0 /100 WBC (ref 0–0.2)
PLATELET # BLD AUTO: 207 10*3/MM3 (ref 140–450)
PMV BLD AUTO: 9.9 FL (ref 6–12)
RBC # BLD AUTO: 4.95 10*6/MM3 (ref 3.77–5.28)
WBC NRBC COR # BLD: 6.78 10*3/MM3 (ref 3.4–10.8)

## 2022-02-17 PROCEDURE — 85025 COMPLETE CBC W/AUTO DIFF WBC: CPT

## 2022-02-17 PROCEDURE — 99195 PHLEBOTOMY: CPT

## 2022-02-17 RX ORDER — SODIUM CHLORIDE 9 MG/ML
250 INJECTION, SOLUTION INTRAVENOUS ONCE
Status: CANCELLED | OUTPATIENT
Start: 2022-02-24

## 2022-02-17 RX ORDER — SODIUM CHLORIDE 9 MG/ML
250 INJECTION, SOLUTION INTRAVENOUS ONCE
Status: DISCONTINUED | OUTPATIENT
Start: 2022-02-17 | End: 2022-02-17 | Stop reason: HOSPADM

## 2022-02-21 ENCOUNTER — OFFICE VISIT (OUTPATIENT)
Dept: OBSTETRICS AND GYNECOLOGY | Facility: CLINIC | Age: 37
End: 2022-02-21

## 2022-02-21 VITALS
BODY MASS INDEX: 38.55 KG/M2 | RESPIRATION RATE: 16 BRPM | WEIGHT: 204 LBS | DIASTOLIC BLOOD PRESSURE: 70 MMHG | SYSTOLIC BLOOD PRESSURE: 118 MMHG

## 2022-02-21 DIAGNOSIS — Z01.419 ENCOUNTER FOR GYNECOLOGICAL EXAMINATION WITHOUT ABNORMAL FINDING: Primary | ICD-10-CM

## 2022-02-21 PROCEDURE — 99395 PREV VISIT EST AGE 18-39: CPT | Performed by: NURSE PRACTITIONER

## 2022-02-21 PROCEDURE — 3008F BODY MASS INDEX DOCD: CPT | Performed by: NURSE PRACTITIONER

## 2022-02-21 PROCEDURE — 2014F MENTAL STATUS ASSESS: CPT | Performed by: NURSE PRACTITIONER

## 2022-02-21 NOTE — PROGRESS NOTES
Annual Visit     Patient Name: Cristina Haines  : 1985   MRN: [id number]   Care Team: Patient Care Team:  Rahat Mercedes MD as PCP - General  Rahat Mercedes MD as PCP - Family Medicine  Yonas Pavon MD as Consulting Physician (Hematology and Oncology)  Jared Spence MD as Consulting Physician (Gastroenterology)  Dashawn Gentile MD as Consulting Physician (Cardiology)    Chief Complaint:    Chief Complaint   Patient presents with   • Annual Exam       HPI: Cristina Haines is a 36 y.o. year old  presenting to be seen for her gynecologic exam.   S/p total hyst with bilateral salpingectomy d/t endometriosis and adenomyosis in 2013 - ovaries remain     CBE done in 2021 - right breast lump noted approx 1cm size at 3 o'clock approx 8cm from areolar border   States she had dx mammogram and u/s done at [facility] and findings were benign   States the radiologist told her it was fibrocystic/dense tissue and nothing of concern - start mammograms at age 39   She performs monthly SBEs and hasn't noticed any changes since that time     Hx a. 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      Objective     Physical Exam    Neuro: alert and oriented to person, place and time   General:  alert; cooperative; well developed; well nourished   Skin:  No suspicious lesions seen    Thyroid: normal to inspection and palpation   Lungs:  breathing is unlabored  clear to auscultation bilaterally   Heart:  regular rate and rhythm, S1, S2 normal, no murmur, click, rub or gallop  normal apical impulse   Breasts:  Examined in supine position  Symmetric without masses or skin dimpling  Nipples normal without inversion, lesions or discharge  There are no palpable axillary nodes  Fibrocystic changes are present both breasts without a discrete mass   Abdomen: soft, non-tender; no masses  no umbilical or inguinal hernias are present  no hepato-splenomegaly   Pelvis: Clinical staff was present for exam  External genitalia:  normal appearance of the external genitalia including Bartholin's and Colmesneil's glands. ?skin tag right labia majora approx 3mm size - flesh colored and flat   :  urethral meatus normal;  Vaginal:  normal pink mucosa without prolapse or lesions.  Cervix:  absent.  Uterus:  absent.  Adnexa:  normal bimanual exam of the adnexa.  Rectal:  digital rectal exam not performed; anus visually normal appearing.         Assessment / Plan      Assessment  Problems Addressed This Visit    ICD-10-CM ICD-9-CM   1. Encounter for gynecological examination without abnormal finding  Z01.419 V72.31       Plan    Discussed monthly SBEs and fibrocystic breast changes   CBE WNL today   If changes noted on SBEs she will RTC - pt v/u     Discussed ? Skin tag  She states Dr. Young told her it was nothing of concern - has been present since pregnancy in 2013   States Dr. Young applied TCA in the office but that was not helpful   She hasn't seen dermatology - I recommend she see them for further txment options   She will let me know if referral is needed     AV 1 yr         19 to 39: Counseling/Anticipatory Guidance Discussed: breast cancer and self breast exams    Follow Up  Return in about 1 year (around 2/21/2023) for Annual physical.  Patient was given instructions and counseling regarding her condition or for  health maintenance advice. Please see specific information pulled into the AVS if appropriate.     Anny Chambers, VERITO  February 21, 2022  16:07 EST

## 2022-02-24 ENCOUNTER — INFUSION (OUTPATIENT)
Dept: ONCOLOGY | Facility: HOSPITAL | Age: 37
End: 2022-02-24

## 2022-02-24 VITALS
TEMPERATURE: 98.2 F | SYSTOLIC BLOOD PRESSURE: 122 MMHG | HEART RATE: 86 BPM | WEIGHT: 199.2 LBS | RESPIRATION RATE: 16 BRPM | BODY MASS INDEX: 37.64 KG/M2 | DIASTOLIC BLOOD PRESSURE: 79 MMHG

## 2022-02-24 DIAGNOSIS — E83.110 HEREDITARY HEMOCHROMATOSIS: Primary | ICD-10-CM

## 2022-02-24 LAB
BASOPHILS # BLD AUTO: 0.03 10*3/MM3 (ref 0–0.2)
BASOPHILS NFR BLD AUTO: 0.6 % (ref 0–1.5)
DEPRECATED RDW RBC AUTO: 41.1 FL (ref 37–54)
EOSINOPHIL # BLD AUTO: 0.1 10*3/MM3 (ref 0–0.4)
EOSINOPHIL NFR BLD AUTO: 1.8 % (ref 0.3–6.2)
ERYTHROCYTE [DISTWIDTH] IN BLOOD BY AUTOMATED COUNT: 12.7 % (ref 12.3–15.4)
FERRITIN SERPL-MCNC: 25.9 NG/ML (ref 13–150)
HCT VFR BLD AUTO: 39.3 % (ref 34–46.6)
HGB BLD-MCNC: 13.8 G/DL (ref 12–15.9)
IMM GRANULOCYTES # BLD AUTO: 0.01 10*3/MM3 (ref 0–0.05)
IMM GRANULOCYTES NFR BLD AUTO: 0.2 % (ref 0–0.5)
LYMPHOCYTES # BLD AUTO: 2.65 10*3/MM3 (ref 0.7–3.1)
LYMPHOCYTES NFR BLD AUTO: 48.6 % (ref 19.6–45.3)
MCH RBC QN AUTO: 31.1 PG (ref 26.6–33)
MCHC RBC AUTO-ENTMCNC: 35.1 G/DL (ref 31.5–35.7)
MCV RBC AUTO: 88.5 FL (ref 79–97)
MONOCYTES # BLD AUTO: 0.35 10*3/MM3 (ref 0.1–0.9)
MONOCYTES NFR BLD AUTO: 6.4 % (ref 5–12)
NEUTROPHILS NFR BLD AUTO: 2.31 10*3/MM3 (ref 1.7–7)
NEUTROPHILS NFR BLD AUTO: 42.4 % (ref 42.7–76)
NRBC BLD AUTO-RTO: 0 /100 WBC (ref 0–0.2)
PLATELET # BLD AUTO: 188 10*3/MM3 (ref 140–450)
PMV BLD AUTO: 9.9 FL (ref 6–12)
RBC # BLD AUTO: 4.44 10*6/MM3 (ref 3.77–5.28)
WBC NRBC COR # BLD: 5.45 10*3/MM3 (ref 3.4–10.8)

## 2022-02-24 PROCEDURE — 82728 ASSAY OF FERRITIN: CPT

## 2022-02-24 PROCEDURE — 36415 COLL VENOUS BLD VENIPUNCTURE: CPT

## 2022-02-24 PROCEDURE — 85025 COMPLETE CBC W/AUTO DIFF WBC: CPT

## 2022-02-24 PROCEDURE — G0463 HOSPITAL OUTPT CLINIC VISIT: HCPCS

## 2022-02-24 RX ORDER — SODIUM CHLORIDE 9 MG/ML
250 INJECTION, SOLUTION INTRAVENOUS ONCE
Status: CANCELLED | OUTPATIENT
Start: 2022-03-03

## 2022-03-01 ENCOUNTER — LAB (OUTPATIENT)
Dept: LAB | Facility: HOSPITAL | Age: 37
End: 2022-03-01

## 2022-03-01 DIAGNOSIS — Z13.220 LIPID SCREENING: ICD-10-CM

## 2022-03-01 DIAGNOSIS — I10 PRIMARY HYPERTENSION: ICD-10-CM

## 2022-03-01 DIAGNOSIS — R73.9 HYPERGLYCEMIA: ICD-10-CM

## 2022-03-01 DIAGNOSIS — E83.110 HEREDITARY HEMOCHROMATOSIS: ICD-10-CM

## 2022-03-01 LAB
ALBUMIN SERPL-MCNC: 4.1 G/DL (ref 3.5–5.2)
ALBUMIN/GLOB SERPL: 1.5 G/DL
ALP SERPL-CCNC: 49 U/L (ref 39–117)
ALT SERPL W P-5'-P-CCNC: 19 U/L (ref 1–33)
ANION GAP SERPL CALCULATED.3IONS-SCNC: 9.2 MMOL/L (ref 5–15)
AST SERPL-CCNC: 20 U/L (ref 1–32)
BILIRUB SERPL-MCNC: 0.2 MG/DL (ref 0–1.2)
BUN SERPL-MCNC: 11 MG/DL (ref 6–20)
BUN/CREAT SERPL: 12.9 (ref 7–25)
CALCIUM SPEC-SCNC: 9 MG/DL (ref 8.6–10.5)
CHLORIDE SERPL-SCNC: 104 MMOL/L (ref 98–107)
CHOLEST SERPL-MCNC: 208 MG/DL (ref 0–200)
CO2 SERPL-SCNC: 25.8 MMOL/L (ref 22–29)
CREAT SERPL-MCNC: 0.85 MG/DL (ref 0.57–1)
DEPRECATED RDW RBC AUTO: 41.9 FL (ref 37–54)
EGFRCR SERPLBLD CKD-EPI 2021: 90.6 ML/MIN/1.73
ERYTHROCYTE [DISTWIDTH] IN BLOOD BY AUTOMATED COUNT: 12.7 % (ref 12.3–15.4)
FERRITIN SERPL-MCNC: 28.05 NG/ML (ref 13–150)
GLOBULIN UR ELPH-MCNC: 2.7 GM/DL
GLUCOSE SERPL-MCNC: 104 MG/DL (ref 65–99)
HBA1C MFR BLD: 5.1 % (ref 4.8–5.6)
HCT VFR BLD AUTO: 41.3 % (ref 34–46.6)
HDLC SERPL-MCNC: 47 MG/DL (ref 40–60)
HGB BLD-MCNC: 14 G/DL (ref 12–15.9)
IRON 24H UR-MRATE: 39 MCG/DL (ref 37–145)
IRON SATN MFR SERPL: 10 % (ref 20–50)
LDLC SERPL CALC-MCNC: 132 MG/DL (ref 0–100)
LDLC/HDLC SERPL: 2.74 {RATIO}
MCH RBC QN AUTO: 30.6 PG (ref 26.6–33)
MCHC RBC AUTO-ENTMCNC: 33.9 G/DL (ref 31.5–35.7)
MCV RBC AUTO: 90.2 FL (ref 79–97)
PLATELET # BLD AUTO: 190 10*3/MM3 (ref 140–450)
PMV BLD AUTO: 10.1 FL (ref 6–12)
POTASSIUM SERPL-SCNC: 4.2 MMOL/L (ref 3.5–5.2)
PROT SERPL-MCNC: 6.8 G/DL (ref 6–8.5)
RBC # BLD AUTO: 4.58 10*6/MM3 (ref 3.77–5.28)
SCAN SLIDE: NORMAL
SODIUM SERPL-SCNC: 139 MMOL/L (ref 136–145)
TIBC SERPL-MCNC: 375 MCG/DL (ref 298–536)
TRANSFERRIN SERPL-MCNC: 252 MG/DL (ref 200–360)
TRIGL SERPL-MCNC: 160 MG/DL (ref 0–150)
VLDLC SERPL-MCNC: 29 MG/DL (ref 5–40)
WBC NRBC COR # BLD: 6.74 10*3/MM3 (ref 3.4–10.8)

## 2022-03-01 PROCEDURE — 83036 HEMOGLOBIN GLYCOSYLATED A1C: CPT

## 2022-03-01 PROCEDURE — 85007 BL SMEAR W/DIFF WBC COUNT: CPT

## 2022-03-01 PROCEDURE — 80061 LIPID PANEL: CPT

## 2022-03-01 PROCEDURE — 83540 ASSAY OF IRON: CPT

## 2022-03-01 PROCEDURE — 80053 COMPREHEN METABOLIC PANEL: CPT

## 2022-03-01 PROCEDURE — 84466 ASSAY OF TRANSFERRIN: CPT

## 2022-03-01 PROCEDURE — 82728 ASSAY OF FERRITIN: CPT

## 2022-03-01 PROCEDURE — 85060 BLOOD SMEAR INTERPRETATION: CPT

## 2022-03-01 PROCEDURE — 36415 COLL VENOUS BLD VENIPUNCTURE: CPT

## 2022-03-01 PROCEDURE — 85025 COMPLETE CBC W/AUTO DIFF WBC: CPT

## 2022-03-02 DIAGNOSIS — K21.9 GASTROESOPHAGEAL REFLUX DISEASE: ICD-10-CM

## 2022-03-02 RX ORDER — OMEPRAZOLE 20 MG/1
CAPSULE, DELAYED RELEASE ORAL
Qty: 30 CAPSULE | Refills: 5 | Status: SHIPPED | OUTPATIENT
Start: 2022-03-02 | End: 2022-09-02

## 2022-03-03 LAB
CYTOLOGIST CVX/VAG CYTO: NORMAL
EOSINOPHIL # BLD MANUAL: 0.07 10*3/MM3 (ref 0–0.4)
EOSINOPHIL NFR BLD MANUAL: 1 % (ref 0.3–6.2)
LYMPHOCYTES # BLD MANUAL: 4.25 10*3/MM3 (ref 0.7–3.1)
LYMPHOCYTES NFR BLD MANUAL: 2 % (ref 5–12)
MONOCYTES # BLD: 0.13 10*3/MM3 (ref 0.1–0.9)
NEUTROPHILS # BLD AUTO: 2.29 10*3/MM3 (ref 1.7–7)
NEUTROPHILS NFR BLD MANUAL: 34 % (ref 42.7–76)
PATH INTERP BLD-IMP: NORMAL
RBC MORPH BLD: NORMAL
SMALL PLATELETS BLD QL SMEAR: ADEQUATE
VARIANT LYMPHS NFR BLD MANUAL: 2 % (ref 0–5)
VARIANT LYMPHS NFR BLD MANUAL: 61 % (ref 19.6–45.3)
WBC MORPH BLD: NORMAL

## 2022-03-08 RX ORDER — DESVENLAFAXINE 100 MG/1
TABLET, EXTENDED RELEASE ORAL
Qty: 30 TABLET | Refills: 11 | Status: SHIPPED | OUTPATIENT
Start: 2022-03-08 | End: 2023-02-27

## 2022-03-08 NOTE — TELEPHONE ENCOUNTER
Rx Refill Note  Requested Prescriptions     Pending Prescriptions Disp Refills   • desvenlafaxine (PRISTIQ) 100 MG 24 hr tablet [Pharmacy Med Name: desvenlafaxine succinate  mg tablet,extended release 24 hr] 30 tablet 11     Sig: TAKE ONE TABLET BY MOUTH EVERY DAY      Last office visit with prescribing clinician: 1/24/2022      Next office visit with prescribing clinician: 7/29/2022            Rosy Amaya RN  03/08/22, 10:29 EST

## 2022-03-31 ENCOUNTER — HOSPITAL ENCOUNTER (OUTPATIENT)
Dept: INFUSION THERAPY | Facility: HOSPITAL | Age: 37
Setting detail: INFUSION SERIES
Discharge: HOME | End: 2022-03-31

## 2022-03-31 VITALS — HEIGHT: 62 IN | WEIGHT: 193 LBS | BODY MASS INDEX: 35.51 KG/M2

## 2022-03-31 DIAGNOSIS — E83.110 HEREDITARY HEMOCHROMATOSIS: Primary | ICD-10-CM

## 2022-03-31 LAB
BASOPHILS # BLD AUTO: 0.05 10*3/MM3 (ref 0–0.2)
BASOPHILS NFR BLD AUTO: 0.8 % (ref 0–1.5)
DEPRECATED RDW RBC AUTO: 38.4 FL (ref 37–54)
EOSINOPHIL # BLD AUTO: 0.2 10*3/MM3 (ref 0–0.4)
EOSINOPHIL NFR BLD AUTO: 3.3 % (ref 0.3–6.2)
ERYTHROCYTE [DISTWIDTH] IN BLOOD BY AUTOMATED COUNT: 11.9 % (ref 12.3–15.4)
FERRITIN SERPL-MCNC: 33.55 NG/ML (ref 13–150)
HCT VFR BLD AUTO: 43.3 % (ref 34–46.6)
HGB BLD-MCNC: 15.4 G/DL (ref 12–15.9)
IMM GRANULOCYTES # BLD AUTO: 0.01 10*3/MM3 (ref 0–0.05)
IMM GRANULOCYTES NFR BLD AUTO: 0.2 % (ref 0–0.5)
LYMPHOCYTES # BLD AUTO: 2.95 10*3/MM3 (ref 0.7–3.1)
LYMPHOCYTES NFR BLD AUTO: 49.2 % (ref 19.6–45.3)
MCH RBC QN AUTO: 31 PG (ref 26.6–33)
MCHC RBC AUTO-ENTMCNC: 35.6 G/DL (ref 31.5–35.7)
MCV RBC AUTO: 87.1 FL (ref 79–97)
MONOCYTES # BLD AUTO: 0.47 10*3/MM3 (ref 0.1–0.9)
MONOCYTES NFR BLD AUTO: 7.8 % (ref 5–12)
NEUTROPHILS NFR BLD AUTO: 2.31 10*3/MM3 (ref 1.7–7)
NEUTROPHILS NFR BLD AUTO: 38.7 % (ref 42.7–76)
NRBC BLD AUTO-RTO: 0 /100 WBC (ref 0–0.2)
PLATELET # BLD AUTO: 195 10*3/MM3 (ref 140–450)
PMV BLD AUTO: 10.1 FL (ref 6–12)
RBC # BLD AUTO: 4.97 10*6/MM3 (ref 3.77–5.28)
WBC NRBC COR # BLD: 5.99 10*3/MM3 (ref 3.4–10.8)

## 2022-03-31 PROCEDURE — G0463 HOSPITAL OUTPT CLINIC VISIT: HCPCS

## 2022-03-31 PROCEDURE — 85025 COMPLETE CBC W/AUTO DIFF WBC: CPT | Performed by: NURSE PRACTITIONER

## 2022-03-31 PROCEDURE — 36415 COLL VENOUS BLD VENIPUNCTURE: CPT

## 2022-03-31 PROCEDURE — 82728 ASSAY OF FERRITIN: CPT | Performed by: NURSE PRACTITIONER

## 2022-04-12 ENCOUNTER — OFFICE VISIT (OUTPATIENT)
Dept: INTERNAL MEDICINE | Facility: CLINIC | Age: 37
End: 2022-04-12

## 2022-04-12 VITALS
WEIGHT: 196 LBS | BODY MASS INDEX: 36.07 KG/M2 | DIASTOLIC BLOOD PRESSURE: 74 MMHG | HEIGHT: 62 IN | SYSTOLIC BLOOD PRESSURE: 104 MMHG | TEMPERATURE: 97.7 F | HEART RATE: 84 BPM

## 2022-04-12 DIAGNOSIS — T25.321D FULL THICKNESS BURN OF RIGHT FOOT, SUBSEQUENT ENCOUNTER: Primary | ICD-10-CM

## 2022-04-12 DIAGNOSIS — E66.01 MORBID (SEVERE) OBESITY DUE TO EXCESS CALORIES: ICD-10-CM

## 2022-04-12 PROBLEM — J12.82 PNEUMONIA DUE TO COVID-19 VIRUS: Status: RESOLVED | Noted: 2021-12-01 | Resolved: 2022-04-12

## 2022-04-12 PROBLEM — U07.1 PNEUMONIA DUE TO COVID-19 VIRUS: Status: RESOLVED | Noted: 2021-12-01 | Resolved: 2022-04-12

## 2022-04-12 PROCEDURE — 99213 OFFICE O/P EST LOW 20 MIN: CPT | Performed by: INTERNAL MEDICINE

## 2022-04-12 RX ORDER — PHENTERMINE HYDROCHLORIDE 37.5 MG/1
37.5 CAPSULE ORAL EVERY MORNING
Qty: 30 CAPSULE | Refills: 2 | Status: SHIPPED | OUTPATIENT
Start: 2022-04-12 | End: 2022-07-28
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
## 2022-04-14 DIAGNOSIS — T25.322D FULL THICKNESS BURN OF LEFT FOOT, SUBSEQUENT ENCOUNTER: Primary | ICD-10-CM

## 2022-04-22 RX ORDER — BISOPROLOL FUMARATE 10 MG/1
TABLET, FILM COATED ORAL
Qty: 30 TABLET | Refills: 3 | Status: SHIPPED | OUTPATIENT
Start: 2022-04-22 | End: 2022-08-24 | Stop reason: SDUPTHER

## 2022-04-23 DIAGNOSIS — E66.01 MORBID (SEVERE) OBESITY DUE TO EXCESS CALORIES: ICD-10-CM

## 2022-04-25 RX ORDER — PHENTERMINE HYDROCHLORIDE 37.5 MG/1
CAPSULE ORAL
Qty: 30 CAPSULE | Refills: 2 | OUTPATIENT
Start: 2022-04-25

## 2022-04-27 ENCOUNTER — OFFICE VISIT (OUTPATIENT)
Dept: INTERNAL MEDICINE | Facility: CLINIC | Age: 37
End: 2022-04-27

## 2022-04-27 VITALS
DIASTOLIC BLOOD PRESSURE: 64 MMHG | TEMPERATURE: 98 F | SYSTOLIC BLOOD PRESSURE: 96 MMHG | HEART RATE: 80 BPM | WEIGHT: 194.4 LBS | HEIGHT: 62 IN | BODY MASS INDEX: 35.77 KG/M2

## 2022-04-27 DIAGNOSIS — T25.321D FULL THICKNESS BURN OF RIGHT FOOT, SUBSEQUENT ENCOUNTER: Primary | ICD-10-CM

## 2022-04-27 PROCEDURE — 99213 OFFICE O/P EST LOW 20 MIN: CPT | Performed by: INTERNAL MEDICINE
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
## 2022-04-28 ENCOUNTER — INFUSION (OUTPATIENT)
Dept: ONCOLOGY | Facility: HOSPITAL | Age: 37
End: 2022-04-28

## 2022-04-28 VITALS
SYSTOLIC BLOOD PRESSURE: 111 MMHG | OXYGEN SATURATION: 99 % | TEMPERATURE: 98 F | RESPIRATION RATE: 14 BRPM | HEART RATE: 89 BPM | DIASTOLIC BLOOD PRESSURE: 72 MMHG

## 2022-04-28 DIAGNOSIS — E83.110 HEREDITARY HEMOCHROMATOSIS: Primary | ICD-10-CM

## 2022-04-28 LAB
BASOPHILS # BLD AUTO: 0.06 10*3/MM3 (ref 0–0.2)
BASOPHILS NFR BLD AUTO: 1 % (ref 0–1.5)
DEPRECATED RDW RBC AUTO: 37.8 FL (ref 37–54)
EOSINOPHIL # BLD AUTO: 0.12 10*3/MM3 (ref 0–0.4)
EOSINOPHIL NFR BLD AUTO: 1.9 % (ref 0.3–6.2)
ERYTHROCYTE [DISTWIDTH] IN BLOOD BY AUTOMATED COUNT: 11.9 % (ref 12.3–15.4)
FERRITIN SERPL-MCNC: 34.53 NG/ML (ref 13–150)
HCT VFR BLD AUTO: 43.3 % (ref 34–46.6)
HGB BLD-MCNC: 15 G/DL (ref 12–15.9)
IMM GRANULOCYTES # BLD AUTO: 0.01 10*3/MM3 (ref 0–0.05)
IMM GRANULOCYTES NFR BLD AUTO: 0.2 % (ref 0–0.5)
LYMPHOCYTES # BLD AUTO: 3.14 10*3/MM3 (ref 0.7–3.1)
LYMPHOCYTES NFR BLD AUTO: 50.6 % (ref 19.6–45.3)
MCH RBC QN AUTO: 30.1 PG (ref 26.6–33)
MCHC RBC AUTO-ENTMCNC: 34.6 G/DL (ref 31.5–35.7)
MCV RBC AUTO: 86.9 FL (ref 79–97)
MONOCYTES # BLD AUTO: 0.47 10*3/MM3 (ref 0.1–0.9)
MONOCYTES NFR BLD AUTO: 7.6 % (ref 5–12)
NEUTROPHILS NFR BLD AUTO: 2.41 10*3/MM3 (ref 1.7–7)
NEUTROPHILS NFR BLD AUTO: 38.7 % (ref 42.7–76)
NRBC BLD AUTO-RTO: 0 /100 WBC (ref 0–0.2)
PLATELET # BLD AUTO: 187 10*3/MM3 (ref 140–450)
PMV BLD AUTO: 9.9 FL (ref 6–12)
RBC # BLD AUTO: 4.98 10*6/MM3 (ref 3.77–5.28)
WBC NRBC COR # BLD: 6.21 10*3/MM3 (ref 3.4–10.8)

## 2022-04-28 PROCEDURE — 36415 COLL VENOUS BLD VENIPUNCTURE: CPT

## 2022-04-28 PROCEDURE — 82728 ASSAY OF FERRITIN: CPT

## 2022-04-28 PROCEDURE — 85025 COMPLETE CBC W/AUTO DIFF WBC: CPT

## 2022-04-28 PROCEDURE — G0463 HOSPITAL OUTPT CLINIC VISIT: HCPCS

## 2022-07-08 ENCOUNTER — LAB (OUTPATIENT)
Dept: LAB | Facility: HOSPITAL | Age: 37
End: 2022-07-08

## 2022-07-08 ENCOUNTER — HOSPITAL ENCOUNTER (OUTPATIENT)
Dept: MRI IMAGING | Facility: HOSPITAL | Age: 37
Discharge: HOME | End: 2022-07-08

## 2022-07-08 DIAGNOSIS — E83.110 HEREDITARY HEMOCHROMATOSIS: ICD-10-CM

## 2022-07-08 LAB
BASOPHILS # BLD AUTO: 0.05 10*3/MM3 (ref 0–0.2)
BASOPHILS NFR BLD AUTO: 0.6 % (ref 0–1.5)
DEPRECATED RDW RBC AUTO: 41.2 FL (ref 37–54)
EOSINOPHIL # BLD AUTO: 0.19 10*3/MM3 (ref 0–0.4)
EOSINOPHIL NFR BLD AUTO: 2.4 % (ref 0.3–6.2)
ERYTHROCYTE [DISTWIDTH] IN BLOOD BY AUTOMATED COUNT: 12.8 % (ref 12.3–15.4)
FERRITIN SERPL-MCNC: 35.91 NG/ML (ref 13–150)
HCT VFR BLD AUTO: 42.2 % (ref 34–46.6)
HGB BLD-MCNC: 14.6 G/DL (ref 12–15.9)
IMM GRANULOCYTES # BLD AUTO: 0.02 10*3/MM3 (ref 0–0.05)
IMM GRANULOCYTES NFR BLD AUTO: 0.3 % (ref 0–0.5)
LYMPHOCYTES # BLD AUTO: 3.03 10*3/MM3 (ref 0.7–3.1)
LYMPHOCYTES NFR BLD AUTO: 38.3 % (ref 19.6–45.3)
MCH RBC QN AUTO: 30.5 PG (ref 26.6–33)
MCHC RBC AUTO-ENTMCNC: 34.6 G/DL (ref 31.5–35.7)
MCV RBC AUTO: 88.1 FL (ref 79–97)
MONOCYTES # BLD AUTO: 0.59 10*3/MM3 (ref 0.1–0.9)
MONOCYTES NFR BLD AUTO: 7.5 % (ref 5–12)
NEUTROPHILS NFR BLD AUTO: 4.03 10*3/MM3 (ref 1.7–7)
NEUTROPHILS NFR BLD AUTO: 50.9 % (ref 42.7–76)
NRBC BLD AUTO-RTO: 0 /100 WBC (ref 0–0.2)
PLATELET # BLD AUTO: 209 10*3/MM3 (ref 140–450)
PMV BLD AUTO: 10.1 FL (ref 6–12)
RBC # BLD AUTO: 4.79 10*6/MM3 (ref 3.77–5.28)
WBC NRBC COR # BLD: 7.91 10*3/MM3 (ref 3.4–10.8)

## 2022-07-08 PROCEDURE — 82728 ASSAY OF FERRITIN: CPT

## 2022-07-08 PROCEDURE — 74181 MRI ABDOMEN W/O CONTRAST: CPT

## 2022-07-08 PROCEDURE — 85025 COMPLETE CBC W/AUTO DIFF WBC: CPT

## 2022-07-12 ENCOUNTER — OFFICE VISIT (OUTPATIENT)
Dept: ONCOLOGY | Facility: CLINIC | Age: 37
End: 2022-07-12

## 2022-07-12 VITALS
TEMPERATURE: 97.7 F | HEIGHT: 62 IN | DIASTOLIC BLOOD PRESSURE: 65 MMHG | WEIGHT: 189 LBS | OXYGEN SATURATION: 97 % | RESPIRATION RATE: 16 BRPM | HEART RATE: 86 BPM | BODY MASS INDEX: 34.78 KG/M2 | SYSTOLIC BLOOD PRESSURE: 100 MMHG

## 2022-07-12 DIAGNOSIS — E83.110 HEREDITARY HEMOCHROMATOSIS: Primary | ICD-10-CM

## 2022-07-12 PROCEDURE — 99214 OFFICE O/P EST MOD 30 MIN: CPT | Performed by: NURSE PRACTITIONER

## 2022-07-12 NOTE — PROGRESS NOTES
"CHIEF COMPLAINT: Management of hemochromatosis.    Problem List:  Oncology/Hematology History Overview Note   1. Compound heterozygous C282y and H63D hemochromatosis:   a) Baseline MRI of the abdomen, 05/18/2016 revealed moderate iron deposition and overload, estimated at 270 umol per gram by [facility] protocol.  No other significant upper abdominal pathology. Liver otherwise appears unremarkable.   2. History of hysterectomy.   3. History of neurocardiogenic syncope status post cardiac ablation by Dr. Gentile.   4.  Passage of kidney stone with retrieval July 2016  5.  Diastolic hypertension  6.  Degenerative arthritis felt to be related to hemochromatosis according to pathology     Hemochromatosis   5/1/2007 Initial Diagnosis    Hemochromatosis     9/27/2017 Imaging    MRI liver iron quantitation  Impression:     Moderate-to-severe iron deposition in the liver measuring  280 umol per gram with 300 umol per gram defining \"major iron overload\".  In May 2016 the measurement was 270 umol per gram.             10/17/2017 Imaging    CT of the abdomen and pelvis with contrast: Changes of mild hepato-steatosis.  Rim-enhancing lesion within the right ovary possibly representing a resolving cyst.     3/24/2022 -  Chemotherapy    OP THERAPEUTIC PHLEBOTOMY         HISTORY OF PRESENT ILLNESS:  The patient is a 36 y.o. female, here for follow up on management of hemochromatosis.  She had recent labs.  Migraines are stable. No recent rashes. She has lost 20lbs deliberately. Her goal is another 20-30.  She is exercising and staying active.       Past Medical History:   Diagnosis Date   • Abnormal uterine bleeding (AUB)    • Atrial fibrillation (HCC)    • Calcium deposits of brain     on MRI   • Cardiac abnormality    • Depression 12/16/2016   • Dysplasia of cervix    • Fibromyalgia 12/16/2016    probable   • Gestational diabetes     Class A1-Diet Controlled   • Hemochromatosis    • Hypertension 12/16/2016   • " "Inappropriate sinus tachycardia 12/16/2016    Electrophysiology study with radiofrequency ablation, June 2011 at [facility] in [place], [place], no data.   Echocardiogram February 2016: EF 60% to 65%, impaired relaxation noted. Trace MR, trace TR.    • Kidney stone    • Migraines    • Monospot test positive     History of positive Monospot.     • Nephrolithiasis    • Neurocardiogenic syncope     with tachycardia. Pt reports she had cardiac ablation in 2011.   • Pneumonia due to COVID-19 virus 12/1/2021   • Steatohepatitis      Past Surgical History:   Procedure Laterality Date   • CARDIAC ABLATION  2011    bypass tract ablation   • CERVICAL CONIZATION, LEEP  2005    MODERATE DYSPLASIA   • CYSTOSCOPY URETEROSCOPY Left 7/10/2016    Procedure: CYSTOSCOPY, LEFT URETEROSCOPY, STONE EXTRACTION, LEFT URETERAL STENT INSERTION UNDER FLUROSCOPY;  Surgeon: Bernardo Simental MD;  Location: [facility];  Service:    • DILATATION AND CURETTAGE  2010   • TOTAL LAPAROSCOPIC HYSTERECTOMY Bilateral 08/19/2013    SALPINGECTOMY       No Known Allergies    Family History and Social History reviewed and changed as necessary      REVIEW OF SYSTEM:   Review of Systems   All other systems reviewed and are negative.         PHYSICAL EXAM    Vitals:    07/12/22 1003   BP: 100/65   Pulse: 86   Resp: 16   Temp: 97.7 °F (36.5 °C)   TempSrc: Temporal   SpO2: 97%   Weight: 85.7 kg (189 lb)   Height: 157.5 cm (62\")     General: well appearing female in no acute distress  Neuro: alert and oriented  HEENT: sclera anicteric, oropharynx clear  Lymphatics: no cervical, supraclavicular, or axillary adenopathy  Cardiovascular: regular rate and rhythm, no murmurs  Lungs: clear to auscultation bilaterally  Abdomen: soft, nontender, nondistended.  No palpable organomegaly  Extremeties: no lower extremity edema  Skin: no rashes, lesions, bruising, or petechiae  Psych: mood and affect appropriate  Vitals reviewed.      MRI Abdomen Without " Contrast    Result Date: 7/8/2022  Left renal cyst. Otherwise unremarkable MRI of the abdomen without contrast. No significant iron deposition within the liver based on in-and-out of phase imaging.  This report was signed and finalized on 7/8/2022 3:39 PM by Meghan Cleveland MD.         ASSESSMENT & PLAN:    1.  Compound heterozygous hemochromatosis  We had to restart phlebotomy about 6 months ago due to ferritin over 78.  Ferritin has improved to below 50.  She has not needed phlebotomy for approximately 3 months.  We will plan to spread out her phlebotomy to once every 6 months at this point.  We reviewed her goal is for ferritin less than 50.  Normally, in the low 100 is her target.  However, rheumatology believes her hemochromatosis is likely the source of her joint aches.    We will see her back in 6 months with blood counts and iron panel and CMP prior to return.      2. Vasovagal syncope. Stable    3. Diastolic hypertension. Improved.     4. Malar rash with arthritis. Stable. She will continue to follow up with Rheumatology    5.    Obesity.  I again encouraged the patient to continue to exercise and continue watching her caloric intake.      Follow up in 6 months with phlebotomy        Flaquita Anderson, VERITO  07/12/22

## 2022-07-28 DIAGNOSIS — E66.01 MORBID (SEVERE) OBESITY DUE TO EXCESS CALORIES: ICD-10-CM

## 2022-07-28 RX ORDER — PHENTERMINE HYDROCHLORIDE 37.5 MG/1
CAPSULE ORAL
Qty: 30 CAPSULE | Refills: 2 | Status: SHIPPED | OUTPATIENT
Start: 2022-07-28 | End: 2022-09-09

## 2022-08-15 ENCOUNTER — TELEPHONE (OUTPATIENT)
Dept: OBSTETRICS AND GYNECOLOGY | Facility: CLINIC | Age: 37
End: 2022-08-15

## 2022-08-15 NOTE — TELEPHONE ENCOUNTER
----- Message from VERITO Wilson sent at 8/15/2022  8:00 AM EDT -----  Bijal,  Can you please call her and get her scheduled with one of the physicians to discuss vulvar skin tag removal?  She would like first available appt but needs to check her work calendar first.     Thanks!

## 2022-08-17 RX ORDER — BISOPROLOL FUMARATE 10 MG/1
TABLET, FILM COATED ORAL
Qty: 30 TABLET | Refills: 3 | OUTPATIENT
Start: 2022-08-17

## 2022-08-17 RX ORDER — BUSPIRONE HYDROCHLORIDE 15 MG/1
TABLET ORAL
Qty: 60 TABLET | Refills: 5 | Status: SHIPPED | OUTPATIENT
Start: 2022-08-17 | End: 2022-10-12 | Stop reason: SDUPTHER

## 2022-08-17 NOTE — TELEPHONE ENCOUNTER
Rx Refill Note  Requested Prescriptions     Pending Prescriptions Disp Refills   • busPIRone (BUSPAR) 15 MG tablet [Pharmacy Med Name: buspirone 15 mg tablet] 60 tablet 5     Sig: TAKE ONE TABLET BY MOUTH TWICE DAILY      Last office visit with prescribing clinician: 4/27/2022      Next office visit with prescribing clinician: 9/1/2022            Dominique Nichols LPN  08/17/22, 08:42 EDT

## 2022-08-24 RX ORDER — BISOPROLOL FUMARATE 10 MG/1
10 TABLET, FILM COATED ORAL DAILY
Qty: 30 TABLET | Refills: 0 | Status: SHIPPED | OUTPATIENT
Start: 2022-08-24 | End: 2022-09-26 | Stop reason: SDUPTHER

## 2022-08-24 RX ORDER — BISOPROLOL FUMARATE 10 MG/1
TABLET, FILM COATED ORAL
Qty: 30 TABLET | Refills: 3 | OUTPATIENT
Start: 2022-08-24

## 2022-09-02 DIAGNOSIS — K21.9 GASTROESOPHAGEAL REFLUX DISEASE: ICD-10-CM

## 2022-09-02 RX ORDER — OMEPRAZOLE 20 MG/1
CAPSULE, DELAYED RELEASE ORAL
Qty: 30 CAPSULE | Refills: 5 | Status: SHIPPED | OUTPATIENT
Start: 2022-09-02 | End: 2023-03-06

## 2022-09-08 NOTE — PROGRESS NOTES
Patient: Cristina Haines    YOB: 1985    Date: 09/09/2022    Primary Care Provider: Rahat Mercedes MD    Chief Complaint   Patient presents with   • Hemorrhoids       SUBJECTIVE:    History of present illness:  I saw the patient in the office today for the evaluation and treatment for hemorrhoids.  Patient states that she had hemorrhoids previously that had been surgically removed in 2019. Patient states that the hemorrhoids had returned in 2020 approximately. Patient states that she does have some constipation. Patient had colonoscopy in 2019.  Patient with rectal pain and prolapsed hemorrhoids daily.  Occasional rectal bleeding.    The following portions of the patient's history were reviewed and updated as appropriate: allergies, current medications, past family history, past medical history, past social history, past surgical history and problem list.      Review of Systems   Constitutional: Negative for chills, fever and unexpected weight change.   HENT: Negative for hearing loss, trouble swallowing and voice change.    Eyes: Negative for visual disturbance.   Respiratory: Negative for apnea, cough, chest tightness, shortness of breath and wheezing.    Cardiovascular: Negative for chest pain, palpitations and leg swelling.   Gastrointestinal: Positive for rectal pain. Negative for abdominal distention, abdominal pain, anal bleeding, blood in stool, constipation, diarrhea, nausea and vomiting.   Endocrine: Negative for cold intolerance and heat intolerance.   Genitourinary: Negative for difficulty urinating, dysuria and flank pain.   Musculoskeletal: Negative for back pain and gait problem.   Skin: Negative for color change, rash and wound.   Neurological: Negative for dizziness, syncope, speech difficulty, weakness, light-headedness, numbness and headaches.   Hematological: Negative for adenopathy. Does not bruise/bleed easily.   Psychiatric/Behavioral: Negative for confusion. The  patient is not nervous/anxious.        Allergies:  No Known Allergies    Medications:    Current Outpatient Medications:   •  bisoprolol (ZEBeta) 10 MG tablet, Take 1 tablet by mouth Daily., Disp: 30 tablet, Rfl: 0  •  busPIRone (BUSPAR) 15 MG tablet, TAKE ONE TABLET BY MOUTH TWICE DAILY, Disp: 60 tablet, Rfl: 5  •  cetirizine (zyrTEC) 10 MG tablet, Take 10 mg by mouth Daily., Disp: , Rfl:   •  cholecalciferol (VITAMIN D3) 25 MCG (1000 UT) tablet, Take 1,000 Units by mouth Daily., Disp: , Rfl:   •  desvenlafaxine (PRISTIQ) 100 MG 24 hr tablet, TAKE ONE TABLET BY MOUTH EVERY DAY, Disp: 30 tablet, Rfl: 11  •  Melatonin 10 MG tablet, Take 1 tablet by mouth At Night As Needed., Disp: , Rfl:   •  Multiple Vitamin (MULTI-VITAMIN DAILY PO), Take 1 tablet by mouth Daily As Needed., Disp: , Rfl:   •  omeprazole (priLOSEC) 20 MG capsule, TAKE ONE CAPSULE BY MOUTH EVERY DAY, Disp: 30 capsule, Rfl: 5    History:  Past Medical History:   Diagnosis Date   • Abnormal uterine bleeding (AUB)    • Atrial fibrillation (HCC)    • Calcium deposits of brain     on MRI   • Cardiac abnormality    • Depression 12/16/2016   • Dysplasia of cervix    • Fibromyalgia 12/16/2016    probable   • Gestational diabetes     Class A1-Diet Controlled   • Hemochromatosis    • Hypertension 12/16/2016   • Inappropriate sinus tachycardia 12/16/2016    Electrophysiology study with radiofrequency ablation, June 2011 at [facility] in [place], [place], no data.   Echocardiogram February 2016: EF 60% to 65%, impaired relaxation noted. Trace MR, trace TR.    • Kidney stone    • Migraines    • Monospot test positive     History of positive Monospot.     • Nephrolithiasis    • Neurocardiogenic syncope     with tachycardia. Pt reports she had cardiac ablation in 2011.   • Pneumonia due to COVID-19 virus 12/1/2021   • Steatohepatitis        Past Surgical History:   Procedure Laterality Date   • CARDIAC ABLATION  2011    bypass tract ablation   • CERVICAL  "CONIZATION, LEEP  2005    MODERATE DYSPLASIA   • CYSTOSCOPY URETEROSCOPY Left 7/10/2016    Procedure: CYSTOSCOPY, LEFT URETEROSCOPY, STONE EXTRACTION, LEFT URETERAL STENT INSERTION UNDER FLUROSCOPY;  Surgeon: Bernardo Simental MD;  Location: [facility];  Service:    • DILATATION AND CURETTAGE  2010   • TOTAL LAPAROSCOPIC HYSTERECTOMY Bilateral 08/19/2013    SALPINGECTOMY       Family History   Problem Relation Age of Onset   • Colon cancer Paternal Grandmother    • Heart disease Mother    • Depression Mother    • Migraines Mother    • Hypertension Father    • Diabetes Father    • Hemochromatosis Father    • Hyperlipidemia Father    • Bradycardia Brother        Social History     Tobacco Use   • Smoking status: Never Smoker   • Smokeless tobacco: Never Used   Vaping Use   • Vaping Use: Never used   Substance Use Topics   • Alcohol use: No   • Drug use: No        OBJECTIVE:    Vital Signs:   Vitals:    09/09/22 1425   BP: 104/76   BP Location: Right arm   Patient Position: Sitting   Cuff Size: Adult   Pulse: 91   Temp: 98.3 °F (36.8 °C)   TempSrc: Temporal   SpO2: 98%   Height: 157.5 cm (62\")       Physical Exam:   General Appearance:    Alert, cooperative, in no acute distress   Head:    Normocephalic, without obvious abnormality, atraumatic   Eyes:            Lids and lashes normal, conjunctivae and sclerae normal, no   icterus, no pallor, corneas clear, PERRLA   Ears:    Ears appear intact with no abnormalities noted   Throat:   No oral lesions, no thrush, oral mucosa moist   Neck:   No adenopathy, supple, trachea midline, no thyromegaly, no   carotid bruit, no JVD   Lungs:     Clear to auscultation,respirations regular, even and                  unlabored    Heart:    Regular rhythm and normal rate, normal S1 and S2, no            murmur, no gallop, no rub, no click   Chest Wall:    No abnormalities observed   Abdomen:     Normal bowel sounds, no masses, no organomegaly, soft        non-tender, non-distended, no " guarding, no rebound                Tenderness  Rectal- external hemorrhoid tags, internal hemorrhoids present.  No stigmata of bleeding.   Extremities:   Moves all extremities well, no edema, no cyanosis, no             redness   Pulses:   Pulses palpable and equal bilaterally   Skin:   No bleeding, bruising or rash   Lymph nodes:   No palpable adenopathy   Neurologic:   Cranial nerves 2 - 12 grossly intact, sensation intact, DTR       present and equal bilaterally     Results Review:   I reviewed the patient's new clinical results.    Review of Systems was reviewed and confirmed as accurate as documented by the MA.    ASSESSMENT/PLAN:    1. Grade II hemorrhoids    2. Rectal bleed        Patient scheduled for hemorrhoid banding.  Risk of bleeding infection recurrence discussed and patient agreeable.  Patient also told to maintain a high-fiber diet and Metamucil daily to keep her bowel movements soft.  Patient agreeable wishes to proceed.  No further questions.    I discussed the patients findings and my recommendations with patient        Electronically signed by Arnaldo Spencer MD  09/09/22

## 2022-09-09 ENCOUNTER — OFFICE VISIT (OUTPATIENT)
Dept: SURGERY | Facility: CLINIC | Age: 37
End: 2022-09-09

## 2022-09-09 VITALS
HEART RATE: 91 BPM | TEMPERATURE: 98.3 F | BODY MASS INDEX: 34.57 KG/M2 | OXYGEN SATURATION: 98 % | HEIGHT: 62 IN | DIASTOLIC BLOOD PRESSURE: 76 MMHG | SYSTOLIC BLOOD PRESSURE: 104 MMHG

## 2022-09-09 DIAGNOSIS — K62.5 RECTAL BLEED: ICD-10-CM

## 2022-09-09 DIAGNOSIS — K64.1 GRADE II HEMORRHOIDS: Primary | ICD-10-CM

## 2022-09-09 PROCEDURE — 99203 OFFICE O/P NEW LOW 30 MIN: CPT | Performed by: SURGERY

## 2022-09-09 RX ORDER — SEMAGLUTIDE 1.34 MG/ML
0.25 INJECTION, SOLUTION SUBCUTANEOUS WEEKLY
Qty: 2 ML | Refills: 2 | Status: SHIPPED | OUTPATIENT
Start: 2022-09-09 | End: 2022-11-08

## 2022-09-15 ENCOUNTER — PRIOR AUTHORIZATION (OUTPATIENT)
Dept: INTERNAL MEDICINE | Facility: CLINIC | Age: 37
End: 2022-09-15

## 2022-09-22 ENCOUNTER — TELEPHONE (OUTPATIENT)
Dept: SURGERY | Facility: CLINIC | Age: 37
End: 2022-09-22

## 2022-09-23 RX ORDER — BISOPROLOL FUMARATE 10 MG/1
TABLET, FILM COATED ORAL
Qty: 30 TABLET | Refills: 0 | OUTPATIENT
Start: 2022-09-23

## 2022-09-24 ENCOUNTER — PATIENT MESSAGE (OUTPATIENT)
Dept: INTERNAL MEDICINE | Facility: CLINIC | Age: 37
End: 2022-09-24

## 2022-09-24 PROBLEM — Z01.419 WELL WOMAN EXAM: Status: ACTIVE | Noted: 2022-09-24

## 2022-09-26 RX ORDER — BISOPROLOL FUMARATE 10 MG/1
10 TABLET, FILM COATED ORAL DAILY
Qty: 30 TABLET | Refills: 5 | Status: SHIPPED | OUTPATIENT
Start: 2022-09-26 | End: 2023-03-22

## 2022-09-26 NOTE — TELEPHONE ENCOUNTER
From: Cristina Haines  To: Rahat Mercedes MD  Sent: 9/24/2022 9:30 PM EDT  Subject: Refill Request- Bisoprolol 10 mg     Dr. Mercedes,    Can you send in a refill request on my Bisoprolol 10 mg? My pharmacy requested one and it was denied. They gave me three for the time being.    Thank you

## 2022-09-26 NOTE — TELEPHONE ENCOUNTER
Rx Refill Note  Requested Prescriptions     Pending Prescriptions Disp Refills   • bisoprolol (ZEBeta) 10 MG tablet 30 tablet 5     Sig: Take 1 tablet by mouth Daily.      Last office visit with prescribing clinician: 4/27/2022      Next office visit with prescribing clinician: 11/8/2022            Blanche Lee LPN  09/26/22, 08:54 EDT

## 2022-09-27 ENCOUNTER — TELEPHONE (OUTPATIENT)
Dept: SURGERY | Facility: CLINIC | Age: 37
End: 2022-09-27

## 2022-09-27 ENCOUNTER — OUTSIDE FACILITY SERVICE (OUTPATIENT)
Dept: SURGERY | Facility: CLINIC | Age: 37
End: 2022-09-27

## 2022-09-27 DIAGNOSIS — K64.1 GRADE II HEMORRHOIDS: Primary | ICD-10-CM

## 2022-09-27 PROCEDURE — 46221 LIGATION OF HEMORRHOID(S): CPT | Performed by: SURGERY

## 2022-09-27 RX ORDER — HYDROCODONE BITARTRATE AND ACETAMINOPHEN 7.5; 325 MG/1; MG/1
1 TABLET ORAL EVERY 6 HOURS PRN
Qty: 15 TABLET | Refills: 0 | Status: SHIPPED | OUTPATIENT
Start: 2022-09-27 | End: 2022-11-08

## 2022-09-27 NOTE — TELEPHONE ENCOUNTER
Caller: TARA SIDHU    Relationship to patient: SELF     Best call back number: [phone]    Patient is needing: PT IS ANY SEVERE PAIN AND NEEDS SOME RELIEF. SHE WANTED TO KNOW IF  COULD CALL HER SOMETHING IN TO THE [facility] PHARMACY ON FILE. SHE HAD SURGERY TODAY.

## 2022-09-30 DIAGNOSIS — E66.01 MORBID OBESITY: Primary | ICD-10-CM

## 2022-09-30 RX ORDER — PHENTERMINE HYDROCHLORIDE 37.5 MG/1
37.5 CAPSULE ORAL EVERY MORNING
Qty: 30 CAPSULE | Refills: 2 | Status: SHIPPED | OUTPATIENT
Start: 2022-09-30 | End: 2023-01-16 | Stop reason: SDUPTHER

## 2022-10-03 NOTE — PROGRESS NOTES
"Patient: Cristina Haines    YOB: 1985    Date: 10/05/2022    Primary Care Provider: Rahat Mercedes MD    Chief Complaint   Patient presents with   • Post-op     Hemorrhoid banding       History of present illness:  I saw the patient in the office today as a followup from their recent hemorrhoid banding. They state that they have done well and are having no problems.  Patient still has external hemorrhoid, 1 episode of bleeding.  Otherwise doing much better.    Vital Signs:  Vitals:    10/05/22 0829   BP: 118/68   Pulse: 78   Resp: 18   Temp: 97.8 °F (36.6 °C)   TempSrc: Temporal   SpO2: 100%   Height: 157.5 cm (62\")       Physical Exam:   General Appearance:    Alert, cooperative, in no acute distress, wound clean dry without infection   Abdomen:     no masses, no organomegaly, soft non-tender, non-distended, no guarding, wounds are well healed   Chest:      Clear to ausculation  Rectal-external hemorrhoid present, no inflammation or bleeding.       Assessment / Plan:    1. Rectal bleed    2. Grade II hemorrhoids        I did discuss the situation with the patient today in the office and they have done well from their recent lesion excision, I don't think that the patient needs any further intervention and I need to see them back only if they have further problems. Pathology report was reviewed with the patient in the office.  Patient reassured that external hemorrhoids cannot be banded, will need to be excised.  At this time she would like to hold off on excision will follow-up as needed.    Electronically signed by Arnaldo Spencer MD  10/05/22                      "

## 2022-10-05 ENCOUNTER — OFFICE VISIT (OUTPATIENT)
Dept: SURGERY | Facility: CLINIC | Age: 37
End: 2022-10-05

## 2022-10-05 VITALS
TEMPERATURE: 97.8 F | BODY MASS INDEX: 34.57 KG/M2 | SYSTOLIC BLOOD PRESSURE: 118 MMHG | HEART RATE: 78 BPM | DIASTOLIC BLOOD PRESSURE: 68 MMHG | HEIGHT: 62 IN | RESPIRATION RATE: 18 BRPM | OXYGEN SATURATION: 100 %

## 2022-10-05 DIAGNOSIS — K62.5 RECTAL BLEED: Primary | ICD-10-CM

## 2022-10-05 DIAGNOSIS — K64.1 GRADE II HEMORRHOIDS: ICD-10-CM

## 2022-10-05 PROCEDURE — 99212 OFFICE O/P EST SF 10 MIN: CPT | Performed by: SURGERY

## 2022-10-12 RX ORDER — BUSPIRONE HYDROCHLORIDE 30 MG/1
30 TABLET ORAL 2 TIMES DAILY
Qty: 60 TABLET | Refills: 5 | Status: SHIPPED | OUTPATIENT
Start: 2022-10-12

## 2022-11-08 ENCOUNTER — OFFICE VISIT (OUTPATIENT)
Dept: INTERNAL MEDICINE | Facility: CLINIC | Age: 37
End: 2022-11-08

## 2022-11-08 VITALS
HEART RATE: 88 BPM | SYSTOLIC BLOOD PRESSURE: 102 MMHG | WEIGHT: 192 LBS | BODY MASS INDEX: 35.33 KG/M2 | TEMPERATURE: 98.2 F | DIASTOLIC BLOOD PRESSURE: 70 MMHG | HEIGHT: 62 IN

## 2022-11-08 DIAGNOSIS — I10 PRIMARY HYPERTENSION: Primary | ICD-10-CM

## 2022-11-08 DIAGNOSIS — R73.9 HYPERGLYCEMIA: ICD-10-CM

## 2022-11-08 DIAGNOSIS — F33.1 MODERATE RECURRENT MAJOR DEPRESSION: ICD-10-CM

## 2022-11-08 DIAGNOSIS — E78.5 DYSLIPIDEMIA: ICD-10-CM

## 2022-11-08 PROCEDURE — 99214 OFFICE O/P EST MOD 30 MIN: CPT | Performed by: INTERNAL MEDICINE

## 2022-11-08 NOTE — PROGRESS NOTES
"[facility]     Cristina Haines  1985   [id number]      Patient Care Team:  Rahat Mercedes MD as PCP - General  Rahat Mercedes MD as PCP - Family Medicine  Yonas Pavon MD as Consulting Physician (Hematology and Oncology)  Jared Spence MD as Consulting Physician (Gastroenterology)  Dashawn Gentile MD as Consulting Physician (Cardiology)  Arnaldo Spencer MD as Consulting Physician (General Surgery)    Chief Complaint::   Chief Complaint   Patient presents with   • Hypertension   • Nasal Congestion        HPI  The patient presents for follow-up of hypertension, dyslipidemia, hyperglycemia, and depression.    Anxiety  The patient states that her anxiety has been \" pretty rough \" lately. She has been under a lot of stress at work and her Dad's ex-wife's is a nightmare. She is currently taking BuSpar 7.5 mg twice daily and Pristiq. She states that the increase in BuSpar did seem to help her anxiety. She is also going to therapy every 2 weeks. She was diagnosed with post traumatic stress disorder and generalized anxiety from a terrible marriage. She states that she is doing better than she was at one time.    Hypertension  She denies any recent episodes of hypotension.    Health maintenance  She states that she recently had her influenza vaccine and is up to date on her COVID-19 vaccine.    Chronic Conditions:   Hypertension, dyslipidemia, hyperglycemia, and depression.    Patient Active Problem List   Diagnosis   • Urolithiasis   • Hyperglycemia   • Hemochromatosis   • Vasovagal syncope   • Inappropriate sinus tachycardia   • Primary hypertension   • History of migraine headaches   • Fibromyalgia   • Depression   • Tachycardia   • Atrial fibrillation (HCC)   • Chest pain   • Other disorders of iron metabolism   • External hemorrhoids   • Generalized abdominal pain   • Dyslipidemia   • Irritable bowel syndrome   • Moderate recurrent major depression (HCC)   • Morbid obesity " (HCC)   • Syncope   • Neck pain   • Vitamin D deficiency   • Non-intractable vomiting   • Annual GYN exam        Past Medical History:   Diagnosis Date   • Abnormal uterine bleeding (AUB)    • Atrial fibrillation (HCC)    • Calcium deposits of brain     on MRI   • Cardiac abnormality    • Depression 12/16/2016   • Dysplasia of cervix    • Fibromyalgia 12/16/2016    probable   • Gestational diabetes     Class A1-Diet Controlled   • Hemochromatosis    • Hypertension 12/16/2016   • Inappropriate sinus tachycardia 12/16/2016    Electrophysiology study with radiofrequency ablation, June 2011 at [facility] in [place], [place], no data.   Echocardiogram February 2016: EF 60% to 65%, impaired relaxation noted. Trace MR, trace TR.    • Kidney stone    • Migraines    • Monospot test positive     History of positive Monospot.     • Nephrolithiasis    • Neurocardiogenic syncope     with tachycardia. Pt reports she had cardiac ablation in 2011.   • Pneumonia due to COVID-19 virus 12/01/2021   • Steatohepatitis        Past Surgical History:   Procedure Laterality Date   • CARDIAC ABLATION  2011    bypass tract ablation   • CERVICAL CONIZATION, LEEP  2005    MODERATE DYSPLASIA   • CYSTOSCOPY URETEROSCOPY Left 7/10/2016    Procedure: CYSTOSCOPY, LEFT URETEROSCOPY, STONE EXTRACTION, LEFT URETERAL STENT INSERTION UNDER FLUROSCOPY;  Surgeon: Bernardo Simental MD;  Location: [facility];  Service:    • DILATATION AND CURETTAGE  2010   • TOTAL LAPAROSCOPIC HYSTERECTOMY Bilateral 08/19/2013    SALPINGECTOMY       Family History   Problem Relation Age of Onset   • Colon cancer Paternal Grandmother    • Heart disease Mother    • Depression Mother    • Migraines Mother    • Hypertension Father    • Diabetes Father    • Hemochromatosis Father    • Hyperlipidemia Father    • Bradycardia Brother        Social History     Socioeconomic History   • Marital status:    Tobacco Use   • Smoking status: Never   • Smokeless tobacco:  "Never   Vaping Use   • Vaping Use: Never used   Substance and Sexual Activity   • Alcohol use: No   • Drug use: No   • Sexual activity: Yes     Partners: Male     Birth control/protection: Surgical       No Known Allergies      Current Outpatient Medications:   •  bisoprolol (ZEBeta) 10 MG tablet, Take 1 tablet by mouth Daily., Disp: 30 tablet, Rfl: 5  •  busPIRone (BUSPAR) 30 MG tablet, Take 1 tablet by mouth 2 (Two) Times a Day., Disp: 60 tablet, Rfl: 5  •  cetirizine (zyrTEC) 10 MG tablet, Take 10 mg by mouth Daily., Disp: , Rfl:   •  cholecalciferol (VITAMIN D3) 25 MCG (1000 UT) tablet, Take 1,000 Units by mouth Daily., Disp: , Rfl:   •  desvenlafaxine (PRISTIQ) 100 MG 24 hr tablet, TAKE ONE TABLET BY MOUTH EVERY DAY, Disp: 30 tablet, Rfl: 11  •  Melatonin 10 MG tablet, Take 1 tablet by mouth At Night As Needed., Disp: , Rfl:   •  Multiple Vitamin (MULTI-VITAMIN DAILY PO), Take 1 tablet by mouth Daily As Needed., Disp: , Rfl:   •  omeprazole (priLOSEC) 20 MG capsule, TAKE ONE CAPSULE BY MOUTH EVERY DAY, Disp: 30 capsule, Rfl: 5  •  phentermine 37.5 MG capsule, Take 1 capsule by mouth Every Morning., Disp: 30 capsule, Rfl: 2    Review of Systems   Constitutional: Negative for activity change and fever.   Musculoskeletal: Positive for arthralgias.   Skin: Negative for color change and rash.   Neurological: Negative for weakness.        Vital Signs  Vitals:    11/08/22 1014   BP: 102/70   BP Location: Left arm   Patient Position: Sitting   Cuff Size: Adult   Pulse: 88   Temp: 98.2 °F (36.8 °C)   TempSrc: Temporal   Weight: 87.1 kg (192 lb)   Height: 157.5 cm (62.01\")   PainSc: 0-No pain       Physical Exam  Vitals and nursing note reviewed.   Constitutional:       General: She is not in acute distress.     Appearance: She is well-developed. She is not diaphoretic.      Comments: Overweight   HENT:      Head: Normocephalic and atraumatic.   Eyes:      Conjunctiva/sclera: Conjunctivae normal.   Pulmonary:      " Effort: Pulmonary effort is normal. No respiratory distress.   Skin:     General: Skin is warm.      Capillary Refill: Capillary refill takes less than 2 seconds.   Neurological:      Mental Status: She is alert.          Procedures    ACE III MINI             Assessment/Plan:    Diagnoses and all orders for this visit:    1. Primary hypertension (Primary)    2. Dyslipidemia  -     Comprehensive Metabolic Panel; Future  -     Lipid Panel; Future  -     Apolipoprotein B; Future  -     Lipoprotein A (LPA); Future    3. Hyperglycemia  -     Hemoglobin A1c; Future    4. Moderate recurrent major depression (HCC)    1. Hypertension  - Blood pressure is well controlled on bisoprolol. No recent episodes of hypotension.    2. Dyslipidemia  - Lipid panel is pending including apolipoprotein B.    3. Hyperglycemia  - Hemoglobin A1c is pending earlier this year it was normal at 5.1 percent.    4. Depression and anxiety  - The increase in buspirone did help with her anxiety. She will continue Pristiq as well. She continues to have anxiety, but I think much of this is from normal stress.      Plan of care reviewed with patient at the conclusion of today's visit. Education was provided regarding diagnosis, management, and any prescribed or recommended OTC medications.Patient verbalizes understanding of and agreement with management plan.         Transcribed from ambient dictation for Rahat Mercedes MD by Farzana Bradshaw.  11/08/22   12:02 EST    Patient or patient representative verbalized consent to the visit recording.  I have personally performed the services described in this document as transcribed by the above individual, and it is both accurate and complete.

## 2022-11-22 ENCOUNTER — LAB (OUTPATIENT)
Dept: LAB | Facility: HOSPITAL | Age: 37
End: 2022-11-22

## 2022-11-22 DIAGNOSIS — E78.5 DYSLIPIDEMIA: ICD-10-CM

## 2022-11-22 DIAGNOSIS — E83.110 HEREDITARY HEMOCHROMATOSIS: ICD-10-CM

## 2022-11-22 DIAGNOSIS — R73.9 HYPERGLYCEMIA: ICD-10-CM

## 2022-11-22 LAB
ALBUMIN SERPL-MCNC: 4.3 G/DL (ref 3.5–5.2)
ALBUMIN/GLOB SERPL: 1.9 G/DL
ALP SERPL-CCNC: 54 U/L (ref 39–117)
ALT SERPL W P-5'-P-CCNC: 16 U/L (ref 1–33)
ANION GAP SERPL CALCULATED.3IONS-SCNC: 10.4 MMOL/L (ref 5–15)
AST SERPL-CCNC: 15 U/L (ref 1–32)
BASOPHILS # BLD AUTO: 0.07 10*3/MM3 (ref 0–0.2)
BASOPHILS NFR BLD AUTO: 0.9 % (ref 0–1.5)
BILIRUB SERPL-MCNC: 0.2 MG/DL (ref 0–1.2)
BUN SERPL-MCNC: 16 MG/DL (ref 6–20)
BUN/CREAT SERPL: 19.5 (ref 7–25)
CALCIUM SPEC-SCNC: 9.7 MG/DL (ref 8.6–10.5)
CHLORIDE SERPL-SCNC: 104 MMOL/L (ref 98–107)
CHOLEST SERPL-MCNC: 213 MG/DL (ref 0–200)
CO2 SERPL-SCNC: 24.6 MMOL/L (ref 22–29)
CREAT SERPL-MCNC: 0.82 MG/DL (ref 0.57–1)
DEPRECATED RDW RBC AUTO: 39.2 FL (ref 37–54)
EGFRCR SERPLBLD CKD-EPI 2021: 94.6 ML/MIN/1.73
EOSINOPHIL # BLD AUTO: 0.15 10*3/MM3 (ref 0–0.4)
EOSINOPHIL NFR BLD AUTO: 1.9 % (ref 0.3–6.2)
ERYTHROCYTE [DISTWIDTH] IN BLOOD BY AUTOMATED COUNT: 12.2 % (ref 12.3–15.4)
FERRITIN SERPL-MCNC: 25.83 NG/ML (ref 13–150)
GLOBULIN UR ELPH-MCNC: 2.3 GM/DL
GLUCOSE SERPL-MCNC: 104 MG/DL (ref 65–99)
HBA1C MFR BLD: 5.5 % (ref 4.8–5.6)
HCT VFR BLD AUTO: 43.7 % (ref 34–46.6)
HDLC SERPL-MCNC: 62 MG/DL (ref 40–60)
HGB BLD-MCNC: 15.2 G/DL (ref 12–15.9)
IMM GRANULOCYTES # BLD AUTO: 0.01 10*3/MM3 (ref 0–0.05)
IMM GRANULOCYTES NFR BLD AUTO: 0.1 % (ref 0–0.5)
LDLC SERPL CALC-MCNC: 128 MG/DL (ref 0–100)
LDLC/HDLC SERPL: 2.01 {RATIO}
LYMPHOCYTES # BLD AUTO: 3.85 10*3/MM3 (ref 0.7–3.1)
LYMPHOCYTES NFR BLD AUTO: 49.8 % (ref 19.6–45.3)
MCH RBC QN AUTO: 30.5 PG (ref 26.6–33)
MCHC RBC AUTO-ENTMCNC: 34.8 G/DL (ref 31.5–35.7)
MCV RBC AUTO: 87.8 FL (ref 79–97)
MONOCYTES # BLD AUTO: 0.57 10*3/MM3 (ref 0.1–0.9)
MONOCYTES NFR BLD AUTO: 7.4 % (ref 5–12)
NEUTROPHILS NFR BLD AUTO: 3.08 10*3/MM3 (ref 1.7–7)
NEUTROPHILS NFR BLD AUTO: 39.9 % (ref 42.7–76)
NRBC BLD AUTO-RTO: 0 /100 WBC (ref 0–0.2)
PLATELET # BLD AUTO: 214 10*3/MM3 (ref 140–450)
PMV BLD AUTO: 9.7 FL (ref 6–12)
POTASSIUM SERPL-SCNC: 4.8 MMOL/L (ref 3.5–5.2)
PROT SERPL-MCNC: 6.6 G/DL (ref 6–8.5)
RBC # BLD AUTO: 4.98 10*6/MM3 (ref 3.77–5.28)
SODIUM SERPL-SCNC: 139 MMOL/L (ref 136–145)
TRIGL SERPL-MCNC: 133 MG/DL (ref 0–150)
VLDLC SERPL-MCNC: 23 MG/DL (ref 5–40)
WBC NRBC COR # BLD: 7.73 10*3/MM3 (ref 3.4–10.8)

## 2022-11-22 PROCEDURE — 83695 ASSAY OF LIPOPROTEIN(A): CPT

## 2022-11-22 PROCEDURE — 85025 COMPLETE CBC W/AUTO DIFF WBC: CPT

## 2022-11-22 PROCEDURE — 80061 LIPID PANEL: CPT

## 2022-11-22 PROCEDURE — 83036 HEMOGLOBIN GLYCOSYLATED A1C: CPT

## 2022-11-22 PROCEDURE — 82728 ASSAY OF FERRITIN: CPT

## 2022-11-22 PROCEDURE — 80053 COMPREHEN METABOLIC PANEL: CPT

## 2022-11-22 PROCEDURE — 82172 ASSAY OF APOLIPOPROTEIN: CPT

## 2022-11-23 LAB — LPA SERPL-SCNC: 39 NMOL/L

## 2022-11-24 LAB — APO B SERPL-MCNC: 100 MG/DL

## 2022-12-13 ENCOUNTER — OFFICE VISIT (OUTPATIENT)
Dept: OBSTETRICS AND GYNECOLOGY | Facility: CLINIC | Age: 37
End: 2022-12-13

## 2022-12-13 ENCOUNTER — OFFICE VISIT (OUTPATIENT)
Dept: CARDIOLOGY | Facility: CLINIC | Age: 37
End: 2022-12-13

## 2022-12-13 VITALS
DIASTOLIC BLOOD PRESSURE: 74 MMHG | BODY MASS INDEX: 34.96 KG/M2 | HEART RATE: 98 BPM | RESPIRATION RATE: 14 BRPM | SYSTOLIC BLOOD PRESSURE: 126 MMHG | HEIGHT: 62 IN | WEIGHT: 190 LBS | OXYGEN SATURATION: 97 %

## 2022-12-13 VITALS — BODY MASS INDEX: 35.12 KG/M2 | WEIGHT: 192 LBS | RESPIRATION RATE: 14 BRPM

## 2022-12-13 DIAGNOSIS — N90.89 SKIN TAG OF FEMALE PERINEUM: Primary | ICD-10-CM

## 2022-12-13 DIAGNOSIS — R00.2 PALPITATIONS: Primary | ICD-10-CM

## 2022-12-13 PROBLEM — E83.19 OTHER DISORDERS OF IRON METABOLISM: Status: RESOLVED | Noted: 2019-05-13 | Resolved: 2022-12-13

## 2022-12-13 PROBLEM — R10.84 GENERALIZED ABDOMINAL PAIN: Status: RESOLVED | Noted: 2019-05-13 | Resolved: 2022-12-13

## 2022-12-13 PROBLEM — R55 SYNCOPE: Status: RESOLVED | Noted: 2019-05-13 | Resolved: 2022-12-13

## 2022-12-13 PROBLEM — F43.10 PTSD (POST-TRAUMATIC STRESS DISORDER): Status: ACTIVE | Noted: 2017-01-01

## 2022-12-13 PROBLEM — R07.9 CHEST PAIN: Status: RESOLVED | Noted: 2019-05-13 | Resolved: 2022-12-13

## 2022-12-13 PROBLEM — M54.2 NECK PAIN: Status: RESOLVED | Noted: 2019-05-13 | Resolved: 2022-12-13

## 2022-12-13 PROBLEM — R00.0 TACHYCARDIA: Status: RESOLVED | Noted: 2017-03-14 | Resolved: 2022-12-13

## 2022-12-13 PROBLEM — K64.4 EXTERNAL HEMORRHOIDS: Status: RESOLVED | Noted: 2019-05-13 | Resolved: 2022-12-13

## 2022-12-13 PROBLEM — K58.9 IRRITABLE BOWEL SYNDROME: Status: RESOLVED | Noted: 2019-05-13 | Resolved: 2022-12-13

## 2022-12-13 PROBLEM — F33.1 MODERATE RECURRENT MAJOR DEPRESSION (HCC): Status: RESOLVED | Noted: 2019-05-13 | Resolved: 2022-12-13

## 2022-12-13 PROBLEM — E78.5 DYSLIPIDEMIA: Status: RESOLVED | Noted: 2019-05-13 | Resolved: 2022-12-13

## 2022-12-13 PROBLEM — I48.91 ATRIAL FIBRILLATION (HCC): Status: RESOLVED | Noted: 2019-05-13 | Resolved: 2022-12-13

## 2022-12-13 PROBLEM — R11.10 NON-INTRACTABLE VOMITING: Status: RESOLVED | Noted: 2021-12-01 | Resolved: 2022-12-13

## 2022-12-13 PROCEDURE — 99214 OFFICE O/P EST MOD 30 MIN: CPT | Performed by: OBSTETRICS & GYNECOLOGY

## 2022-12-13 PROCEDURE — 93000 ELECTROCARDIOGRAM COMPLETE: CPT | Performed by: PHYSICIAN ASSISTANT

## 2022-12-13 PROCEDURE — 99214 OFFICE O/P EST MOD 30 MIN: CPT | Performed by: PHYSICIAN ASSISTANT

## 2022-12-13 NOTE — PROGRESS NOTES
[facility] at [facility]   OFFICE NOTE      Cristina Haines  1985  PCP: Rahat Mercedes MD    SUBJECTIVE:   Cristina Haines is a 37 y.o. female seen for a follow up visit regarding the following:     CC:VVS    HPI:   37 year old presents for follow up regarding VVS, IST, HTN. She states since she was seen by our office she was admitted to [facility] for COVID symptoms. She had a really dificult time with this. She has in the hospital for 3 days for hypoxia. She has now recovered. Currently she states her heart and bp is stable. She currently denies SOB, CP, LH, and dizziness. Denies any hospitalizations, ER visits, bleeding, or TIA/CVA symptoms. Overall feels well.    Cardiac PMH: (Old records have been reviewed and summarized below)  1.  Vasovagal syncope:               A.  Echocardiogram, 04/12/2007, showing trace MR, TR, EF 60%.              B.  Echocardiogram, March 2008: Normal LV size and function.               C.  Echocardiogram, November 2013: EF 60% to 65%, trace MR.    D. Echocardiogram Normal EF 65%. No significant VHD.   2.  Inappropriate sinus tachycardia:               A.  Electrophysiology study with radiofrequency ablation, June 2011 at [facility] in [place], [place], no data.               B.  Echocardiogram February 2016: EF 60% to 65%, impaired relaxation noted. Trace MR, trace TR.    3.  Essential Hypertension.    4. Recent COVID 1/2022,   4.  Migraine headaches.   5.  Probable fibromyalgia.   6.  Hereditary hemochromatosis, status post liver biopsy.   7.  Depression.   8.  Dyslipidemia  9.  Status post hysterectomy, August 2013.          Past Medical History, Past Surgical History, Family history, Social History, and Medications were all reviewed with the patient today and updated as necessary.       Current Outpatient Medications:   •  bisoprolol (ZEBeta) 10 MG tablet, Take 1 tablet by mouth Daily., Disp: 30 tablet, Rfl: 5  •  busPIRone (BUSPAR)  "30 MG tablet, Take 1 tablet by mouth 2 (Two) Times a Day., Disp: 60 tablet, Rfl: 5  •  cetirizine (zyrTEC) 10 MG tablet, Take 10 mg by mouth Daily., Disp: , Rfl:   •  cholecalciferol (VITAMIN D3) 25 MCG (1000 UT) tablet, Take 1,000 Units by mouth Daily., Disp: , Rfl:   •  desvenlafaxine (PRISTIQ) 100 MG 24 hr tablet, TAKE ONE TABLET BY MOUTH EVERY DAY, Disp: 30 tablet, Rfl: 11  •  Melatonin 10 MG tablet, Take 1 tablet by mouth At Night As Needed., Disp: , Rfl:   •  Multiple Vitamin (MULTI-VITAMIN DAILY PO), Take 1 tablet by mouth Daily As Needed., Disp: , Rfl:   •  omeprazole (priLOSEC) 20 MG capsule, TAKE ONE CAPSULE BY MOUTH EVERY DAY, Disp: 30 capsule, Rfl: 5  •  phentermine 37.5 MG capsule, Take 1 capsule by mouth Every Morning., Disp: 30 capsule, Rfl: 2      No Known Allergies      PHYSICAL EXAM:    /74 (BP Location: Left arm, Patient Position: Sitting, Cuff Size: Adult)   Pulse 98   Resp 14   Ht 157.5 cm (62\")   Wt 86.2 kg (190 lb)   LMP  (LMP Unknown)   SpO2 97%   BMI 34.75 kg/m²        Wt Readings from Last 5 Encounters:   12/13/22 86.2 kg (190 lb)   11/08/22 87.1 kg (192 lb)   07/12/22 85.7 kg (189 lb)   04/27/22 88.2 kg (194 lb 6.4 oz)   04/12/22 88.9 kg (196 lb)       BP Readings from Last 5 Encounters:   12/13/22 126/74   11/08/22 102/70   10/05/22 118/68   09/09/22 104/76   07/12/22 100/65       General appearance - Alert, well appearing, and in no distress   Mental status - Affect appropriate to mood.  Eyes - Sclerae anicteric,  ENMT - Hearing grossly normal bilaterally, Dental hygiene good.  Neck - Carotids upstroke normal bilaterally, no bruits, no JVD.  Resp - Clear to auscultation, no wheezes, rales or rhonchi, symmetric air entry.  Heart - Normal rate, regular rhythm, normal S1, S2, no murmurs, rubs, clicks or gallops.  GI - Soft, nontender, nondistended, no masses or organomegaly.  Neurological - Grossly intact - normal speech, no focal findings  Musculoskeletal - No joint " tenderness, deformity or swelling, no muscular tenderness noted.  Extremities - Peripheral pulses normal, no pedal edema, no clubbing or cyanosis.  Skin - Normal coloration and turgor.  Psych -  oriented to person, place, and time.    Medical problems and test results were reviewed with the patient today.     Recent Results (from the past 672 hour(s))   Lipid Panel    Collection Time: 11/22/22  8:19 AM    Specimen: Blood   Result Value Ref Range    Total Cholesterol 213 (H) 0 - 200 mg/dL    Triglycerides 133 0 - 150 mg/dL    HDL Cholesterol 62 (H) 40 - 60 mg/dL    LDL Cholesterol  128 (H) 0 - 100 mg/dL    VLDL Cholesterol 23 5 - 40 mg/dL    LDL/HDL Ratio 2.01    Ferritin    Collection Time: 11/22/22  8:19 AM    Specimen: Blood   Result Value Ref Range    Ferritin 25.83 13.00 - 150.00 ng/mL   Comprehensive Metabolic Panel    Collection Time: 11/22/22  8:19 AM    Specimen: Blood   Result Value Ref Range    Glucose 104 (H) 65 - 99 mg/dL    BUN 16 6 - 20 mg/dL    Creatinine 0.82 0.57 - 1.00 mg/dL    Sodium 139 136 - 145 mmol/L    Potassium 4.8 3.5 - 5.2 mmol/L    Chloride 104 98 - 107 mmol/L    CO2 24.6 22.0 - 29.0 mmol/L    Calcium 9.7 8.6 - 10.5 mg/dL    Total Protein 6.6 6.0 - 8.5 g/dL    Albumin 4.30 3.50 - 5.20 g/dL    ALT (SGPT) 16 1 - 33 U/L    AST (SGOT) 15 1 - 32 U/L    Alkaline Phosphatase 54 39 - 117 U/L    Total Bilirubin 0.2 0.0 - 1.2 mg/dL    Globulin 2.3 gm/dL    A/G Ratio 1.9 g/dL    BUN/Creatinine Ratio 19.5 7.0 - 25.0    Anion Gap 10.4 5.0 - 15.0 mmol/L    eGFR 94.6 >60.0 mL/min/1.73   Hemoglobin A1c    Collection Time: 11/22/22  8:19 AM    Specimen: Blood   Result Value Ref Range    Hemoglobin A1C 5.50 4.80 - 5.60 %   Apolipoprotein B    Collection Time: 11/22/22  8:19 AM    Specimen: Blood   Result Value Ref Range    Apolipoprotein B 100 (H) <90 mg/dL   Lipoprotein A (LPA)    Collection Time: 11/22/22  8:19 AM    Specimen: Blood   Result Value Ref Range    Lipoprotein (a) 39.0 <75.0 nmol/L   CBC Auto  Differential    Collection Time: 11/22/22  8:19 AM    Specimen: Blood   Result Value Ref Range    WBC 7.73 3.40 - 10.80 10*3/mm3    RBC 4.98 3.77 - 5.28 10*6/mm3    Hemoglobin 15.2 12.0 - 15.9 g/dL    Hematocrit 43.7 34.0 - 46.6 %    MCV 87.8 79.0 - 97.0 fL    MCH 30.5 26.6 - 33.0 pg    MCHC 34.8 31.5 - 35.7 g/dL    RDW 12.2 (L) 12.3 - 15.4 %    RDW-SD 39.2 37.0 - 54.0 fl    MPV 9.7 6.0 - 12.0 fL    Platelets 214 140 - 450 10*3/mm3    Neutrophil % 39.9 (L) 42.7 - 76.0 %    Lymphocyte % 49.8 (H) 19.6 - 45.3 %    Monocyte % 7.4 5.0 - 12.0 %    Eosinophil % 1.9 0.3 - 6.2 %    Basophil % 0.9 0.0 - 1.5 %    Immature Grans % 0.1 0.0 - 0.5 %    Neutrophils, Absolute 3.08 1.70 - 7.00 10*3/mm3    Lymphocytes, Absolute 3.85 (H) 0.70 - 3.10 10*3/mm3    Monocytes, Absolute 0.57 0.10 - 0.90 10*3/mm3    Eosinophils, Absolute 0.15 0.00 - 0.40 10*3/mm3    Basophils, Absolute 0.07 0.00 - 0.20 10*3/mm3    Immature Grans, Absolute 0.01 0.00 - 0.05 10*3/mm3    nRBC 0.0 0.0 - 0.2 /100 WBC         EKG: (EKG has been independently visualized by me and summarized below)    ECG 12 Lead    Date/Time: 12/13/2022 9:40 AM  Performed by: Jose Stevenson PA  Authorized by: Jose Stevenson PA   Comparison: compared with previous ECG from 12/3/2022  Similar to previous ECG  Rhythm: sinus rhythm  Rate: normal  Conduction: conduction normal  ST Segments: ST segments normal  T Waves: T waves normal  QRS axis: normal  Other: no other findings    Clinical impression: normal ECG              ASSESSMENT   1. IST, s/p remote RFA 2011. Controlled Zebeta.   2. VVS: aggressive hydration, exercise.   3. HTN: Controlled on current medication.      PLAN  · Continue lifestyle changes-Hydration, exercise, weight loss.   · Follow up 8 months Dr. Gentile       12/13/2022  09:34 EST  Will Elva MITCHELL

## 2022-12-13 NOTE — PROGRESS NOTES
Subjective   Chief Complaint   Patient presents with   • Follow-up     Skin tag     Cristina Haines is a 37 y.o. year old .  She comes today to have a couple areas on the vulvovaginal area checked.  She has had these for many years.  She has paranoia that these could be HPV related.  She was seen by dermatology who said that the areas of concern are just part of her vagina.  They do bother her at times.  She would like to have them removed if possible.    OTHER THINGS SHE WANTS TO DISCUSS TODAY:  Nothing else    The following portions of the patient's history were reviewed and updated as appropriate:current medications, allergies, past medical history, past social history and past surgical history    Social History    Tobacco Use      Smoking status: Never      Smokeless tobacco: Never    Review of Systems  Constitutional POS: nothing reported    NEG: anorexia or night sweats   Genitourinary POS: nothing reported    NEG: dysuria or hematuria   Gastointestinal POS: nothing reported    NEG: bloating, change in bowel habits, melena or reflux symptoms   Integument POS: nothing reported    NEG: moles that are changing in size, shape, color or rashes   Breast POS: nothing reported    NEG: persistent breast lump, skin dimpling or nipple discharge         Objective   Resp 14   Wt 87.1 kg (192 lb)   LMP  (LMP Unknown)   BMI 35.12 kg/m²     General:  well developed; well nourished  no acute distress   Pelvis: Clinical staff was present for exam  External genitalia:  normal appearance of the external genitalia including Bartholin's and [place]'s glands. There are redundant folds of skin on the perineum paralleling both the left and right labia majora but no definitive evidence of genital warts     Lab Review   No data reviewed    Imaging   No data reviewed        Assessment   1. Redundant perineal skin most consistent with skin tags without definitive evidence of genital warts     Plan   1. If at this point if she  wants something done this could be treated with laser ablation of the area in the operating room with representative sampling to exclude warts although I think this is highly unlikely to be present  2. The importance of keeping all planned follow-up and taking all medications as prescribed was emphasized.  3. Follow up only if she wants something done           This note was electronically signed.    Felipe Kinsey M.D.  December 13, 2022    Part of this note may be an electronic transcription/translation of spoken language to printed text using the Dragon Dictation System.

## 2023-01-10 ENCOUNTER — OFFICE VISIT (OUTPATIENT)
Dept: ONCOLOGY | Facility: CLINIC | Age: 38
End: 2023-01-10
Payer: COMMERCIAL

## 2023-01-10 ENCOUNTER — LAB (OUTPATIENT)
Dept: LAB | Facility: HOSPITAL | Age: 38
End: 2023-01-10
Payer: COMMERCIAL

## 2023-01-10 VITALS
HEART RATE: 80 BPM | WEIGHT: 190 LBS | OXYGEN SATURATION: 99 % | SYSTOLIC BLOOD PRESSURE: 111 MMHG | DIASTOLIC BLOOD PRESSURE: 72 MMHG | RESPIRATION RATE: 16 BRPM | TEMPERATURE: 97.3 F | HEIGHT: 62 IN | BODY MASS INDEX: 34.96 KG/M2

## 2023-01-10 DIAGNOSIS — E83.110 HEREDITARY HEMOCHROMATOSIS: Primary | ICD-10-CM

## 2023-01-10 DIAGNOSIS — E83.110 HEREDITARY HEMOCHROMATOSIS: ICD-10-CM

## 2023-01-10 LAB
BASOPHILS # BLD AUTO: 0.07 10*3/MM3 (ref 0–0.2)
BASOPHILS NFR BLD AUTO: 0.9 % (ref 0–1.5)
DEPRECATED RDW RBC AUTO: 40.6 FL (ref 37–54)
EOSINOPHIL # BLD AUTO: 0.2 10*3/MM3 (ref 0–0.4)
EOSINOPHIL NFR BLD AUTO: 2.6 % (ref 0.3–6.2)
ERYTHROCYTE [DISTWIDTH] IN BLOOD BY AUTOMATED COUNT: 12.4 % (ref 12.3–15.4)
FERRITIN SERPL-MCNC: 39.11 NG/ML (ref 13–150)
HCT VFR BLD AUTO: 43.8 % (ref 34–46.6)
HGB BLD-MCNC: 14.8 G/DL (ref 12–15.9)
IMM GRANULOCYTES # BLD AUTO: 0.01 10*3/MM3 (ref 0–0.05)
IMM GRANULOCYTES NFR BLD AUTO: 0.1 % (ref 0–0.5)
LYMPHOCYTES # BLD AUTO: 3.86 10*3/MM3 (ref 0.7–3.1)
LYMPHOCYTES NFR BLD AUTO: 50.9 % (ref 19.6–45.3)
MCH RBC QN AUTO: 30.4 PG (ref 26.6–33)
MCHC RBC AUTO-ENTMCNC: 33.8 G/DL (ref 31.5–35.7)
MCV RBC AUTO: 89.9 FL (ref 79–97)
MONOCYTES # BLD AUTO: 0.64 10*3/MM3 (ref 0.1–0.9)
MONOCYTES NFR BLD AUTO: 8.4 % (ref 5–12)
NEUTROPHILS NFR BLD AUTO: 2.8 10*3/MM3 (ref 1.7–7)
NEUTROPHILS NFR BLD AUTO: 37.1 % (ref 42.7–76)
NRBC BLD AUTO-RTO: 0 /100 WBC (ref 0–0.2)
PLATELET # BLD AUTO: 224 10*3/MM3 (ref 140–450)
PMV BLD AUTO: 9.5 FL (ref 6–12)
RBC # BLD AUTO: 4.87 10*6/MM3 (ref 3.77–5.28)
WBC NRBC COR # BLD: 7.58 10*3/MM3 (ref 3.4–10.8)

## 2023-01-10 PROCEDURE — 82728 ASSAY OF FERRITIN: CPT

## 2023-01-10 PROCEDURE — 99214 OFFICE O/P EST MOD 30 MIN: CPT | Performed by: NURSE PRACTITIONER

## 2023-01-10 PROCEDURE — 85025 COMPLETE CBC W/AUTO DIFF WBC: CPT
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
## 2023-01-16 DIAGNOSIS — E66.01 MORBID OBESITY: ICD-10-CM

## 2023-01-16 RX ORDER — PHENTERMINE HYDROCHLORIDE 37.5 MG/1
37.5 CAPSULE ORAL EVERY MORNING
Qty: 30 CAPSULE | Refills: 2 | Status: SHIPPED | OUTPATIENT
Start: 2023-01-16

## 2023-01-24 ENCOUNTER — APPOINTMENT (OUTPATIENT)
Dept: LAB | Facility: HOSPITAL | Age: 38
End: 2023-01-24
Payer: COMMERCIAL

## 2023-02-27 RX ORDER — DESVENLAFAXINE 100 MG/1
TABLET, EXTENDED RELEASE ORAL
Qty: 30 TABLET | Refills: 11 | Status: SHIPPED | OUTPATIENT
Start: 2023-02-27

## 2023-03-05 DIAGNOSIS — K21.9 GASTROESOPHAGEAL REFLUX DISEASE: ICD-10-CM

## 2023-03-06 RX ORDER — OMEPRAZOLE 20 MG/1
CAPSULE, DELAYED RELEASE ORAL
Qty: 30 CAPSULE | Refills: 5 | Status: SHIPPED | OUTPATIENT
Start: 2023-03-06

## 2023-03-22 RX ORDER — BISOPROLOL FUMARATE 10 MG/1
TABLET, FILM COATED ORAL
Qty: 30 TABLET | Refills: 5 | Status: SHIPPED | OUTPATIENT
Start: 2023-03-22

## 2023-04-10 RX ORDER — BUSPIRONE HYDROCHLORIDE 30 MG/1
TABLET ORAL
Qty: 60 TABLET | Refills: 5 | Status: SHIPPED | OUTPATIENT
Start: 2023-04-10

## 2023-04-10 NOTE — TELEPHONE ENCOUNTER
Rx Refill Note  Requested Prescriptions     Pending Prescriptions Disp Refills   • busPIRone (BUSPAR) 30 MG tablet [Pharmacy Med Name: buspirone 30 mg tablet] 60 tablet 5     Sig: TAKE ONE TABLET BY MOUTH TWICE DAILY      Last office visit with prescribing clinician: 11/8/2022   Last telemedicine visit with prescribing clinician: 5/8/2023   Next office visit with prescribing clinician: 5/8/2023                         Would you like a call back once the refill request has been completed: [] Yes [] No    If the office needs to give you a call back, can they leave a voicemail: [] Yes [] No    Dominique Nichols LPN  04/10/23, 08:53 EDT

## 2023-05-15 ENCOUNTER — OFFICE VISIT (OUTPATIENT)
Dept: OBSTETRICS AND GYNECOLOGY | Facility: CLINIC | Age: 38
End: 2023-05-15
Payer: COMMERCIAL

## 2023-05-15 ENCOUNTER — OFFICE VISIT (OUTPATIENT)
Dept: INTERNAL MEDICINE | Facility: CLINIC | Age: 38
End: 2023-05-15
Payer: COMMERCIAL

## 2023-05-15 VITALS
SYSTOLIC BLOOD PRESSURE: 120 MMHG | WEIGHT: 195.4 LBS | DIASTOLIC BLOOD PRESSURE: 82 MMHG | BODY MASS INDEX: 35.96 KG/M2 | HEIGHT: 62 IN

## 2023-05-15 VITALS
WEIGHT: 194.4 LBS | HEART RATE: 96 BPM | DIASTOLIC BLOOD PRESSURE: 72 MMHG | HEIGHT: 62 IN | BODY MASS INDEX: 35.77 KG/M2 | TEMPERATURE: 98.6 F | SYSTOLIC BLOOD PRESSURE: 106 MMHG

## 2023-05-15 DIAGNOSIS — E78.2 MIXED HYPERLIPIDEMIA: ICD-10-CM

## 2023-05-15 DIAGNOSIS — F43.10 PTSD (POST-TRAUMATIC STRESS DISORDER): ICD-10-CM

## 2023-05-15 DIAGNOSIS — R73.09 ABNORMAL GLUCOSE: ICD-10-CM

## 2023-05-15 DIAGNOSIS — E55.9 VITAMIN D DEFICIENCY: ICD-10-CM

## 2023-05-15 DIAGNOSIS — Z01.419 ENCOUNTER FOR GYNECOLOGICAL EXAMINATION WITHOUT ABNORMAL FINDING: Primary | ICD-10-CM

## 2023-05-15 DIAGNOSIS — Z00.00 PREVENTATIVE HEALTH CARE: Primary | ICD-10-CM

## 2023-05-15 DIAGNOSIS — F32.5 MAJOR DEPRESSIVE DISORDER WITH SINGLE EPISODE, IN FULL REMISSION: ICD-10-CM

## 2023-05-15 DIAGNOSIS — E83.110 HEREDITARY HEMOCHROMATOSIS: ICD-10-CM

## 2023-05-15 RX ORDER — FLUCONAZOLE 200 MG/1
TABLET ORAL
COMMUNITY
Start: 2023-05-10 | End: 2023-05-15

## 2023-05-15 RX ORDER — AMOXICILLIN AND CLAVULANATE POTASSIUM 875; 125 MG/1; MG/1
1 TABLET, FILM COATED ORAL EVERY 12 HOURS SCHEDULED
COMMUNITY
Start: 2023-05-10

## 2023-05-15 NOTE — PROGRESS NOTES
"  Annual Visit     Patient Name: Cristina Haines  : 1985   MRN: [id number]   Care Team: Patient Care Team:  Rahat Mercedes MD as PCP - General  Rahat Mercedes MD as PCP - Family Medicine  Yonas Pavon MD as Consulting Physician (Hematology and Oncology)  Dashawn Gentile MD as Consulting Physician (Cardiology)  Anny Chambers APRN as Nurse Practitioner (Nurse Practitioner)    Chief Complaint:    Chief Complaint   Patient presents with   • Annual Exam       HPI: Cristina Haines is a 37 y.o. year old  presenting to be seen for her gynecologic exam.   S/p total hyst with bilateral salpingectomy d/t endometriosis and adenomyosis in 2013 - ovaries in situ      CBE done in 2021 - right breast lump noted approx 1cm size at 3 o'clock approx 8cm from areolar border   Dx mammogram and u/s done at [facility] and findings were benign   States the radiologist told her it was fibrocystic/dense tissue and nothing of concern - start mammograms at age 39   She performs monthly SBEs and hasn't noticed any changes since that time      Hx a. Fib - cardiac ablation in    A.fib controlled now with medications      Hx hemochromatosis - hasn't had to have phlebotomy done in almost a yr now   States after the hysterectomy when she stopped having periods, this became more of an issue     Reported ? skin tag at last visit - noted on right labia majora   Saw Dr. 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    /82   Ht 157.5 cm (62\")   Wt 88.6 kg (195 lb 6.4 oz)   LMP  (LMP Unknown)   Breastfeeding No   BMI 35.74 kg/m² "     BMI reviewed: Body mass index is 35.74 kg/m².      Objective     Physical Exam    Neuro: alert and oriented to person, place and time   General:  alert; cooperative; well developed; well nourished   Skin:  No suspicious lesions seen   Thyroid: normal to inspection and palpation   Lungs:  breathing is unlabored  clear to auscultation bilaterally   Heart:  regular rate and rhythm, S1, S2 normal, no murmur, click, rub or gallop  normal apical impulse   Breasts:  Examined in supine position  Symmetric without masses or skin dimpling  Nipples normal without inversion, lesions or discharge  There are no palpable axillary nodes  Fibrocystic changes are present both breasts without a discrete mass   Abdomen: soft, non-tender; no masses  no umbilical or inguinal hernias are present  no hepato-splenomegaly   Pelvis: Clinical staff was present for exam  External genitalia:  normal appearance of the external genitalia including Bartholin's and [place]'s glands.  ? skin tag right labia majora approx 3mm size - flesh colored and flat   :  urethral meatus normal;  Vaginal:  normal pink mucosa without prolapse or lesions.  Cervix:  absent.  Uterus:  absent.  Adnexa:  normal bimanual exam of the adnexa.  Rectal:  digital rectal exam not performed; anus visually normal appearing.         Assessment / Plan      Assessment  Problems Addressed This Visit    ICD-10-CM ICD-9-CM   1. Encounter for gynecological examination without abnormal finding  Z01.419 V72.31       Plan    Discussed monthly SBEs and fibrocystic breast changes   CBE WNL today   If changes noted on SBEs she will RTC - pt v/u   ? Skin tag on labia majora unchanged from previous exam   IF she desires intervention in future, will have her f/u with Dr. Kinsey      AV 1 yr         19 to 39: Counseling/Anticipatory Guidance Discussed: breast cancer and self breast exams    Follow Up  Return in about 1 year (around 5/15/2024) for Annual physical.  Patient was given  instructions and counseling regarding her condition or for health maintenance advice. Please see specific information pulled into the AVS if appropriate.     Anny Chambers, APRN  May 15, 2023  14:14 EDT
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
## 2023-09-04 DIAGNOSIS — K21.9 GASTROESOPHAGEAL REFLUX DISEASE: ICD-10-CM

## 2023-09-05 RX ORDER — OMEPRAZOLE 20 MG/1
CAPSULE, DELAYED RELEASE ORAL
Qty: 30 CAPSULE | Refills: 5 | Status: SHIPPED | OUTPATIENT
Start: 2023-09-05

## 2023-09-12 ENCOUNTER — OFFICE VISIT (OUTPATIENT)
Dept: ONCOLOGY | Facility: CLINIC | Age: 38
End: 2023-09-12
Payer: COMMERCIAL

## 2023-09-12 ENCOUNTER — LAB (OUTPATIENT)
Dept: LAB | Facility: HOSPITAL | Age: 38
End: 2023-09-12
Payer: COMMERCIAL

## 2023-09-12 VITALS
HEIGHT: 62 IN | HEART RATE: 84 BPM | SYSTOLIC BLOOD PRESSURE: 112 MMHG | TEMPERATURE: 97.7 F | OXYGEN SATURATION: 99 % | BODY MASS INDEX: 36.99 KG/M2 | WEIGHT: 201 LBS | RESPIRATION RATE: 16 BRPM | DIASTOLIC BLOOD PRESSURE: 74 MMHG

## 2023-09-12 DIAGNOSIS — E83.110 HEREDITARY HEMOCHROMATOSIS: ICD-10-CM

## 2023-09-12 DIAGNOSIS — E83.110 HEREDITARY HEMOCHROMATOSIS: Primary | ICD-10-CM

## 2023-09-12 LAB
BASOPHILS # BLD AUTO: 0.06 10*3/MM3 (ref 0–0.2)
BASOPHILS NFR BLD AUTO: 0.8 % (ref 0–1.5)
DEPRECATED RDW RBC AUTO: 40.7 FL (ref 37–54)
EOSINOPHIL # BLD AUTO: 0.19 10*3/MM3 (ref 0–0.4)
EOSINOPHIL NFR BLD AUTO: 2.4 % (ref 0.3–6.2)
ERYTHROCYTE [DISTWIDTH] IN BLOOD BY AUTOMATED COUNT: 12.4 % (ref 12.3–15.4)
FERRITIN SERPL-MCNC: 54.75 NG/ML (ref 13–150)
HCT VFR BLD AUTO: 44.7 % (ref 34–46.6)
HGB BLD-MCNC: 15.1 G/DL (ref 12–15.9)
IMM GRANULOCYTES # BLD AUTO: 0.01 10*3/MM3 (ref 0–0.05)
IMM GRANULOCYTES NFR BLD AUTO: 0.1 % (ref 0–0.5)
LYMPHOCYTES # BLD AUTO: 4.3 10*3/MM3 (ref 0.7–3.1)
LYMPHOCYTES NFR BLD AUTO: 54.8 % (ref 19.6–45.3)
MCH RBC QN AUTO: 30.3 PG (ref 26.6–33)
MCHC RBC AUTO-ENTMCNC: 33.8 G/DL (ref 31.5–35.7)
MCV RBC AUTO: 89.8 FL (ref 79–97)
MONOCYTES # BLD AUTO: 0.61 10*3/MM3 (ref 0.1–0.9)
MONOCYTES NFR BLD AUTO: 7.8 % (ref 5–12)
NEUTROPHILS NFR BLD AUTO: 2.67 10*3/MM3 (ref 1.7–7)
NEUTROPHILS NFR BLD AUTO: 34.1 % (ref 42.7–76)
NRBC BLD AUTO-RTO: 0 /100 WBC (ref 0–0.2)
PLATELET # BLD AUTO: 204 10*3/MM3 (ref 140–450)
PMV BLD AUTO: 9.6 FL (ref 6–12)
RBC # BLD AUTO: 4.98 10*6/MM3 (ref 3.77–5.28)
WBC NRBC COR # BLD: 7.84 10*3/MM3 (ref 3.4–10.8)

## 2023-09-12 PROCEDURE — 99214 OFFICE O/P EST MOD 30 MIN: CPT | Performed by: NURSE PRACTITIONER

## 2023-09-12 PROCEDURE — 1126F AMNT PAIN NOTED NONE PRSNT: CPT | Performed by: NURSE PRACTITIONER

## 2023-09-12 PROCEDURE — 85025 COMPLETE CBC W/AUTO DIFF WBC: CPT

## 2023-09-12 PROCEDURE — 1160F RVW MEDS BY RX/DR IN RCRD: CPT | Performed by: NURSE PRACTITIONER

## 2023-09-12 PROCEDURE — 1159F MED LIST DOCD IN RCRD: CPT | Performed by: NURSE PRACTITIONER

## 2023-09-12 PROCEDURE — 82728 ASSAY OF FERRITIN: CPT
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
## 2023-09-13 RX ORDER — BISOPROLOL FUMARATE 10 MG/1
TABLET, FILM COATED ORAL
Qty: 30 TABLET | Refills: 5 | Status: SHIPPED | OUTPATIENT
Start: 2023-09-13

## 2023-09-26 ENCOUNTER — INFUSION (OUTPATIENT)
Dept: ONCOLOGY | Facility: HOSPITAL | Age: 38
End: 2023-09-26
Payer: COMMERCIAL

## 2023-09-26 VITALS
SYSTOLIC BLOOD PRESSURE: 104 MMHG | TEMPERATURE: 98.4 F | RESPIRATION RATE: 16 BRPM | HEART RATE: 77 BPM | WEIGHT: 200.62 LBS | BODY MASS INDEX: 36.69 KG/M2 | DIASTOLIC BLOOD PRESSURE: 66 MMHG

## 2023-09-26 DIAGNOSIS — E83.110 HEREDITARY HEMOCHROMATOSIS: Primary | ICD-10-CM

## 2023-09-26 PROCEDURE — 99195 PHLEBOTOMY: CPT

## 2023-10-04 DIAGNOSIS — E66.01 MORBID OBESITY: ICD-10-CM

## 2023-10-04 RX ORDER — PHENTERMINE HYDROCHLORIDE 37.5 MG/1
CAPSULE ORAL
Qty: 30 CAPSULE | Refills: 2 | Status: SHIPPED | OUTPATIENT
Start: 2023-10-04

## 2023-10-04 RX ORDER — BUSPIRONE HYDROCHLORIDE 30 MG/1
TABLET ORAL
Qty: 60 TABLET | Refills: 5 | Status: SHIPPED | OUTPATIENT
Start: 2023-10-04

## 2023-10-04 NOTE — TELEPHONE ENCOUNTER
Rx Refill Note  Requested Prescriptions     Pending Prescriptions Disp Refills    phentermine 37.5 MG capsule [Pharmacy Med Name: phentermine 37.5 mg capsule] 30 capsule 2     Sig: TAKE ONE CAPSULE BY MOUTH EVERY MORNING    busPIRone (BUSPAR) 30 MG tablet [Pharmacy Med Name: buspirone 30 mg tablet] 60 tablet 5     Sig: TAKE ONE TABLET BY MOUTH TWICE DAILY      Last office visit with prescribing clinician: 5/15/2023   Last telemedicine visit with prescribing clinician: Visit date not found   Next office visit with prescribing clinician: 11/21/2023                         Would you like a call back once the refill request has been completed: [] Yes [] No    If the office needs to give you a call back, can they leave a voicemail: [] Yes [] No    Dominique Nichols LPN  10/04/23, 09:19 EDT

## 2023-10-05 ENCOUNTER — OFFICE VISIT (OUTPATIENT)
Dept: CARDIOLOGY | Facility: CLINIC | Age: 38
End: 2023-10-05
Payer: COMMERCIAL

## 2023-10-05 VITALS
HEIGHT: 62 IN | OXYGEN SATURATION: 99 % | HEART RATE: 88 BPM | BODY MASS INDEX: 36.99 KG/M2 | SYSTOLIC BLOOD PRESSURE: 108 MMHG | DIASTOLIC BLOOD PRESSURE: 76 MMHG | WEIGHT: 201 LBS

## 2023-10-05 DIAGNOSIS — I47.11 INAPPROPRIATE SINUS TACHYCARDIA: Primary | ICD-10-CM

## 2023-10-05 DIAGNOSIS — R55 VASOVAGAL SYNCOPE: ICD-10-CM

## 2023-10-05 PROCEDURE — 99214 OFFICE O/P EST MOD 30 MIN: CPT | Performed by: PHYSICIAN ASSISTANT

## 2023-10-05 RX ORDER — BISOPROLOL FUMARATE 10 MG/1
10 TABLET, FILM COATED ORAL DAILY
Qty: 30 TABLET | Refills: 5 | Status: SHIPPED | OUTPATIENT
Start: 2023-10-05

## 2023-10-05 NOTE — PROGRESS NOTES
[facility]   OFFICE NOTE      Cristina Haines  1985  PCP: Rahat Mercedes MD    SUBJECTIVE:   Cristina Haines is a 37 y.o. female seen for a follow up visit regarding the following:     CC:VVS    HPI:   37 year old presents for follow up regarding VVS, IST, HTN.  She is doing quite well since last seen by our office.  She has had no recurrent vasovagal episodes, syncope.  She states she has had not any significant palpitations on Zebeta therapy.  She denies any heart failure symptoms.  Overall she feels like she is doing well she looks forward to this week if she is going on for breaking going fishing in a [place] down in [place].    Cardiac PMH: (Old records have been reviewed and summarized below)  1.  Vasovagal syncope:               A.  Echocardiogram, 04/12/2007, showing trace MR, TR, EF 60%.              B.  Echocardiogram, March 2008: Normal LV size and function.               C.  Echocardiogram, November 2013: EF 60% to 65%, trace MR.    D. Echocardiogram Normal EF 65%. No significant VHD. 12/2021.   2.  Inappropriate sinus tachycardia:               A.  Electrophysiology study with radiofrequency ablation, June 2011 at [facility] in [place], [place], no data.               B.  Echocardiogram February 2016: EF 60% to 65%, impaired relaxation noted. Trace MR, trace TR.    3.  Essential Hypertension.    4. Recent COVID 1/2022,   4.  Migraine headaches.   5.  Probable fibromyalgia.   6.  Hereditary hemochromatosis, status post liver biopsy.   7.  Depression.   8.  Dyslipidemia  9.  Status post hysterectomy, August 2013.          Past Medical History, Past Surgical History, Family history, Social History, and Medications were all reviewed with the patient today and updated as necessary.       Current Outpatient Medications:     bisoprolol (ZEBeta) 10 MG tablet, Take 1 tablet by mouth Daily., Disp: 30 tablet, Rfl: 5    busPIRone (BUSPAR) 30 MG  "tablet, TAKE ONE TABLET BY MOUTH TWICE DAILY, Disp: 60 tablet, Rfl: 5    cetirizine (zyrTEC) 10 MG tablet, Take 1 tablet by mouth Daily., Disp: , Rfl:     cholecalciferol (VITAMIN D3) 25 MCG (1000 UT) tablet, Take 1 tablet by mouth Daily., Disp: , Rfl:     desvenlafaxine (PRISTIQ) 100 MG 24 hr tablet, TAKE ONE TABLET BY MOUTH EVERY DAY, Disp: 30 tablet, Rfl: 11    Melatonin 10 MG tablet, Take 1 tablet by mouth At Night As Needed., Disp: , Rfl:     Multiple Vitamin (MULTI-VITAMIN DAILY PO), Take 1 tablet by mouth Daily As Needed., Disp: , Rfl:     omeprazole (priLOSEC) 20 MG capsule, TAKE ONE CAPSULE BY MOUTH EVERY DAY, Disp: 30 capsule, Rfl: 5    phentermine 37.5 MG capsule, TAKE ONE CAPSULE BY MOUTH EVERY MORNING, Disp: 30 capsule, Rfl: 2    rosuvastatin (Crestor) 10 MG tablet, Take 1 tablet by mouth Daily., Disp: 90 tablet, Rfl: 3      No Known Allergies      PHYSICAL EXAM:    /76 (BP Location: Right arm, Patient Position: Sitting)   Pulse 88   Ht 157.5 cm (62.01\")   Wt 91.2 kg (201 lb)   LMP  (LMP Unknown)   SpO2 99%   BMI 36.75 kg/m²        Wt Readings from Last 5 Encounters:   10/05/23 91.2 kg (201 lb)   09/26/23 91 kg (200 lb 9.9 oz)   09/12/23 91.2 kg (201 lb)   05/15/23 88.2 kg (194 lb 6.4 oz)   05/15/23 88.6 kg (195 lb 6.4 oz)       BP Readings from Last 5 Encounters:   10/05/23 108/76   09/26/23 104/66   09/12/23 112/74   05/15/23 106/72   05/15/23 120/82       General appearance - Alert, well appearing, and in no distress   Mental status - Affect appropriate to mood.  Eyes - Sclerae anicteric,  ENMT - Hearing grossly normal bilaterally, Dental hygiene good.  Neck - Carotids upstroke normal bilaterally, no bruits, no JVD.  Resp - Clear to auscultation, no wheezes, rales or rhonchi, symmetric air entry.  Heart - Normal rate, regular rhythm, normal S1, S2, no murmurs, rubs, clicks or gallops.  GI - Soft, nontender, nondistended, no masses or organomegaly.  Neurological - Grossly intact - normal " speech, no focal findings  Musculoskeletal - No joint tenderness, deformity or swelling, no muscular tenderness noted.  Extremities - Peripheral pulses normal, no pedal edema, no clubbing or cyanosis.  Skin - Normal coloration and turgor.  Psych -  oriented to person, place, and time.    Medical problems and test results were reviewed with the patient today.         Procedures      ASSESSMENT   1. IST, s/p remote RFA 2011. Controlled Zebeta.   2. VVS: aggressive hydration, exercise.   3. HTN: Controlled on current medication.      PLAN  Follow up one year .    10/5/2023  14:15 EDT  Will Elva MITCHELL

## 2023-10-09 RX ORDER — FLUCONAZOLE 150 MG/1
150 TABLET ORAL ONCE
Qty: 1 TABLET | Refills: 0 | Status: SHIPPED | OUTPATIENT
Start: 2023-10-09 | End: 2023-10-09

## 2023-11-14 ENCOUNTER — TELEPHONE (OUTPATIENT)
Dept: INTERNAL MEDICINE | Facility: CLINIC | Age: 38
End: 2023-11-14
Payer: COMMERCIAL

## 2023-11-14 DIAGNOSIS — E78.2 MIXED HYPERLIPIDEMIA: Primary | ICD-10-CM

## 2023-11-14 DIAGNOSIS — R73.09 ABNORMAL GLUCOSE: ICD-10-CM

## 2023-11-14 NOTE — TELEPHONE ENCOUNTER
Regarding: Labs  Contact: [phone]  ----- Message from Savannah Lopez MA sent at 11/14/2023  8:35 AM EST -----  Please put in external lab orders     ----- Message from Cristina Haines to Rahat Mercedes MD sent at 11/13/2023  8:15 PM -----   Dr. Mercedes,    Can you send in my lab orders for my upcoming appointment to [facility] in [place]? I will get them done prior to the appointment.     Thank you

## 2023-11-15 ENCOUNTER — TELEPHONE (OUTPATIENT)
Dept: OBSTETRICS AND GYNECOLOGY | Facility: CLINIC | Age: 38
End: 2023-11-15
Payer: COMMERCIAL

## 2023-11-15 NOTE — TELEPHONE ENCOUNTER
"\"She is probably having a local contact dermatitis reaction to the perfume.  She can take Zyrtec daily to help relieve any itching.  She could use an OTC strength of hydrocortisone cream to \"push the reset button\", and make her body quit over-reacting to it.  If the burning persists or worsens, she will need to be seen. Cool compresses or SITZ baths might give relief too.\" - Francisca Lipscomb    Called and spoke with patient and informed her of Francisca's response and advisement, she verified understanding and will let us know if her symptoms/irritation persists.   "

## 2023-11-15 NOTE — TELEPHONE ENCOUNTER
PT CALLING HAS SLIGHT BURNING IN VAGINAL AREA- NO ITCHING NO DISCHARGE - DISCOMFORT - DAUGHTER PLACED PERFUME ON TOWEL AND OPT USED AND EVERY SINCE THEN HAS HAD DISCOMFORT AND IRRITATION

## 2023-12-19 ENCOUNTER — LAB (OUTPATIENT)
Dept: LAB | Facility: HOSPITAL | Age: 38
End: 2023-12-19
Payer: COMMERCIAL

## 2023-12-19 DIAGNOSIS — E83.110 HEREDITARY HEMOCHROMATOSIS: ICD-10-CM

## 2023-12-19 DIAGNOSIS — R73.09 ABNORMAL GLUCOSE: ICD-10-CM

## 2023-12-19 DIAGNOSIS — E78.2 MIXED HYPERLIPIDEMIA: ICD-10-CM

## 2023-12-19 LAB
ALBUMIN SERPL-MCNC: 4.1 G/DL (ref 3.5–5.2)
ALBUMIN/GLOB SERPL: 1.6 G/DL
ALP SERPL-CCNC: 51 U/L (ref 39–117)
ALT SERPL W P-5'-P-CCNC: 21 U/L (ref 1–33)
ANION GAP SERPL CALCULATED.3IONS-SCNC: 11.2 MMOL/L (ref 5–15)
AST SERPL-CCNC: 26 U/L (ref 1–32)
BASOPHILS # BLD AUTO: 0.05 10*3/MM3 (ref 0–0.2)
BASOPHILS NFR BLD AUTO: 0.6 % (ref 0–1.5)
BILIRUB SERPL-MCNC: 0.3 MG/DL (ref 0–1.2)
BUN SERPL-MCNC: 16 MG/DL (ref 6–20)
BUN/CREAT SERPL: 22.9 (ref 7–25)
CALCIUM SPEC-SCNC: 9.1 MG/DL (ref 8.6–10.5)
CHLORIDE SERPL-SCNC: 108 MMOL/L (ref 98–107)
CHOLEST SERPL-MCNC: 138 MG/DL (ref 0–200)
CO2 SERPL-SCNC: 21.8 MMOL/L (ref 22–29)
CREAT SERPL-MCNC: 0.7 MG/DL (ref 0.57–1)
DEPRECATED RDW RBC AUTO: 38.6 FL (ref 37–54)
EGFRCR SERPLBLD CKD-EPI 2021: 113 ML/MIN/1.73
EOSINOPHIL # BLD AUTO: 0.14 10*3/MM3 (ref 0–0.4)
EOSINOPHIL NFR BLD AUTO: 1.6 % (ref 0.3–6.2)
ERYTHROCYTE [DISTWIDTH] IN BLOOD BY AUTOMATED COUNT: 11.8 % (ref 12.3–15.4)
FERRITIN SERPL-MCNC: 31.83 NG/ML (ref 13–150)
GLOBULIN UR ELPH-MCNC: 2.6 GM/DL
GLUCOSE SERPL-MCNC: 99 MG/DL (ref 65–99)
HBA1C MFR BLD: 5.5 % (ref 4.8–5.6)
HCT VFR BLD AUTO: 43.7 % (ref 34–46.6)
HDLC SERPL-MCNC: 54 MG/DL (ref 40–60)
HGB BLD-MCNC: 14.9 G/DL (ref 12–15.9)
IMM GRANULOCYTES # BLD AUTO: 0.02 10*3/MM3 (ref 0–0.05)
IMM GRANULOCYTES NFR BLD AUTO: 0.2 % (ref 0–0.5)
LDLC SERPL CALC-MCNC: 73 MG/DL (ref 0–100)
LDLC/HDLC SERPL: 1.37 {RATIO}
LYMPHOCYTES # BLD AUTO: 4.43 10*3/MM3 (ref 0.7–3.1)
LYMPHOCYTES NFR BLD AUTO: 51.7 % (ref 19.6–45.3)
MCH RBC QN AUTO: 30.8 PG (ref 26.6–33)
MCHC RBC AUTO-ENTMCNC: 34.1 G/DL (ref 31.5–35.7)
MCV RBC AUTO: 90.5 FL (ref 79–97)
MONOCYTES # BLD AUTO: 0.64 10*3/MM3 (ref 0.1–0.9)
MONOCYTES NFR BLD AUTO: 7.5 % (ref 5–12)
NEUTROPHILS NFR BLD AUTO: 3.29 10*3/MM3 (ref 1.7–7)
NEUTROPHILS NFR BLD AUTO: 38.4 % (ref 42.7–76)
NRBC BLD AUTO-RTO: 0 /100 WBC (ref 0–0.2)
PLATELET # BLD AUTO: 207 10*3/MM3 (ref 140–450)
PMV BLD AUTO: 9.8 FL (ref 6–12)
POTASSIUM SERPL-SCNC: 4.2 MMOL/L (ref 3.5–5.2)
PROT SERPL-MCNC: 6.7 G/DL (ref 6–8.5)
RBC # BLD AUTO: 4.83 10*6/MM3 (ref 3.77–5.28)
SODIUM SERPL-SCNC: 141 MMOL/L (ref 136–145)
TRIGL SERPL-MCNC: 49 MG/DL (ref 0–150)
VLDLC SERPL-MCNC: 11 MG/DL (ref 5–40)
WBC NRBC COR # BLD AUTO: 8.57 10*3/MM3 (ref 3.4–10.8)

## 2023-12-19 PROCEDURE — 83036 HEMOGLOBIN GLYCOSYLATED A1C: CPT

## 2023-12-19 PROCEDURE — 80053 COMPREHEN METABOLIC PANEL: CPT

## 2023-12-19 PROCEDURE — 80061 LIPID PANEL: CPT

## 2023-12-19 PROCEDURE — 85025 COMPLETE CBC W/AUTO DIFF WBC: CPT

## 2023-12-19 PROCEDURE — 36415 COLL VENOUS BLD VENIPUNCTURE: CPT

## 2023-12-19 PROCEDURE — 82172 ASSAY OF APOLIPOPROTEIN: CPT

## 2023-12-19 PROCEDURE — 82728 ASSAY OF FERRITIN: CPT

## 2023-12-20 ENCOUNTER — OFFICE VISIT (OUTPATIENT)
Dept: INTERNAL MEDICINE | Facility: CLINIC | Age: 38
End: 2023-12-20
Payer: COMMERCIAL

## 2023-12-20 VITALS
SYSTOLIC BLOOD PRESSURE: 110 MMHG | DIASTOLIC BLOOD PRESSURE: 74 MMHG | HEART RATE: 84 BPM | WEIGHT: 195.2 LBS | HEIGHT: 62 IN | BODY MASS INDEX: 35.92 KG/M2 | TEMPERATURE: 98 F

## 2023-12-20 DIAGNOSIS — E78.2 MIXED HYPERLIPIDEMIA: Primary | ICD-10-CM

## 2023-12-20 DIAGNOSIS — F43.10 PTSD (POST-TRAUMATIC STRESS DISORDER): ICD-10-CM

## 2023-12-20 DIAGNOSIS — E83.110 HEREDITARY HEMOCHROMATOSIS: ICD-10-CM

## 2023-12-20 DIAGNOSIS — F41.9 ANXIETY: ICD-10-CM

## 2023-12-20 PROCEDURE — 99214 OFFICE O/P EST MOD 30 MIN: CPT | Performed by: INTERNAL MEDICINE

## 2023-12-20 RX ORDER — DESVENLAFAXINE SUCCINATE 50 MG/1
50 TABLET, EXTENDED RELEASE ORAL DAILY
Qty: 30 TABLET | Refills: 2 | Status: SHIPPED | OUTPATIENT
Start: 2023-12-20

## 2023-12-20 RX ORDER — DESVENLAFAXINE 25 MG/1
25 TABLET, EXTENDED RELEASE ORAL DAILY
Qty: 30 TABLET | Refills: 2 | Status: SHIPPED | OUTPATIENT
Start: 2023-12-20

## 2023-12-20 RX ORDER — LORAZEPAM 0.5 MG/1
0.5 TABLET ORAL EVERY 8 HOURS PRN
Qty: 30 TABLET | Refills: 1 | Status: SHIPPED | OUTPATIENT
Start: 2023-12-20

## 2023-12-20 NOTE — PATIENT INSTRUCTIONS
Reduce pristiq to 75 mg a day.  If you have difficulty, alternate 75 a day with 100 mg pristiq a day.    Once you have been on 75 mg a day for 2-3 weeks and doing ok, reduce to 50 mg.    Contact me when you are on 50 and stable, let me know, and I will start lexapro.    
<<----- Click to add NO significant Past Surgical History
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
## 2023-12-21 LAB — APO B SERPL-MCNC: 63 MG/DL

## 2023-12-24 ENCOUNTER — PATIENT MESSAGE (OUTPATIENT)
Dept: ONCOLOGY | Facility: CLINIC | Age: 38
End: 2023-12-24
Payer: COMMERCIAL

## 2024-01-22 RX ORDER — ESCITALOPRAM OXALATE 10 MG/1
10 TABLET ORAL DAILY
Qty: 90 TABLET | Refills: 1 | Status: SHIPPED | OUTPATIENT
Start: 2024-01-22

## 2024-02-12 RX ORDER — DESVENLAFAXINE 100 MG/1
TABLET, EXTENDED RELEASE ORAL
Qty: 30 TABLET | Refills: 11 | OUTPATIENT
Start: 2024-02-12

## 2024-03-02 DIAGNOSIS — K21.9 GASTROESOPHAGEAL REFLUX DISEASE: ICD-10-CM

## 2024-03-04 RX ORDER — OMEPRAZOLE 20 MG/1
CAPSULE, DELAYED RELEASE ORAL
Qty: 30 CAPSULE | Refills: 5 | Status: SHIPPED | OUTPATIENT
Start: 2024-03-04

## 2024-03-18 RX ORDER — BISOPROLOL FUMARATE 10 MG/1
10 TABLET, FILM COATED ORAL DAILY
Qty: 30 TABLET | Refills: 5 | Status: SHIPPED | OUTPATIENT
Start: 2024-03-18

## 2024-03-28 RX ORDER — ESCITALOPRAM OXALATE 10 MG/1
15 TABLET ORAL DAILY
Qty: 45 TABLET | Refills: 5 | Status: SHIPPED | OUTPATIENT
Start: 2024-03-28

## 2024-03-28 NOTE — TELEPHONE ENCOUNTER
Additional details provided by patient: THE PATIENT STATES THAT HER MEDICATION WAS INCREASE FROM 10MG TO 15 MG SHE NEEDS TO GET A NEW PRESCRIPTION FOR 15MG AND SHE HAS LESS THAN THREE DAYS LEFT      Per TW on 2/27/24 [employer] message:  Why don't you take 1 1/2 tablet of lexapro, which would be 15 mg and see how that does.  Let me know when I need to change the prescription.

## 2024-03-28 NOTE — TELEPHONE ENCOUNTER
Caller: Mickie Hainesflash Bernabe    Relationship: Self    Best call back number: [phone]    Requested Prescriptions:   Requested Prescriptions     Pending Prescriptions Disp Refills    escitalopram (Lexapro) 10 MG tablet 90 tablet 1     Sig: Take 1 tablet by mouth Daily.        Pharmacy where request should be sent: [facility] [address] [phone] [facility] [phone]      Last office visit with prescribing clinician: 12/20/2023   Last telemedicine visit with prescribing clinician: Visit date not found   Next office visit with prescribing clinician: 4/22/2024     Additional details provided by patient: THE PATIENT STATES THAT HER MEDICATION WAS INCREASE FROM 10MG TO 15 MG SHE NEEDS TO GET A NEW PRESCRIPTION FOR 15MG AND SHE HAS LESS THAN THREE DAYS LEFT     Does the patient have less than a 3 day supply:  [x] Yes  [] No    Would you like a call back once the refill request has been completed: [] Yes [x] No    If the office needs to give you a call back, can they leave a voicemail: [] Yes [x] No    Graciela Hewitt Rep   03/28/24 10:47 EDT

## 2024-04-05 RX ORDER — BUSPIRONE HYDROCHLORIDE 30 MG/1
TABLET ORAL
Qty: 60 TABLET | Refills: 5 | Status: SHIPPED | OUTPATIENT
Start: 2024-04-05

## 2024-04-05 NOTE — TELEPHONE ENCOUNTER
Rx Refill Note  Requested Prescriptions     Pending Prescriptions Disp Refills    busPIRone (BUSPAR) 30 MG tablet [Pharmacy Med Name: buspirone 30 mg tablet] 60 tablet 5     Sig: TAKE ONE TABLET BY MOUTH TWICE DAILY      Last office visit with prescribing clinician: 12/20/2023   Last telemedicine visit with prescribing clinician: Visit date not found   Next office visit with prescribing clinician: 4/22/2024                         Would you like a call back once the refill request has been completed: [] Yes [] No    If the office needs to give you a call back, can they leave a voicemail: [] Yes [] No    Rebecca Little MA  04/05/24, 08:54 EDT

## 2024-05-02 DIAGNOSIS — E66.01 MORBID OBESITY: Primary | ICD-10-CM

## 2024-05-02 RX ORDER — PHENTERMINE HYDROCHLORIDE 37.5 MG/1
37.5 TABLET ORAL
Qty: 30 TABLET | Refills: 2 | Status: SHIPPED | OUTPATIENT
Start: 2024-05-02

## 2024-05-02 RX ORDER — ESCITALOPRAM OXALATE 10 MG/1
15 TABLET ORAL DAILY
Qty: 45 TABLET | Refills: 5 | Status: SHIPPED | OUTPATIENT
Start: 2024-05-02

## 2024-05-21 DIAGNOSIS — E83.110 HEREDITARY HEMOCHROMATOSIS: Primary | ICD-10-CM

## 2024-05-22 ENCOUNTER — TELEPHONE (OUTPATIENT)
Dept: ONCOLOGY | Facility: CLINIC | Age: 39
End: 2024-05-22

## 2024-05-22 NOTE — TELEPHONE ENCOUNTER
Caller: Cristina Haines    Relationship to patient: Self    Best call back number: [phone]    Chief complaint: RESCHEDULE     Type of visit: FOLLOW UP, PHLEBO     Requested date: 6-10 AFTERNOON, [place]    If rescheduling, when is the original appointment: 5-30     Additional notes:PLEASE ADVISE

## 2024-05-23 ENCOUNTER — TELEPHONE (OUTPATIENT)
Dept: ONCOLOGY | Facility: CLINIC | Age: 39
End: 2024-05-23

## 2024-05-23 NOTE — TELEPHONE ENCOUNTER
Caller: Cristina Haines    Relationship to patient: Self    Best call back number: [phone]    Chief complaint: CANCEL - R/S     Type of visit: F/U & PHLEBOTOMY     Requested date: 6/12/24 LATE AFTERNOON     If rescheduling, when is the original appointment: 5/30/24

## 2024-06-10 ENCOUNTER — OFFICE VISIT (OUTPATIENT)
Dept: ONCOLOGY | Facility: CLINIC | Age: 39
End: 2024-06-10
Payer: COMMERCIAL

## 2024-06-10 ENCOUNTER — APPOINTMENT (OUTPATIENT)
Dept: ONCOLOGY | Facility: HOSPITAL | Age: 39
End: 2024-06-10
Payer: COMMERCIAL

## 2024-06-10 ENCOUNTER — LAB (OUTPATIENT)
Dept: LAB | Facility: HOSPITAL | Age: 39
End: 2024-06-10
Payer: COMMERCIAL

## 2024-06-10 VITALS
SYSTOLIC BLOOD PRESSURE: 116 MMHG | HEART RATE: 83 BPM | WEIGHT: 196.5 LBS | BODY MASS INDEX: 36.16 KG/M2 | OXYGEN SATURATION: 99 % | TEMPERATURE: 98 F | HEIGHT: 62 IN | DIASTOLIC BLOOD PRESSURE: 66 MMHG | RESPIRATION RATE: 18 BRPM

## 2024-06-10 DIAGNOSIS — E83.110 HEREDITARY HEMOCHROMATOSIS: ICD-10-CM

## 2024-06-10 DIAGNOSIS — E83.110 HEREDITARY HEMOCHROMATOSIS: Primary | ICD-10-CM

## 2024-06-10 LAB
BASOPHILS # BLD AUTO: 0.05 10*3/MM3 (ref 0–0.2)
BASOPHILS NFR BLD AUTO: 0.7 % (ref 0–1.5)
DEPRECATED RDW RBC AUTO: 39.8 FL (ref 37–54)
EOSINOPHIL # BLD AUTO: 0.16 10*3/MM3 (ref 0–0.4)
EOSINOPHIL NFR BLD AUTO: 2.4 % (ref 0.3–6.2)
ERYTHROCYTE [DISTWIDTH] IN BLOOD BY AUTOMATED COUNT: 12.1 % (ref 12.3–15.4)
FERRITIN SERPL-MCNC: 45.3 NG/ML (ref 13–150)
HCT VFR BLD AUTO: 42.8 % (ref 34–46.6)
HGB BLD-MCNC: 14.6 G/DL (ref 12–15.9)
IMM GRANULOCYTES # BLD AUTO: 0.01 10*3/MM3 (ref 0–0.05)
IMM GRANULOCYTES NFR BLD AUTO: 0.1 % (ref 0–0.5)
LYMPHOCYTES # BLD AUTO: 3.26 10*3/MM3 (ref 0.7–3.1)
LYMPHOCYTES NFR BLD AUTO: 48.2 % (ref 19.6–45.3)
MCH RBC QN AUTO: 30.5 PG (ref 26.6–33)
MCHC RBC AUTO-ENTMCNC: 34.1 G/DL (ref 31.5–35.7)
MCV RBC AUTO: 89.5 FL (ref 79–97)
MONOCYTES # BLD AUTO: 0.54 10*3/MM3 (ref 0.1–0.9)
MONOCYTES NFR BLD AUTO: 8 % (ref 5–12)
NEUTROPHILS NFR BLD AUTO: 2.74 10*3/MM3 (ref 1.7–7)
NEUTROPHILS NFR BLD AUTO: 40.6 % (ref 42.7–76)
NRBC BLD AUTO-RTO: 0 /100 WBC (ref 0–0.2)
PLATELET # BLD AUTO: 173 10*3/MM3 (ref 140–450)
PMV BLD AUTO: 10.1 FL (ref 6–12)
RBC # BLD AUTO: 4.78 10*6/MM3 (ref 3.77–5.28)
WBC NRBC COR # BLD AUTO: 6.76 10*3/MM3 (ref 3.4–10.8)

## 2024-06-10 PROCEDURE — 82728 ASSAY OF FERRITIN: CPT

## 2024-06-10 PROCEDURE — 85025 COMPLETE CBC W/AUTO DIFF WBC: CPT

## 2024-06-10 PROCEDURE — 99214 OFFICE O/P EST MOD 30 MIN: CPT | Performed by: NURSE PRACTITIONER

## 2024-06-10 PROCEDURE — 36415 COLL VENOUS BLD VENIPUNCTURE: CPT

## 2024-06-10 RX ORDER — METRONIDAZOLE 7.5 MG/G
GEL TOPICAL
COMMUNITY
Start: 2024-05-06
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
## 2024-06-24 ENCOUNTER — OFFICE VISIT (OUTPATIENT)
Dept: INTERNAL MEDICINE | Facility: CLINIC | Age: 39
End: 2024-06-24
Payer: COMMERCIAL

## 2024-06-24 ENCOUNTER — LAB (OUTPATIENT)
Dept: LAB | Facility: HOSPITAL | Age: 39
End: 2024-06-24
Payer: COMMERCIAL

## 2024-06-24 VITALS
OXYGEN SATURATION: 97 % | TEMPERATURE: 99.1 F | HEART RATE: 60 BPM | WEIGHT: 195.4 LBS | SYSTOLIC BLOOD PRESSURE: 100 MMHG | HEIGHT: 62 IN | BODY MASS INDEX: 35.96 KG/M2 | DIASTOLIC BLOOD PRESSURE: 74 MMHG

## 2024-06-24 DIAGNOSIS — R73.09 ABNORMAL GLUCOSE: ICD-10-CM

## 2024-06-24 DIAGNOSIS — Z01.818 PREOP EXAMINATION: ICD-10-CM

## 2024-06-24 DIAGNOSIS — E78.2 MIXED HYPERLIPIDEMIA: Primary | ICD-10-CM

## 2024-06-24 DIAGNOSIS — F32.5 MAJOR DEPRESSIVE DISORDER WITH SINGLE EPISODE, IN FULL REMISSION: ICD-10-CM

## 2024-06-24 DIAGNOSIS — F43.10 PTSD (POST-TRAUMATIC STRESS DISORDER): ICD-10-CM

## 2024-06-24 DIAGNOSIS — Z13.0 SCREENING FOR DEFICIENCY ANEMIA: ICD-10-CM

## 2024-06-24 DIAGNOSIS — R63.5 WEIGHT GAIN: ICD-10-CM

## 2024-06-24 DIAGNOSIS — I47.11 INAPPROPRIATE SINUS TACHYCARDIA: ICD-10-CM

## 2024-06-24 LAB
APTT PPP: 31.2 SECONDS (ref 22–39)
BASOPHILS # BLD AUTO: 0.04 10*3/MM3 (ref 0–0.2)
BASOPHILS NFR BLD AUTO: 0.6 % (ref 0–1.5)
DEPRECATED RDW RBC AUTO: 40.3 FL (ref 37–54)
EOSINOPHIL # BLD AUTO: 0.12 10*3/MM3 (ref 0–0.4)
EOSINOPHIL NFR BLD AUTO: 1.8 % (ref 0.3–6.2)
ERYTHROCYTE [DISTWIDTH] IN BLOOD BY AUTOMATED COUNT: 12.3 % (ref 12.3–15.4)
HBA1C MFR BLD: 5.6 % (ref 4.8–5.6)
HCT VFR BLD AUTO: 44.1 % (ref 34–46.6)
HGB BLD-MCNC: 14.7 G/DL (ref 12–15.9)
IMM GRANULOCYTES # BLD AUTO: 0.02 10*3/MM3 (ref 0–0.05)
IMM GRANULOCYTES NFR BLD AUTO: 0.3 % (ref 0–0.5)
INR PPP: 0.94 (ref 0.89–1.12)
LYMPHOCYTES # BLD AUTO: 3.3 10*3/MM3 (ref 0.7–3.1)
LYMPHOCYTES NFR BLD AUTO: 50.5 % (ref 19.6–45.3)
MCH RBC QN AUTO: 30.2 PG (ref 26.6–33)
MCHC RBC AUTO-ENTMCNC: 33.3 G/DL (ref 31.5–35.7)
MCV RBC AUTO: 90.6 FL (ref 79–97)
MONOCYTES # BLD AUTO: 0.56 10*3/MM3 (ref 0.1–0.9)
MONOCYTES NFR BLD AUTO: 8.6 % (ref 5–12)
NEUTROPHILS NFR BLD AUTO: 2.49 10*3/MM3 (ref 1.7–7)
NEUTROPHILS NFR BLD AUTO: 38.2 % (ref 42.7–76)
NRBC BLD AUTO-RTO: 0 /100 WBC (ref 0–0.2)
PLATELET # BLD AUTO: 194 10*3/MM3 (ref 140–450)
PMV BLD AUTO: 10.2 FL (ref 6–12)
PROTHROMBIN TIME: 12.6 SECONDS (ref 12.2–14.5)
RBC # BLD AUTO: 4.87 10*6/MM3 (ref 3.77–5.28)
WBC NRBC COR # BLD AUTO: 6.53 10*3/MM3 (ref 3.4–10.8)

## 2024-06-24 PROCEDURE — 85025 COMPLETE CBC W/AUTO DIFF WBC: CPT

## 2024-06-24 PROCEDURE — 85610 PROTHROMBIN TIME: CPT

## 2024-06-24 PROCEDURE — 80048 BASIC METABOLIC PNL TOTAL CA: CPT

## 2024-06-24 PROCEDURE — 99214 OFFICE O/P EST MOD 30 MIN: CPT | Performed by: INTERNAL MEDICINE

## 2024-06-24 PROCEDURE — 84443 ASSAY THYROID STIM HORMONE: CPT

## 2024-06-24 PROCEDURE — 93000 ELECTROCARDIOGRAM COMPLETE: CPT | Performed by: INTERNAL MEDICINE

## 2024-06-24 PROCEDURE — 83036 HEMOGLOBIN GLYCOSYLATED A1C: CPT

## 2024-06-24 PROCEDURE — 36415 COLL VENOUS BLD VENIPUNCTURE: CPT

## 2024-06-24 PROCEDURE — 85730 THROMBOPLASTIN TIME PARTIAL: CPT

## 2024-06-24 RX ORDER — ESCITALOPRAM OXALATE 20 MG/1
20 TABLET ORAL DAILY
Qty: 90 TABLET | Refills: 3 | Status: SHIPPED | OUTPATIENT
Start: 2024-06-24
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
## 2024-06-25 LAB
ANION GAP SERPL CALCULATED.3IONS-SCNC: 10.2 MMOL/L (ref 5–15)
BUN SERPL-MCNC: 12 MG/DL (ref 6–20)
BUN/CREAT SERPL: 16.9 (ref 7–25)
CALCIUM SPEC-SCNC: 9.4 MG/DL (ref 8.6–10.5)
CHLORIDE SERPL-SCNC: 102 MMOL/L (ref 98–107)
CO2 SERPL-SCNC: 27.8 MMOL/L (ref 22–29)
CREAT SERPL-MCNC: 0.71 MG/DL (ref 0.57–1)
EGFRCR SERPLBLD CKD-EPI 2021: 111.1 ML/MIN/1.73
GLUCOSE SERPL-MCNC: 95 MG/DL (ref 65–99)
POTASSIUM SERPL-SCNC: 4.2 MMOL/L (ref 3.5–5.2)
SODIUM SERPL-SCNC: 140 MMOL/L (ref 136–145)
TSH SERPL DL<=0.05 MIU/L-ACNC: 1.72 UIU/ML (ref 0.27–4.2)

## 2024-07-01 RX ORDER — ROSUVASTATIN CALCIUM 10 MG/1
10 TABLET, COATED ORAL DAILY
Qty: 90 TABLET | Refills: 3 | Status: SHIPPED | OUTPATIENT
Start: 2024-07-01

## 2024-07-10 ENCOUNTER — TELEPHONE (OUTPATIENT)
Dept: OBSTETRICS AND GYNECOLOGY | Facility: CLINIC | Age: 39
End: 2024-07-10

## 2024-07-10 NOTE — TELEPHONE ENCOUNTER
Provider: DR HIRA MACHADO    Caller: TARA SIDHU      Phone Number: [phone]    Reason for Call: SAME DAY CANCELLATION FOR ANNUAL @ 1PM -   PT HAS A MIGRAINE AND WILL CALL BACK TO R/S

## 2024-09-12 ENCOUNTER — LAB (OUTPATIENT)
Dept: LAB | Facility: HOSPITAL | Age: 39
End: 2024-09-12
Payer: COMMERCIAL

## 2024-09-12 DIAGNOSIS — E83.110 HEREDITARY HEMOCHROMATOSIS: ICD-10-CM

## 2024-09-12 LAB
ALBUMIN SERPL-MCNC: 4.4 G/DL (ref 3.5–5.2)
ALBUMIN/GLOB SERPL: 1.7 G/DL
ALP SERPL-CCNC: 59 U/L (ref 39–117)
ALT SERPL W P-5'-P-CCNC: 16 U/L (ref 1–33)
ANION GAP SERPL CALCULATED.3IONS-SCNC: 9.4 MMOL/L (ref 5–15)
AST SERPL-CCNC: 19 U/L (ref 1–32)
BASOPHILS # BLD AUTO: 0.05 10*3/MM3 (ref 0–0.2)
BASOPHILS NFR BLD AUTO: 0.5 % (ref 0–1.5)
BILIRUB SERPL-MCNC: 0.4 MG/DL (ref 0–1.2)
BUN SERPL-MCNC: 17 MG/DL (ref 6–20)
BUN/CREAT SERPL: 23.3 (ref 7–25)
CALCIUM SPEC-SCNC: 8.8 MG/DL (ref 8.6–10.5)
CHLORIDE SERPL-SCNC: 104 MMOL/L (ref 98–107)
CO2 SERPL-SCNC: 24.6 MMOL/L (ref 22–29)
CREAT SERPL-MCNC: 0.73 MG/DL (ref 0.57–1)
DEPRECATED RDW RBC AUTO: 40.8 FL (ref 37–54)
EGFRCR SERPLBLD CKD-EPI 2021: 107.4 ML/MIN/1.73
EOSINOPHIL # BLD AUTO: 0.21 10*3/MM3 (ref 0–0.4)
EOSINOPHIL NFR BLD AUTO: 2 % (ref 0.3–6.2)
ERYTHROCYTE [DISTWIDTH] IN BLOOD BY AUTOMATED COUNT: 12.4 % (ref 12.3–15.4)
FERRITIN SERPL-MCNC: 52.04 NG/ML (ref 13–150)
GLOBULIN UR ELPH-MCNC: 2.6 GM/DL
GLUCOSE SERPL-MCNC: 89 MG/DL (ref 65–99)
HCT VFR BLD AUTO: 44.9 % (ref 34–46.6)
HGB BLD-MCNC: 15.4 G/DL (ref 12–15.9)
IMM GRANULOCYTES # BLD AUTO: 0.02 10*3/MM3 (ref 0–0.05)
IMM GRANULOCYTES NFR BLD AUTO: 0.2 % (ref 0–0.5)
LYMPHOCYTES # BLD AUTO: 5.04 10*3/MM3 (ref 0.7–3.1)
LYMPHOCYTES NFR BLD AUTO: 46.9 % (ref 19.6–45.3)
MCH RBC QN AUTO: 30.6 PG (ref 26.6–33)
MCHC RBC AUTO-ENTMCNC: 34.3 G/DL (ref 31.5–35.7)
MCV RBC AUTO: 89.1 FL (ref 79–97)
MONOCYTES # BLD AUTO: 0.92 10*3/MM3 (ref 0.1–0.9)
MONOCYTES NFR BLD AUTO: 8.6 % (ref 5–12)
NEUTROPHILS NFR BLD AUTO: 4.51 10*3/MM3 (ref 1.7–7)
NEUTROPHILS NFR BLD AUTO: 41.8 % (ref 42.7–76)
NRBC BLD AUTO-RTO: 0 /100 WBC (ref 0–0.2)
PLATELET # BLD AUTO: 210 10*3/MM3 (ref 140–450)
PMV BLD AUTO: 9.9 FL (ref 6–12)
POTASSIUM SERPL-SCNC: 3.9 MMOL/L (ref 3.5–5.2)
PROT SERPL-MCNC: 7 G/DL (ref 6–8.5)
RBC # BLD AUTO: 5.04 10*6/MM3 (ref 3.77–5.28)
SODIUM SERPL-SCNC: 138 MMOL/L (ref 136–145)
WBC NRBC COR # BLD AUTO: 10.75 10*3/MM3 (ref 3.4–10.8)

## 2024-09-12 PROCEDURE — 85025 COMPLETE CBC W/AUTO DIFF WBC: CPT

## 2024-09-12 PROCEDURE — 80053 COMPREHEN METABOLIC PANEL: CPT

## 2024-09-12 PROCEDURE — 36415 COLL VENOUS BLD VENIPUNCTURE: CPT

## 2024-09-12 PROCEDURE — 82728 ASSAY OF FERRITIN: CPT

## 2024-09-13 DIAGNOSIS — K21.9 GASTROESOPHAGEAL REFLUX DISEASE: ICD-10-CM

## 2024-09-16 RX ORDER — BISOPROLOL FUMARATE 10 MG/1
10 TABLET, FILM COATED ORAL DAILY
Qty: 30 TABLET | Refills: 5 | Status: SHIPPED | OUTPATIENT
Start: 2024-09-16

## 2024-09-18 ENCOUNTER — OFFICE VISIT (OUTPATIENT)
Dept: OBSTETRICS AND GYNECOLOGY | Facility: CLINIC | Age: 39
End: 2024-09-18
Payer: COMMERCIAL

## 2024-09-18 VITALS
WEIGHT: 197.8 LBS | SYSTOLIC BLOOD PRESSURE: 100 MMHG | HEART RATE: 84 BPM | DIASTOLIC BLOOD PRESSURE: 78 MMHG | HEIGHT: 62 IN | BODY MASS INDEX: 36.4 KG/M2

## 2024-09-18 DIAGNOSIS — Z01.419 WELL WOMAN EXAM: Primary | ICD-10-CM

## 2024-09-18 DIAGNOSIS — Z87.410 HISTORY OF CERVICAL DYSPLASIA: ICD-10-CM

## 2024-09-25 LAB — REF LAB TEST METHOD: NORMAL

## 2024-10-21 DIAGNOSIS — E66.01 MORBID OBESITY: Primary | ICD-10-CM

## 2024-10-21 RX ORDER — PHENTERMINE HYDROCHLORIDE 37.5 MG/1
37.5 CAPSULE ORAL EVERY MORNING
Qty: 30 CAPSULE | Refills: 2 | Status: SHIPPED | OUTPATIENT
Start: 2024-10-21

## 2024-11-04 ENCOUNTER — OFFICE VISIT (OUTPATIENT)
Dept: CARDIOLOGY | Facility: CLINIC | Age: 39
End: 2024-11-04
Payer: COMMERCIAL

## 2024-11-04 VITALS
DIASTOLIC BLOOD PRESSURE: 74 MMHG | BODY MASS INDEX: 35.92 KG/M2 | SYSTOLIC BLOOD PRESSURE: 104 MMHG | WEIGHT: 195.2 LBS | HEART RATE: 78 BPM | HEIGHT: 62 IN | OXYGEN SATURATION: 100 %

## 2024-11-04 DIAGNOSIS — I47.11 INAPPROPRIATE SINUS TACHYCARDIA: Primary | ICD-10-CM

## 2024-11-04 DIAGNOSIS — F43.10 PTSD (POST-TRAUMATIC STRESS DISORDER): ICD-10-CM

## 2024-11-04 PROCEDURE — 99213 OFFICE O/P EST LOW 20 MIN: CPT | Performed by: PHYSICIAN ASSISTANT

## 2024-11-04 NOTE — PROGRESS NOTES
[facility]   OFFICE NOTE      Cristina Haines  1985  PCP: Rahat Mercedes MD    SUBJECTIVE:   Cristina Haines is a 38 y.o. female seen for a follow up visit regarding the following:     CC:VVS    HPI:   38 year old presents for follow up regarding VVS, IST, HTN.  Since we last saw the pt, pt denies any sustained palpitation episodes, worsening SOB, CP, LH, and dizziness. Denies any hospitalizations, ER visits, bleeding, or TIA/CVA symptoms. Overall feels well. She is doing quite well since last seen by our office.  She is having surgery coming up this week with breast reduction, and implants..   Cardiac PMH: (Old records have been reviewed and summarized below)  1.  Vasovagal syncope:               A.  Echocardiogram, 04/12/2007, showing trace MR, TR, EF 60%.              B.  Echocardiogram, March 2008: Normal LV size and function.               C.  Echocardiogram, November 2013: EF 60% to 65%, trace MR.    D. Echocardiogram Normal EF 65%. No significant VHD. 12/2021.   2.  Inappropriate sinus tachycardia:               A.  Electrophysiology study with radiofrequency ablation, June 2011 at [facility] in [place], [place], no data.               B.  Echocardiogram February 2016: EF 60% to 65%, impaired relaxation noted. Trace MR, trace TR.    3.  Essential Hypertension.    4. Recent COVID 1/2022,   4.  Migraine headaches.   5.  Probable fibromyalgia.   6.  Hereditary hemochromatosis, status post liver biopsy.   7.  Depression.   8.  Dyslipidemia  9.  Status post hysterectomy, August 2013.          Past Medical History, Past Surgical History, Family history, Social History, and Medications were all reviewed with the patient today and updated as necessary.       Current Outpatient Medications:     bisoprolol (ZEBeta) 10 MG tablet, TAKE ONE TABLET BY MOUTH EVERY DAY, Disp: 30 tablet, Rfl: 5    cetirizine (zyrTEC) 10 MG tablet, Take 1 tablet by mouth  "Daily., Disp: , Rfl:     cholecalciferol (VITAMIN D3) 25 MCG (1000 UT) tablet, Take 1 tablet by mouth Daily., Disp: , Rfl:     escitalopram (Lexapro) 20 MG tablet, Take 1 tablet by mouth Daily., Disp: 90 tablet, Rfl: 3    LORazepam (Ativan) 0.5 MG tablet, Take 1 tablet by mouth Every 8 (Eight) Hours As Needed for Anxiety., Disp: 30 tablet, Rfl: 1    Melatonin 10 MG tablet, Take 1 tablet by mouth At Night As Needed., Disp: , Rfl:     Multiple Vitamin (MULTI-VITAMIN DAILY PO), Take 1 tablet by mouth Daily As Needed., Disp: , Rfl:     omeprazole (priLOSEC) 20 MG capsule, TAKE ONE CAPSULE BY MOUTH EVERY DAY, Disp: 30 capsule, Rfl: 5    rosuvastatin (CRESTOR) 10 MG tablet, TAKE ONE TABLET BY MOUTH EVERY DAY, Disp: 90 tablet, Rfl: 3    busPIRone (BUSPAR) 30 MG tablet, TAKE ONE TABLET BY MOUTH TWICE DAILY (Patient not taking: Reported on 11/4/2024), Disp: 60 tablet, Rfl: 5    phentermine 37.5 MG capsule, Take 1 capsule by mouth Every Morning. (Patient not taking: Reported on 11/4/2024), Disp: 30 capsule, Rfl: 2      No Known Allergies      PHYSICAL EXAM:    /74 (BP Location: Right arm, Patient Position: Sitting)   Pulse 78   Ht 157.5 cm (62\")   Wt 88.5 kg (195 lb 3.2 oz)   LMP  (LMP Unknown)   SpO2 100%   BMI 35.70 kg/m²        Wt Readings from Last 5 Encounters:   11/04/24 88.5 kg (195 lb 3.2 oz)   09/18/24 89.7 kg (197 lb 12.8 oz)   06/24/24 88.6 kg (195 lb 6.4 oz)   06/10/24 89.1 kg (196 lb 8 oz)   01/22/24 88.5 kg (195 lb)       BP Readings from Last 5 Encounters:   11/04/24 104/74   09/18/24 100/78   06/24/24 100/74   06/10/24 116/66   01/22/24 94/60       General appearance - Alert, well appearing, and in no distress   Mental status - Affect appropriate to mood.  Eyes - Sclerae anicteric,  ENMT - Hearing grossly normal bilaterally, Dental hygiene good.  Neck - Carotids upstroke normal bilaterally, no bruits, no JVD.  Resp - Clear to auscultation, no wheezes, rales or rhonchi, symmetric air entry.  Heart - " Normal rate, regular rhythm, normal S1, S2, no murmurs, rubs, clicks or gallops.  GI - Soft, nontender, nondistended, no masses or organomegaly.  Neurological - Grossly intact - normal speech, no focal findings  Musculoskeletal - No joint tenderness, deformity or swelling, no muscular tenderness noted.  Extremities - Peripheral pulses normal, no pedal edema, no clubbing or cyanosis.  Skin - Normal coloration and turgor.  Psych -  oriented to person, place, and time.    Medical problems and test results were reviewed with the patient today.         Procedures      ASSESSMENT   1. IST, s/p remote RFA 2011. Controlled Zebeta.   2. VVS: aggressive hydration, exercise.   3. HTN: Controlled on current medication.      PLAN  Follow up one year .    11/4/2024  13:31 EST  Electronically signed by TOMMY Wu, 11/04/24, 1:38 PM EST.

## 2024-12-05 ENCOUNTER — LAB (OUTPATIENT)
Dept: LAB | Facility: HOSPITAL | Age: 39
End: 2024-12-05
Payer: COMMERCIAL

## 2024-12-05 DIAGNOSIS — E83.110 HEREDITARY HEMOCHROMATOSIS: Primary | ICD-10-CM

## 2024-12-05 DIAGNOSIS — E83.110 HEREDITARY HEMOCHROMATOSIS: ICD-10-CM

## 2024-12-05 LAB
ALBUMIN SERPL-MCNC: 4.3 G/DL (ref 3.5–5.2)
ALBUMIN/GLOB SERPL: 1.7 G/DL
ALP SERPL-CCNC: 68 U/L (ref 39–117)
ALT SERPL W P-5'-P-CCNC: 15 U/L (ref 1–33)
ANION GAP SERPL CALCULATED.3IONS-SCNC: 15.4 MMOL/L (ref 5–15)
AST SERPL-CCNC: 19 U/L (ref 1–32)
BASOPHILS # BLD AUTO: 0.03 10*3/MM3 (ref 0–0.2)
BASOPHILS NFR BLD AUTO: 0.5 % (ref 0–1.5)
BILIRUB SERPL-MCNC: 0.5 MG/DL (ref 0–1.2)
BUN SERPL-MCNC: 12 MG/DL (ref 6–20)
BUN/CREAT SERPL: 16 (ref 7–25)
CALCIUM SPEC-SCNC: 9.4 MG/DL (ref 8.6–10.5)
CHLORIDE SERPL-SCNC: 98 MMOL/L (ref 98–107)
CO2 SERPL-SCNC: 26.6 MMOL/L (ref 22–29)
CREAT SERPL-MCNC: 0.75 MG/DL (ref 0.57–1)
DEPRECATED RDW RBC AUTO: 41.1 FL (ref 37–54)
EGFRCR SERPLBLD CKD-EPI 2021: 103.4 ML/MIN/1.73
EOSINOPHIL # BLD AUTO: 0.19 10*3/MM3 (ref 0–0.4)
EOSINOPHIL NFR BLD AUTO: 2.9 % (ref 0.3–6.2)
ERYTHROCYTE [DISTWIDTH] IN BLOOD BY AUTOMATED COUNT: 12.6 % (ref 12.3–15.4)
FERRITIN SERPL-MCNC: 73.3 NG/ML (ref 13–150)
GLOBULIN UR ELPH-MCNC: 2.6 GM/DL
GLUCOSE SERPL-MCNC: 108 MG/DL (ref 65–99)
HCT VFR BLD AUTO: 42.9 % (ref 34–46.6)
HGB BLD-MCNC: 14.5 G/DL (ref 12–15.9)
IMM GRANULOCYTES # BLD AUTO: 0.02 10*3/MM3 (ref 0–0.05)
IMM GRANULOCYTES NFR BLD AUTO: 0.3 % (ref 0–0.5)
IRON 24H UR-MRATE: 99 MCG/DL (ref 37–145)
IRON SATN MFR SERPL: 24 % (ref 20–50)
LYMPHOCYTES # BLD AUTO: 3 10*3/MM3 (ref 0.7–3.1)
LYMPHOCYTES NFR BLD AUTO: 45.5 % (ref 19.6–45.3)
MCH RBC QN AUTO: 30.4 PG (ref 26.6–33)
MCHC RBC AUTO-ENTMCNC: 33.8 G/DL (ref 31.5–35.7)
MCV RBC AUTO: 89.9 FL (ref 79–97)
MONOCYTES # BLD AUTO: 0.48 10*3/MM3 (ref 0.1–0.9)
MONOCYTES NFR BLD AUTO: 7.3 % (ref 5–12)
NEUTROPHILS NFR BLD AUTO: 2.88 10*3/MM3 (ref 1.7–7)
NEUTROPHILS NFR BLD AUTO: 43.5 % (ref 42.7–76)
NRBC BLD AUTO-RTO: 0 /100 WBC (ref 0–0.2)
PLATELET # BLD AUTO: 185 10*3/MM3 (ref 140–450)
PMV BLD AUTO: 9.9 FL (ref 6–12)
POTASSIUM SERPL-SCNC: 4.5 MMOL/L (ref 3.5–5.2)
PROT SERPL-MCNC: 6.9 G/DL (ref 6–8.5)
RBC # BLD AUTO: 4.77 10*6/MM3 (ref 3.77–5.28)
SODIUM SERPL-SCNC: 140 MMOL/L (ref 136–145)
TIBC SERPL-MCNC: 414 MCG/DL (ref 298–536)
TRANSFERRIN SERPL-MCNC: 278 MG/DL (ref 200–360)
WBC NRBC COR # BLD AUTO: 6.6 10*3/MM3 (ref 3.4–10.8)

## 2024-12-05 PROCEDURE — 84466 ASSAY OF TRANSFERRIN: CPT

## 2024-12-05 PROCEDURE — 82728 ASSAY OF FERRITIN: CPT

## 2024-12-05 PROCEDURE — 85025 COMPLETE CBC W/AUTO DIFF WBC: CPT

## 2024-12-05 PROCEDURE — 83540 ASSAY OF IRON: CPT

## 2024-12-05 PROCEDURE — 80053 COMPREHEN METABOLIC PANEL: CPT

## 2024-12-05 PROCEDURE — 36415 COLL VENOUS BLD VENIPUNCTURE: CPT

## 2024-12-09 ENCOUNTER — HOSPITAL ENCOUNTER (OUTPATIENT)
Facility: HOSPITAL | Age: 39
Discharge: HOME | End: 2024-12-09
Admitting: NURSE PRACTITIONER
Payer: COMMERCIAL

## 2024-12-09 ENCOUNTER — OFFICE VISIT (OUTPATIENT)
Dept: ONCOLOGY | Facility: CLINIC | Age: 39
End: 2024-12-09
Payer: COMMERCIAL

## 2024-12-09 VITALS
DIASTOLIC BLOOD PRESSURE: 81 MMHG | SYSTOLIC BLOOD PRESSURE: 118 MMHG | OXYGEN SATURATION: 98 % | WEIGHT: 201.2 LBS | HEART RATE: 86 BPM | BODY MASS INDEX: 37.02 KG/M2 | RESPIRATION RATE: 16 BRPM | HEIGHT: 62 IN | TEMPERATURE: 97.8 F

## 2024-12-09 VITALS
OXYGEN SATURATION: 98 % | DIASTOLIC BLOOD PRESSURE: 72 MMHG | RESPIRATION RATE: 18 BRPM | HEART RATE: 77 BPM | SYSTOLIC BLOOD PRESSURE: 107 MMHG

## 2024-12-09 DIAGNOSIS — E83.110 HEREDITARY HEMOCHROMATOSIS: Primary | ICD-10-CM

## 2024-12-09 PROCEDURE — 99214 OFFICE O/P EST MOD 30 MIN: CPT | Performed by: NURSE PRACTITIONER

## 2024-12-09 PROCEDURE — 96365 THER/PROPH/DIAG IV INF INIT: CPT

## 2024-12-09 PROCEDURE — 99195 PHLEBOTOMY: CPT

## 2024-12-09 PROCEDURE — 25010000002 PROMETHAZINE PER 50 MG: Performed by: NURSE PRACTITIONER

## 2024-12-09 RX ORDER — PROMETHAZINE HYDROCHLORIDE 25 MG/ML
12.5 INJECTION, SOLUTION INTRAMUSCULAR; INTRAVENOUS EVERY 6 HOURS PRN
Status: DISCONTINUED | OUTPATIENT
Start: 2024-12-09 | End: 2024-12-09

## 2024-12-09 RX ORDER — DIAZEPAM 5 MG/1
TABLET ORAL
COMMUNITY
Start: 2024-11-12

## 2024-12-09 RX ADMIN — PROMETHAZINE HYDROCHLORIDE 12.5 MG: 25 INJECTION INTRAMUSCULAR; INTRAVENOUS at 15:15
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
## 2025-02-26 DIAGNOSIS — K21.9 GASTROESOPHAGEAL REFLUX DISEASE: ICD-10-CM

## 2025-02-26 RX ORDER — OMEPRAZOLE 20 MG/1
CAPSULE, DELAYED RELEASE ORAL
Qty: 30 CAPSULE | Refills: 5 | Status: SHIPPED | OUTPATIENT
Start: 2025-02-26

## 2025-02-26 NOTE — TELEPHONE ENCOUNTER
Rx Refill Note  Requested Prescriptions     Pending Prescriptions Disp Refills    omeprazole (priLOSEC) 20 MG capsule [Pharmacy Med Name: omeprazole 20 mg capsule,delayed release] 30 capsule 5     Sig: TAKE ONE CAPSULE BY MOUTH EVERY DAY      Last office visit with prescribing clinician: 6/24/2024   Last telemedicine visit with prescribing clinician: Visit date not found   Next office visit with prescribing clinician: 3/12/2025                         Would you like a call back once the refill request has been completed: [] Yes [] No    If the office needs to give you a call back, can they leave a voicemail: [] Yes [] No    Betsy Olmos MA  02/26/25, 08:29 EST

## 2025-03-13 RX ORDER — BISOPROLOL FUMARATE 10 MG/1
10 TABLET, FILM COATED ORAL DAILY
Qty: 30 TABLET | Refills: 5 | Status: SHIPPED | OUTPATIENT
Start: 2025-03-13

## 2025-03-13 NOTE — TELEPHONE ENCOUNTER
Rx Refill Note  Requested Prescriptions     Pending Prescriptions Disp Refills    bisoprolol (ZEBeta) 10 MG tablet [Pharmacy Med Name: bisoprolol fumarate 10 mg tablet] 30 tablet 5     Sig: TAKE ONE TABLET BY MOUTH EVERY DAY      Last office visit with prescribing clinician: 6/24/2024   Last telemedicine visit with prescribing clinician: Visit date not found   Next office visit with prescribing clinician: 4/24/2025                         Would you like a call back once the refill request has been completed: [] Yes [] No    If the office needs to give you a call back, can they leave a voicemail: [] Yes [] No    Betsy Olmos MA  03/13/25, 08:30 EDT

## 2025-03-24 ENCOUNTER — PATIENT MESSAGE (OUTPATIENT)
Dept: ONCOLOGY | Facility: CLINIC | Age: 40
End: 2025-03-24
Payer: COMMERCIAL

## 2025-04-28 DIAGNOSIS — E66.01 MORBID (SEVERE) OBESITY DUE TO EXCESS CALORIES: Primary | ICD-10-CM

## 2025-04-28 RX ORDER — SCOPOLAMINE 1 MG/3D
1 PATCH, EXTENDED RELEASE TRANSDERMAL
Qty: 4 EACH | Refills: 0 | Status: SHIPPED | OUTPATIENT
Start: 2025-04-28

## 2025-04-28 RX ORDER — PHENTERMINE HYDROCHLORIDE 37.5 MG/1
37.5 CAPSULE ORAL EVERY MORNING
Qty: 30 CAPSULE | Refills: 2 | Status: SHIPPED | OUTPATIENT
Start: 2025-04-28 | End: 2025-04-30

## 2025-04-28 RX ORDER — ONDANSETRON 4 MG/1
4 TABLET, FILM COATED ORAL EVERY 8 HOURS PRN
Qty: 30 TABLET | Refills: 0 | Status: SHIPPED | OUTPATIENT
Start: 2025-04-28

## 2025-04-30 ENCOUNTER — TELEPHONE (OUTPATIENT)
Dept: INTERNAL MEDICINE | Facility: CLINIC | Age: 40
End: 2025-04-30
Payer: COMMERCIAL

## 2025-04-30 DIAGNOSIS — E66.01 MORBID (SEVERE) OBESITY DUE TO EXCESS CALORIES: ICD-10-CM

## 2025-04-30 RX ORDER — PHENTERMINE HYDROCHLORIDE 37.5 MG/1
37.5 TABLET ORAL
Qty: 30 TABLET | Refills: 2 | Status: SHIPPED | OUTPATIENT
Start: 2025-04-30

## 2025-04-30 NOTE — TELEPHONE ENCOUNTER
Caller: [facility] [address] - [phone]  - [phone] FX    Relationship: Pharmacy    Best call back number: [phone]     Which medication are you concerned about: phentermine 37.5 MG capsule     What are your concerns: PRESCRIPTION WRITTEN FOR CAPSULES AND PATIENT WANTS TABLETS IN THE SAME STRENGTH. PHARMACY IS REQUESTING A NEW PRESCRIPTION OR PERMISSION TO CHANGE THE PRESCRIPTION TO TABLETS. PLEASE CALL AND ADVISE PHARMACY.

## 2025-05-01 ENCOUNTER — LAB (OUTPATIENT)
Dept: LAB | Facility: HOSPITAL | Age: 40
End: 2025-05-01
Payer: COMMERCIAL

## 2025-05-01 DIAGNOSIS — E83.110 HEREDITARY HEMOCHROMATOSIS: ICD-10-CM

## 2025-05-01 LAB
BASOPHILS # BLD AUTO: 0.06 10*3/MM3 (ref 0–0.2)
BASOPHILS NFR BLD AUTO: 0.8 % (ref 0–1.5)
DEPRECATED RDW RBC AUTO: 40.5 FL (ref 37–54)
EOSINOPHIL # BLD AUTO: 0.15 10*3/MM3 (ref 0–0.4)
EOSINOPHIL NFR BLD AUTO: 2.1 % (ref 0.3–6.2)
ERYTHROCYTE [DISTWIDTH] IN BLOOD BY AUTOMATED COUNT: 12.5 % (ref 12.3–15.4)
FERRITIN SERPL-MCNC: 30.11 NG/ML (ref 13–150)
HCT VFR BLD AUTO: 42 % (ref 34–46.6)
HGB BLD-MCNC: 14.3 G/DL (ref 12–15.9)
IMM GRANULOCYTES # BLD AUTO: 0.02 10*3/MM3 (ref 0–0.05)
IMM GRANULOCYTES NFR BLD AUTO: 0.3 % (ref 0–0.5)
IRON 24H UR-MRATE: 79 MCG/DL (ref 37–145)
IRON SATN MFR SERPL: 20 % (ref 20–50)
LYMPHOCYTES # BLD AUTO: 3.14 10*3/MM3 (ref 0.7–3.1)
LYMPHOCYTES NFR BLD AUTO: 43.9 % (ref 19.6–45.3)
MCH RBC QN AUTO: 30 PG (ref 26.6–33)
MCHC RBC AUTO-ENTMCNC: 34 G/DL (ref 31.5–35.7)
MCV RBC AUTO: 88.1 FL (ref 79–97)
MONOCYTES # BLD AUTO: 0.5 10*3/MM3 (ref 0.1–0.9)
MONOCYTES NFR BLD AUTO: 7 % (ref 5–12)
NEUTROPHILS NFR BLD AUTO: 3.28 10*3/MM3 (ref 1.7–7)
NEUTROPHILS NFR BLD AUTO: 45.9 % (ref 42.7–76)
NRBC BLD AUTO-RTO: 0 /100 WBC (ref 0–0.2)
PLATELET # BLD AUTO: 211 10*3/MM3 (ref 140–450)
PMV BLD AUTO: 9.9 FL (ref 6–12)
RBC # BLD AUTO: 4.77 10*6/MM3 (ref 3.77–5.28)
TIBC SERPL-MCNC: 401 MCG/DL (ref 298–536)
TRANSFERRIN SERPL-MCNC: 269 MG/DL (ref 200–360)
WBC NRBC COR # BLD AUTO: 7.15 10*3/MM3 (ref 3.4–10.8)

## 2025-05-01 PROCEDURE — 83540 ASSAY OF IRON: CPT

## 2025-05-01 PROCEDURE — 82728 ASSAY OF FERRITIN: CPT

## 2025-05-01 PROCEDURE — 85025 COMPLETE CBC W/AUTO DIFF WBC: CPT

## 2025-05-01 PROCEDURE — 84466 ASSAY OF TRANSFERRIN: CPT

## 2025-05-01 PROCEDURE — 36415 COLL VENOUS BLD VENIPUNCTURE: CPT

## 2025-06-02 ENCOUNTER — TELEPHONE (OUTPATIENT)
Dept: ONCOLOGY | Facility: CLINIC | Age: 40
End: 2025-06-02
Payer: COMMERCIAL

## 2025-06-02 NOTE — TELEPHONE ENCOUNTER
Caller: Cristina Haines    Relationship to patient: Self    Best call back number: [phone]    Type of visit: F/U AND PHLEBO    Requested date: 7/7 AFTERNOON APPT TIME    If rescheduling, when is the original appointment: 6/16

## 2025-07-10 RX ORDER — ESCITALOPRAM OXALATE 20 MG/1
20 TABLET ORAL DAILY
Qty: 90 TABLET | Refills: 3 | Status: SHIPPED | OUTPATIENT
Start: 2025-07-10

## 2025-07-10 RX ORDER — ROSUVASTATIN CALCIUM 10 MG/1
10 TABLET, COATED ORAL EVERY EVENING
Qty: 90 TABLET | Refills: 3 | Status: SHIPPED | OUTPATIENT
Start: 2025-07-10

## 2025-07-10 NOTE — TELEPHONE ENCOUNTER
Rx Refill Note  Requested Prescriptions     Pending Prescriptions Disp Refills    escitalopram (LEXAPRO) 20 MG tablet [Pharmacy Med Name: escitalopram 20 mg tablet] 90 tablet 3     Sig: TAKE ONE TABLET BY MOUTH DAILY    rosuvastatin (CRESTOR) 10 MG tablet [Pharmacy Med Name: rosuvastatin 10 mg tablet] 90 tablet 3     Sig: TAKE ONE TABLET BY MOUTH EVERY DAY IN THE EVENING      Last office visit with prescribing clinician: 6/24/2024   Last telemedicine visit with prescribing clinician: Visit date not found   Next office visit with prescribing clinician: 7/23/2025                         Would you like a call back once the refill request has been completed: [] Yes [] No    If the office needs to give you a call back, can they leave a voicemail: [] Yes [] No    Harleen Carcamo MA  07/10/25, 08:22 EDT

## 2025-08-12 DIAGNOSIS — E78.2 MIXED HYPERLIPIDEMIA: ICD-10-CM

## 2025-08-12 DIAGNOSIS — E83.110 HEREDITARY HEMOCHROMATOSIS: Primary | ICD-10-CM

## 2025-08-12 DIAGNOSIS — R73.09 ABNORMAL GLUCOSE: ICD-10-CM

## 2025-08-12 DIAGNOSIS — Z13.29 SCREENING FOR THYROID DISORDER: ICD-10-CM

## 2025-08-12 DIAGNOSIS — E55.9 VITAMIN D DEFICIENCY: ICD-10-CM

## 2025-08-13 DIAGNOSIS — K21.9 GASTROESOPHAGEAL REFLUX DISEASE: ICD-10-CM

## 2025-08-13 RX ORDER — OMEPRAZOLE 20 MG/1
20 CAPSULE, DELAYED RELEASE ORAL DAILY
Qty: 30 CAPSULE | Refills: 5 | Status: SHIPPED | OUTPATIENT
Start: 2025-08-13

## 2025-08-21 ENCOUNTER — LAB (OUTPATIENT)
Dept: LAB | Facility: HOSPITAL | Age: 40
End: 2025-08-21
Payer: COMMERCIAL

## 2025-08-21 ENCOUNTER — OFFICE VISIT (OUTPATIENT)
Dept: ONCOLOGY | Facility: CLINIC | Age: 40
End: 2025-08-21
Payer: COMMERCIAL

## 2025-08-21 ENCOUNTER — HOSPITAL ENCOUNTER (OUTPATIENT)
Facility: HOSPITAL | Age: 40
Discharge: HOME | End: 2025-08-21
Payer: COMMERCIAL

## 2025-08-21 VITALS
WEIGHT: 209 LBS | SYSTOLIC BLOOD PRESSURE: 106 MMHG | OXYGEN SATURATION: 99 % | DIASTOLIC BLOOD PRESSURE: 71 MMHG | HEIGHT: 62 IN | BODY MASS INDEX: 38.46 KG/M2 | TEMPERATURE: 98 F | HEART RATE: 100 BPM | RESPIRATION RATE: 16 BRPM

## 2025-08-21 DIAGNOSIS — E83.110 HEREDITARY HEMOCHROMATOSIS: Primary | ICD-10-CM

## 2025-08-21 PROCEDURE — G0463 HOSPITAL OUTPT CLINIC VISIT: HCPCS

## 2025-08-21 PROCEDURE — 99214 OFFICE O/P EST MOD 30 MIN: CPT | Performed by: NURSE PRACTITIONER
